# Patient Record
Sex: FEMALE | Race: WHITE | Employment: UNEMPLOYED | URBAN - METROPOLITAN AREA
[De-identification: names, ages, dates, MRNs, and addresses within clinical notes are randomized per-mention and may not be internally consistent; named-entity substitution may affect disease eponyms.]

---

## 2017-02-07 ENCOUNTER — HOSPITAL ENCOUNTER (INPATIENT)
Age: 47
LOS: 4 days | Discharge: HOME | DRG: 353 | End: 2017-02-13
Attending: EMERGENCY MEDICINE | Admitting: SURGERY
Payer: MEDICARE

## 2017-02-07 ENCOUNTER — APPOINTMENT (OUTPATIENT)
Dept: CT IMAGING | Age: 47
DRG: 353 | End: 2017-02-07
Attending: EMERGENCY MEDICINE
Payer: MEDICARE

## 2017-02-07 DIAGNOSIS — K56.609 SMALL BOWEL OBSTRUCTION (HCC): Primary | ICD-10-CM

## 2017-02-07 DIAGNOSIS — F11.20 NARCOTIC DEPENDENCE (HCC): Chronic | ICD-10-CM

## 2017-02-07 DIAGNOSIS — J96.01 ACUTE RESPIRATORY FAILURE WITH HYPOXIA (HCC): ICD-10-CM

## 2017-02-07 DIAGNOSIS — K43.0 INCARCERATED INCISIONAL HERNIA: ICD-10-CM

## 2017-02-07 DIAGNOSIS — K42.0 UMBILICAL HERNIA WITH OBSTRUCTION, WITHOUT GANGRENE: ICD-10-CM

## 2017-02-07 LAB
ALBUMIN SERPL BCP-MCNC: 3.3 G/DL (ref 3.5–5)
ALBUMIN/GLOB SERPL: 0.9 {RATIO} (ref 1.2–3.5)
ALP SERPL-CCNC: 101 U/L (ref 50–136)
ALT SERPL-CCNC: 17 U/L (ref 12–65)
ANION GAP BLD CALC-SCNC: 11 MMOL/L (ref 7–16)
AST SERPL W P-5'-P-CCNC: 13 U/L (ref 15–37)
BACTERIA URNS QL MICRO: ABNORMAL /HPF
BASOPHILS # BLD AUTO: 0 K/UL (ref 0–0.2)
BASOPHILS # BLD: 0 % (ref 0–2)
BILIRUB SERPL-MCNC: 0.1 MG/DL (ref 0.2–1.1)
BUN SERPL-MCNC: 16 MG/DL (ref 6–23)
CALCIUM SERPL-MCNC: 7.8 MG/DL (ref 8.3–10.4)
CASTS URNS QL MICRO: ABNORMAL /LPF
CHLORIDE SERPL-SCNC: 107 MMOL/L (ref 98–107)
CO2 SERPL-SCNC: 24 MMOL/L (ref 21–32)
CREAT SERPL-MCNC: 0.74 MG/DL (ref 0.6–1)
CRYSTALS URNS QL MICRO: 0 /LPF
DIFFERENTIAL METHOD BLD: ABNORMAL
EOSINOPHIL # BLD: 0.1 K/UL (ref 0–0.8)
EOSINOPHIL NFR BLD: 1 % (ref 0.5–7.8)
EPI CELLS #/AREA URNS HPF: ABNORMAL /HPF
ERYTHROCYTE [DISTWIDTH] IN BLOOD BY AUTOMATED COUNT: 15 % (ref 11.9–14.6)
GLOBULIN SER CALC-MCNC: 3.5 G/DL (ref 2.3–3.5)
GLUCOSE SERPL-MCNC: 121 MG/DL (ref 65–100)
HCG UR QL: NEGATIVE
HCT VFR BLD AUTO: 43.8 % (ref 35.8–46.3)
HGB BLD-MCNC: 14.5 G/DL (ref 11.7–15.4)
IMM GRANULOCYTES # BLD: 0 K/UL (ref 0–0.5)
IMM GRANULOCYTES NFR BLD AUTO: 0.3 % (ref 0–5)
LYMPHOCYTES # BLD AUTO: 14 % (ref 13–44)
LYMPHOCYTES # BLD: 2 K/UL (ref 0.5–4.6)
MCH RBC QN AUTO: 31.3 PG (ref 26.1–32.9)
MCHC RBC AUTO-ENTMCNC: 33.1 G/DL (ref 31.4–35)
MCV RBC AUTO: 94.4 FL (ref 79.6–97.8)
MONOCYTES # BLD: 0.7 K/UL (ref 0.1–1.3)
MONOCYTES NFR BLD AUTO: 5 % (ref 4–12)
MUCOUS THREADS URNS QL MICRO: ABNORMAL /LPF
NEUTS SEG # BLD: 11.3 K/UL (ref 1.7–8.2)
NEUTS SEG NFR BLD AUTO: 80 % (ref 43–78)
PLATELET # BLD AUTO: 286 K/UL (ref 150–450)
PMV BLD AUTO: 10 FL (ref 10.8–14.1)
POTASSIUM SERPL-SCNC: 3.4 MMOL/L (ref 3.5–5.1)
PROT SERPL-MCNC: 6.8 G/DL (ref 6.3–8.2)
RBC # BLD AUTO: 4.64 M/UL (ref 4.05–5.25)
RBC #/AREA URNS HPF: ABNORMAL /HPF
SODIUM SERPL-SCNC: 142 MMOL/L (ref 136–145)
WBC # BLD AUTO: 14.2 K/UL (ref 4.3–11.1)
WBC URNS QL MICRO: ABNORMAL /HPF

## 2017-02-07 PROCEDURE — 81025 URINE PREGNANCY TEST: CPT

## 2017-02-07 PROCEDURE — 81015 MICROSCOPIC EXAM OF URINE: CPT | Performed by: EMERGENCY MEDICINE

## 2017-02-07 PROCEDURE — 74011250636 HC RX REV CODE- 250/636: Performed by: EMERGENCY MEDICINE

## 2017-02-07 PROCEDURE — 96374 THER/PROPH/DIAG INJ IV PUSH: CPT | Performed by: EMERGENCY MEDICINE

## 2017-02-07 PROCEDURE — 85025 COMPLETE CBC W/AUTO DIFF WBC: CPT | Performed by: EMERGENCY MEDICINE

## 2017-02-07 PROCEDURE — 96361 HYDRATE IV INFUSION ADD-ON: CPT | Performed by: EMERGENCY MEDICINE

## 2017-02-07 PROCEDURE — 80053 COMPREHEN METABOLIC PANEL: CPT | Performed by: EMERGENCY MEDICINE

## 2017-02-07 PROCEDURE — 0JX80ZC TRANSFER ABDOMEN SUBCUTANEOUS TISSUE AND FASCIA WITH SKIN, SUBCUTANEOUS TISSUE AND FASCIA, OPEN APPROACH: ICD-10-PCS | Performed by: SURGERY

## 2017-02-07 PROCEDURE — 74176 CT ABD & PELVIS W/O CONTRAST: CPT

## 2017-02-07 PROCEDURE — 99218 HC RM OBSERVATION: CPT

## 2017-02-07 PROCEDURE — 81003 URINALYSIS AUTO W/O SCOPE: CPT | Performed by: EMERGENCY MEDICINE

## 2017-02-07 PROCEDURE — 96375 TX/PRO/DX INJ NEW DRUG ADDON: CPT | Performed by: EMERGENCY MEDICINE

## 2017-02-07 PROCEDURE — 99285 EMERGENCY DEPT VISIT HI MDM: CPT | Performed by: EMERGENCY MEDICINE

## 2017-02-07 PROCEDURE — 0WUF0JZ SUPPLEMENT ABDOMINAL WALL WITH SYNTHETIC SUBSTITUTE, OPEN APPROACH: ICD-10-PCS | Performed by: SURGERY

## 2017-02-07 RX ORDER — DIPHENHYDRAMINE HYDROCHLORIDE 50 MG/ML
12.5 INJECTION, SOLUTION INTRAMUSCULAR; INTRAVENOUS
Status: DISCONTINUED | OUTPATIENT
Start: 2017-02-07 | End: 2017-02-09

## 2017-02-07 RX ORDER — MORPHINE SULFATE 4 MG/ML
4 INJECTION, SOLUTION INTRAMUSCULAR; INTRAVENOUS
Status: COMPLETED | OUTPATIENT
Start: 2017-02-07 | End: 2017-02-07

## 2017-02-07 RX ORDER — HYDROMORPHONE HYDROCHLORIDE 1 MG/ML
.5-2 INJECTION, SOLUTION INTRAMUSCULAR; INTRAVENOUS; SUBCUTANEOUS
Status: DISCONTINUED | OUTPATIENT
Start: 2017-02-07 | End: 2017-02-07 | Stop reason: SDUPTHER

## 2017-02-07 RX ORDER — HYDROMORPHONE HYDROCHLORIDE 1 MG/ML
0.5 INJECTION, SOLUTION INTRAMUSCULAR; INTRAVENOUS; SUBCUTANEOUS
Status: DISCONTINUED | OUTPATIENT
Start: 2017-02-07 | End: 2017-02-09

## 2017-02-07 RX ORDER — ONDANSETRON 2 MG/ML
4 INJECTION INTRAMUSCULAR; INTRAVENOUS
Status: DISCONTINUED | OUTPATIENT
Start: 2017-02-07 | End: 2017-02-13 | Stop reason: HOSPADM

## 2017-02-07 RX ORDER — SODIUM CHLORIDE 0.9 % (FLUSH) 0.9 %
5-10 SYRINGE (ML) INJECTION AS NEEDED
Status: DISCONTINUED | OUTPATIENT
Start: 2017-02-07 | End: 2017-02-13 | Stop reason: HOSPADM

## 2017-02-07 RX ORDER — DEXTROSE, SODIUM CHLORIDE, SODIUM LACTATE, POTASSIUM CHLORIDE, AND CALCIUM CHLORIDE 5; .6; .31; .03; .02 G/100ML; G/100ML; G/100ML; G/100ML; G/100ML
125 INJECTION, SOLUTION INTRAVENOUS CONTINUOUS
Status: DISCONTINUED | OUTPATIENT
Start: 2017-02-07 | End: 2017-02-09

## 2017-02-07 RX ORDER — PHENYTOIN SODIUM 100 MG/1
300 CAPSULE, EXTENDED RELEASE ORAL 2 TIMES DAILY
Status: DISCONTINUED | OUTPATIENT
Start: 2017-02-08 | End: 2017-02-13 | Stop reason: HOSPADM

## 2017-02-07 RX ORDER — CLONAZEPAM 0.5 MG/1
1 TABLET ORAL EVERY 4 HOURS
Status: DISCONTINUED | OUTPATIENT
Start: 2017-02-08 | End: 2017-02-10

## 2017-02-07 RX ORDER — ENOXAPARIN SODIUM 100 MG/ML
40 INJECTION SUBCUTANEOUS EVERY 24 HOURS
Status: DISCONTINUED | OUTPATIENT
Start: 2017-02-07 | End: 2017-02-07 | Stop reason: SDUPTHER

## 2017-02-07 RX ORDER — ENOXAPARIN SODIUM 100 MG/ML
40 INJECTION SUBCUTANEOUS EVERY 24 HOURS
Status: DISCONTINUED | OUTPATIENT
Start: 2017-02-08 | End: 2017-02-13 | Stop reason: HOSPADM

## 2017-02-07 RX ORDER — HYDROMORPHONE HYDROCHLORIDE 1 MG/ML
1 INJECTION, SOLUTION INTRAMUSCULAR; INTRAVENOUS; SUBCUTANEOUS
Status: DISCONTINUED | OUTPATIENT
Start: 2017-02-07 | End: 2017-02-09

## 2017-02-07 RX ORDER — ONDANSETRON 2 MG/ML
4 INJECTION INTRAMUSCULAR; INTRAVENOUS
Status: COMPLETED | OUTPATIENT
Start: 2017-02-07 | End: 2017-02-07

## 2017-02-07 RX ORDER — TRAZODONE HYDROCHLORIDE 50 MG/1
100 TABLET ORAL
Status: DISCONTINUED | OUTPATIENT
Start: 2017-02-07 | End: 2017-02-13 | Stop reason: HOSPADM

## 2017-02-07 RX ORDER — SODIUM CHLORIDE 0.9 % (FLUSH) 0.9 %
5-10 SYRINGE (ML) INJECTION EVERY 8 HOURS
Status: DISCONTINUED | OUTPATIENT
Start: 2017-02-07 | End: 2017-02-13 | Stop reason: HOSPADM

## 2017-02-07 RX ORDER — SODIUM CHLORIDE 0.9 % (FLUSH) 0.9 %
5-10 SYRINGE (ML) INJECTION AS NEEDED
Status: DISCONTINUED | OUTPATIENT
Start: 2017-02-07 | End: 2017-02-07

## 2017-02-07 RX ORDER — SODIUM CHLORIDE 0.9 % (FLUSH) 0.9 %
5-10 SYRINGE (ML) INJECTION EVERY 8 HOURS
Status: DISCONTINUED | OUTPATIENT
Start: 2017-02-07 | End: 2017-02-07

## 2017-02-07 RX ORDER — AMLODIPINE BESYLATE 5 MG/1
5 TABLET ORAL DAILY
Status: DISCONTINUED | OUTPATIENT
Start: 2017-02-08 | End: 2017-02-13 | Stop reason: HOSPADM

## 2017-02-07 RX ORDER — HYDROMORPHONE HYDROCHLORIDE 1 MG/ML
2 INJECTION, SOLUTION INTRAMUSCULAR; INTRAVENOUS; SUBCUTANEOUS
Status: DISCONTINUED | OUTPATIENT
Start: 2017-02-07 | End: 2017-02-13

## 2017-02-07 RX ADMIN — SODIUM CHLORIDE 1000 ML: 900 INJECTION, SOLUTION INTRAVENOUS at 21:19

## 2017-02-07 RX ADMIN — ONDANSETRON 4 MG: 2 INJECTION INTRAMUSCULAR; INTRAVENOUS at 21:19

## 2017-02-07 RX ADMIN — MORPHINE SULFATE 4 MG: 4 INJECTION, SOLUTION INTRAMUSCULAR; INTRAVENOUS at 21:19

## 2017-02-07 NOTE — IP AVS SNAPSHOT
[address] 
[phone] Patient: Lydia Nava MRN: [id number] :[id number] You are allergic to the following Allergen Reactions Contrast Dye (Iodine) Anaphylaxis Pcn (Penicillins) Anaphylaxis Other Medication Other (comments) Contrast dye unknown name \"throat closes up\" Pcn (Penicillins) Itching Recent Documentation Height Weight Breastfeeding? BMI OB Status Smoking Status 1.727 m 90.7 kg No 30.41 kg/m2 Having regular periods Current Every Day Smoker Emergency Contacts Name Discharge Info Relation Home Work Mobile Ladonna Ng [17] [phone] About your hospitalization You were admitted on:  2017 [facility] on:  2017 Unit phone number:  [phone] Why you were hospitalized Your primary diagnosis was: Incarcerated Incisional Hernia Your diagnoses also included:  Depression, Seizure Disorder (Hcc), Copd (Chronic Obstructive Pulmonary Disease) (Hcc), Pain, Chronic, Bipolar Disorder (Hcc), Acute Respiratory Failure With Hypoxia (Hcc), Tobacco Use Disorder, Cap (Community Acquired Pneumonia) Providers Seen During Your Hospitalizations Provider Role Specialty Primary office phone Miley Tejada MD Attending Provider Emergency Medicine [phone] Willie Marino MD Attending Provider General Surgery [phone] Your Primary Care Physician (PCP) Primary Care Physician Office Phone Office Fax NOT ON FILE ** None ** ** None ** Follow-up Information Follow up With Details Comments Contact Info Not On File Bshsi In 1 week for Tobacco cessation meds/follow-up Not On File (58) Patient has a PCP but that physician is not listed in [facility]. Jacey Manjarrez MD On 2/21/2017 0845 am  Critical access hospital [address] [facility] [address] 
[phone] Your Appointments Tuesday February 21, 2017  8:45 AM EST Global Post Op with Jacey Manjarrez MD  
[place] [facility] ([address] [place] [address]) [address] [place] [id number] [phone] Current Discharge Medication List  
  
START taking these medications Dose & Instructions Dispensing Information Comments Morning Noon Evening Bedtime  
 albuterol-ipratropium 2.5 mg-0.5 mg/3 ml Nebu Commonly known as:  Micaela Stubbs Your next dose is: Today, Tomorrow Other:  _________ Dose:  3 mL  
3 mL by Nebulization route every four (4) hours as needed. Quantity:  180 Nebule Refills:  1  
     
   
   
   
  
 levoFLOXacin 750 mg tablet Commonly known as:  Threasa Mince Your next dose is: Today, Tomorrow Other:  _________ Dose:  750 mg Take 1 Tab by mouth daily for 3 days. Quantity:  3 Tab Refills:  0  
     
   
   
   
  
 nicotine 21 mg/24 hr  
Commonly known as:  Bernabe Monson Your next dose is: Today, Tomorrow Other:  _________ Dose:  1 Patch 1 Patch by TransDERmal route daily for 7 days. Quantity:  7 Patch Refills:  0  
     
   
   
   
  
 oxyCODONE-acetaminophen  mg per tablet Commonly known as:  PERCOCET 10 Your next dose is: Today, Tomorrow Other:  _________ Dose:  1 Tab Take 1 Tab by mouth every four (4) hours as needed. Max Daily Amount: 6 Tabs. Quantity:  30 Tab Refills:  0 CONTINUE these medications which have NOT CHANGED Dose & Instructions Dispensing Information Comments Morning Noon Evening Bedtime  
 amLODIPine 5 mg tablet Commonly known as:  Dewayne Nairer Your next dose is: Today, Tomorrow Other:  _________ Dose:  5 mg Take 5 mg by mouth daily. Refills:  0  
     
   
   
   
  
 baclofen 10 mg tablet Commonly known as:  LIORESAL Your next dose is: Today, Tomorrow Other:  _________ Dose:  10 mg Take 10 mg by mouth three (3) times daily. Refills:  0  
     
   
   
   
  
 carvedilol 12.5 mg tablet Commonly known as:  Phineas Hoar Your next dose is: Today, Tomorrow Other:  _________ Dose:  12.5 mg Take 12.5 mg by mouth daily. Refills:  0  
     
   
   
   
  
 DILANTIN EXTENDED 100 mg ER capsule Generic drug:  phenytoin ER Your next dose is: Today, Tomorrow Other:  _________ Dose:  300 mg Take 300 mg by mouth two (2) times a day. Refills:  0  
     
   
   
   
  
 divalproex  mg ER tablet Commonly known as:  DEPAKOTE ER Your next dose is: Today, Tomorrow Other:  _________ Dose:  250 mg Take 250 mg by mouth nightly. Refills:  0  
     
   
   
   
  
 escitalopram oxalate 10 mg tablet Commonly known as:  Skip  Your next dose is: Today, Tomorrow Other:  _________ Dose:  10 mg Take 10 mg by mouth daily. Refills:  0 KlonoPIN 1 mg tablet Generic drug:  clonazePAM  
   
Your next dose is: Today, Tomorrow Other:  _________ Take  by mouth every four (4) hours. Refills:  0  
     
   
   
   
  
 levothyroxine 50 mcg tablet Commonly known as:  SYNTHROID Your next dose is: Today, Tomorrow Other:  _________ Dose:  50 mcg Take 50 mcg by mouth Daily (before breakfast). Refills:  0  
     
   
   
   
  
 traZODone 100 mg tablet Commonly known as:  Alok Bump Your next dose is: Today, Tomorrow Other:  _________ Dose:  100 mg Take 100 mg by mouth nightly. Refills:  0  
     
   
   
   
  
 zonisamide 100 mg capsule Commonly known as:  Viviana Patel  
   
 Your next dose is: Today, Tomorrow Other:  _________ Dose:  100 mg Take 100 mg by mouth daily. Refills:  0 STOP taking these medications HYDROcodone-acetaminophen 5-325 mg per tablet Commonly known as:  Ren Byrne Where to Get Your Medications Information on where to get these meds will be given to you by the nurse or doctor. ! Ask your nurse or doctor about these medications  
  albuterol-ipratropium 2.5 mg-0.5 mg/3 ml Nebu  
 levoFLOXacin 750 mg tablet  
 nicotine 21 mg/24 hr  
 oxyCODONE-acetaminophen  mg per tablet Discharge Instructions MD Instructions: Follow-up with Dr. Saintclair Collard in 1 week. Keep incision/staples clean and dry, keep covered to prevent binder from rubbing. Do not apply lotions, creams or ointments to incisions/yonny. Mirian Gambler will be removed at follow-up appointment. Must wear abdominal binder at all times. AMINA management: Empty drain at least daily or if full. Record amount. Bring this information to your appointment. Diet - as tolerated - GI soft diet. Activity - ambulate - as tolerated - no heavy lifting >10lb. May shower - no tub baths or soaking. No driving while taking narcotics. Do not drink alcohol while taking narcotics. Resume other home medications. Complete antibiotics as directed. If problems or questions arise, please call our office at [phone]. DISCHARGE SUMMARY from Nurse The following personal items are in your possession at time of discharge: 
 
Dental Appliances: None Visual Aid: None Home Medications: None Jewelry: None Clothing: None Other Valuables: Cell Phone Personal Items Sent to Safe: none PATIENT INSTRUCTIONS: 
 
After general anesthesia or intravenous sedation, for 24 hours or while taking prescription Narcotics: · Limit your activities · Do not drive and operate hazardous machinery · Do not make important personal or business decisions · Do  not drink alcoholic beverages · If you have not urinated within 8 hours after discharge, please contact your surgeon on call. Report the following to your surgeon: 
· Excessive pain, swelling, redness or odor of or around the surgical area · Temperature over 100.5 · Nausea and vomiting lasting longer than 4 hours or if unable to take medications · Any signs of decreased circulation or nerve impairment to extremity: change in color, persistent  numbness, tingling, coldness or increase pain · Any questions What to do at Home: 
Recommended activity: Activity as tolerated, per MD instructions, No heavy lifting If you experience any of the following symptoms fever > 101, nausea, vomiting, abdominal pain, chest pain, shortness of breath please follow up with MD. 
 
 
*  Please give a list of your current medications to your Primary Care Provider. *  Please update this list whenever your medications are discontinued, doses are 
    changed, or new medications (including over-the-counter products) are added. *  Please carry medication information at all times in case of emergency situations. These are general instructions for a healthy lifestyle: No smoking/ No tobacco products/ Avoid exposure to second hand smoke Surgeon General's Warning:  Quitting smoking now greatly reduces serious risk to your health. Obesity, smoking, and sedentary lifestyle greatly increases your risk for illness A healthy diet, regular physical exercise & weight monitoring are important for maintaining a healthy lifestyle You may be retaining fluid if you have a history of heart failure or if you experience any of the following symptoms:  Weight gain of 3 pounds or more overnight or 5 pounds in a week, increased swelling in our hands or feet or shortness of breath while lying flat in bed. Please call your doctor as soon as you notice any of these symptoms; do not wait until your next office visit. Recognize signs and symptoms of STROKE: 
 
F-face looks uneven A-arms unable to move or move unevenly S-speech slurred or non-existent T-time-call 911 as soon as signs and symptoms begin-DO NOT go Back to bed or wait to see if you get better-TIME IS BRAIN. Warning Signs of HEART ATTACK Call 911 if you have these symptoms: 
? Chest discomfort. Most heart attacks involve discomfort in the center of the chest that lasts more than a few minutes, or that goes away and comes back. It can feel like uncomfortable pressure, squeezing, fullness, or pain. ? Discomfort in other areas of the upper body. Symptoms can include pain or discomfort in one or both arms, the back, neck, jaw, or stomach. ? Shortness of breath with or without chest discomfort. ? Other signs may include breaking out in a cold sweat, nausea, or lightheadedness. Don't wait more than five minutes to call [address]! Fast action can save your life. Calling 911 is almost always the fastest way to get lifesaving treatment. Emergency Medical Services staff can begin treatment when they arrive  up to an hour sooner than if someone gets to the hospital by car. The discharge information has been reviewed with the patient. The patient verbalized understanding. Discharge medications reviewed with the patient and appropriate educational materials and side effects teaching were provided. Hernia Repair: What to Expect at Home Your Recovery You are likely to have pain for the next few days. You may also feel like you have the flu, and you may have a low fever and feel tired and nauseated. This is common. You should feel better after a few days and will probably feel much better in 7 days. For several weeks you may feel twinges or pulling in the hernia repair when you move. You may have some bruising on the scrotum and along the penis. This is normal. Men will need to wear a jockstrap or briefs, not boxers, for scrotal support for several days after a groin (inguinal) hernia repair. [employer] bicycle shorts may provide good support. This care sheet gives you a general idea about how long it will take for you to recover. But each person recovers at a different pace. Follow the steps below to get better as quickly as possible. How can you care for yourself at home? Activity · Rest when you feel tired. Getting enough sleep will help you recover. · Try to walk each day. Start by walking a little more than you did the day before. Bit by bit, increase the amount you walk. Walking boosts blood flow and helps prevent pneumonia and constipation. · Avoid strenuous activities, such as biking, jogging, weight lifting, or aerobic exercise, until your doctor says it is okay. · Avoid lifting anything that would make you strain. This may include heavy grocery bags and milk containers, a heavy briefcase or backpack, cat litter or dog food bags, a vacuum , or a child. · You may drive when you are no longer taking pain medicine and can quickly move your foot from the gas pedal to the brake. You must also be able to sit comfortably for a long period of time, even if you do not plan to go far. You might get caught in traffic. · Most people are able to return to work within 1 to 2 weeks after surgery. · You may shower 24 to 48 hours after surgery, if your doctor okays it. Pat the cut (incision) dry. Do not take a bath for the first 2 weeks, or until your doctor tells you it is okay. · Your doctor will tell you when you can have sex again. Diet · You can eat your normal diet. If your stomach is upset, try bland, low-fat foods like plain rice, broiled chicken, toast, and yogurt. · Drink plenty of fluids (unless your doctor tells you not to). · You may notice that your bowel movements are not regular right after your surgery. This is common. Avoid constipation and straining with bowel movements. You may want to take a fiber supplement every day. If you have not had a bowel movement after a couple of days, ask your doctor about taking a mild laxative. Medicines · Your doctor will tell you if and when you can restart your medicines. He or she will also give you instructions about taking any new medicines. · If you take blood thinners, such as warfarin (Coumadin), clopidogrel (Plavix), or aspirin, be sure to talk to your doctor. He or she will tell you if and when to start taking those medicines again. Make sure that you understand exactly what your doctor wants you to do. · Be safe with medicines. Take pain medicines exactly as directed. ¨ If the doctor gave you a prescription medicine for pain, take it as prescribed. ¨ If you are not taking a prescription pain medicine, take an over-the-counter medicine such as acetaminophen (Tylenol), ibuprofen (Advil, Motrin), or naproxen (Aleve). Read and follow all instructions on the label. ¨ Do not take two or more pain medicines at the same time unless the doctor told you to. Many pain medicines have acetaminophen, which is Tylenol. Too much acetaminophen (Tylenol) can be harmful. · If your doctor prescribed antibiotics, take them as directed. Do not stop taking them just because you feel better. You need to take the full course of antibiotics. · If you think your pain medicine is making you sick to your stomach: 
¨ Take your medicine after meals (unless your doctor has told you not to). ¨ Ask your doctor for a different pain medicine. Incision care · If you have strips of tape on the cut (incision) the doctor made, leave the tape on for a week or until it falls off. · If you have staples closing the cut, you will need to visit your doctor in 1 to 2 weeks to have them removed. · Wash the area daily with warm, soapy water and pat it dry. Follow-up care is a key part of your treatment and safety. Be sure to make and go to all appointments, and call your doctor if you are having problems. It's also a good idea to know your test results and keep a list of the medicines you take. When should you call for help? Call 911 anytime you think you may need emergency care. For example, call if: 
· You passed out (lost consciousness). · You have sudden chest pain and shortness of breath, or you cough up blood. · You have severe pain in your belly. Call your doctor now or seek immediate medical care if: 
· You are sick to your stomach and cannot keep fluids down. · You have signs of a blood clot, such as: 
¨ Pain in your calf, back of the knee, thigh, or groin. ¨ Redness and swelling in your leg or groin. · You have trouble passing urine or stool, especially if you have mild pain or swelling in your lower belly. · Bright red blood has soaked through the bandage over your incision. Watch closely for any changes in your health, and be sure to contact your doctor if: 
· Your swelling is getting worse. · Your swelling is not going down. Where can you learn more? Go to http://ang-claribel.info/. Enter V882 in the search box to learn more about \"Hernia Repair: What to Expect at Home. \" Current as of: August 9, 2016 Content Version: 11.1 © 6980-4177 [employer], [employer]. Care instructions adapted under license by [employer] (which disclaims liability or warranty for this information). If you have questions about a medical condition or this instruction, always ask your healthcare professional. [address] any warranty or liability for your use of this information. Discharge Orders None [employer] Announcement We are excited to announce that we are making your provider's discharge notes available to you in [employer]. You will see these notes when they are completed and signed by the physician that discharged you from your recent hospital stay. If you have any questions or concerns about any information you see in [employer], please call the Health Information Department where you were seen or reach out to your Primary Care Provider for more information about your plan of care. Introducing \[facility]\"" & HEALTH SERVICES! [address] introduces [employer] patient portal. Now you can access parts of your medical record, email your doctor's office, and request medication refills online. 1. In your internet browser, go to [email or web address] 2. Click on the First Time User? Click Here link in the Sign In box. You will see the New Member Sign Up page. 3. Enter your [employer] Access Code exactly as it appears below. You will not need to use this code after youve completed the sign-up process. If you do not sign up before the expiration date, you must request a new code. · [employer] Access Code: [email or web address] Expires: 2/24/2017  2:02 PM 
 
4. Enter the last four digits of your Social Security Number (xxxx) and Date of Birth (mm/dd/yyyy) as indicated and click Submit. You will be taken to the next sign-up page. 5. Create a [employer] ID. This will be your [employer] login ID and cannot be changed, so think of one that is secure and easy to remember. 6. Create a [employer] password. You can change your password at any time. 7. Enter your Password Reset Question and Answer. This can be used at a later time if you forget your password. 8. Enter your e-mail address. You will receive e-mail notification when new information is available in [address] 9. Click Sign Up. You can now view and download portions of your medical record. 10. Click the Download Summary menu link to download a portable copy of your medical information. If you have questions, please visit the Frequently Asked Questions section of the [employer] website. Remember, [employer] is NOT to be used for urgent needs. For medical emergencies, dial 911. Now available from your iPhone and Android! General Information Please provide this summary of care documentation to your next provider. Patient Signature:  ____________________________________________________________ Date:  ____________________________________________________________  
  
Jayde Sleight Provider Signature:  ____________________________________________________________ Date:  ____________________________________________________________

## 2017-02-08 ENCOUNTER — ANESTHESIA (OUTPATIENT)
Dept: SURGERY | Age: 47
DRG: 353 | End: 2017-02-08
Payer: MEDICARE

## 2017-02-08 ENCOUNTER — ANESTHESIA EVENT (OUTPATIENT)
Dept: SURGERY | Age: 47
DRG: 353 | End: 2017-02-08
Payer: MEDICARE

## 2017-02-08 ENCOUNTER — SURGERY (OUTPATIENT)
Age: 47
End: 2017-02-08

## 2017-02-08 PROBLEM — K43.0 INCARCERATED INCISIONAL HERNIA: Status: ACTIVE | Noted: 2017-02-08

## 2017-02-08 PROCEDURE — 94640 AIRWAY INHALATION TREATMENT: CPT

## 2017-02-08 PROCEDURE — 74011000258 HC RX REV CODE- 258: Performed by: SURGERY

## 2017-02-08 PROCEDURE — 74011250636 HC RX REV CODE- 250/636: Performed by: ANESTHESIOLOGY

## 2017-02-08 PROCEDURE — 74011250637 HC RX REV CODE- 250/637: Performed by: ANESTHESIOLOGY

## 2017-02-08 PROCEDURE — 74011000250 HC RX REV CODE- 250

## 2017-02-08 PROCEDURE — 99218 HC RM OBSERVATION: CPT

## 2017-02-08 PROCEDURE — 74011000250 HC RX REV CODE- 250: Performed by: SURGERY

## 2017-02-08 PROCEDURE — 74011258636 HC RX REV CODE- 258/636: Performed by: SURGERY

## 2017-02-08 PROCEDURE — 77030008477 HC STYL SATN SLP COVD -A: Performed by: ANESTHESIOLOGY

## 2017-02-08 PROCEDURE — 74011250636 HC RX REV CODE- 250/636

## 2017-02-08 PROCEDURE — 77030031139 HC SUT VCRL2 J&J -A: Performed by: SURGERY

## 2017-02-08 PROCEDURE — 74011000250 HC RX REV CODE- 250: Performed by: ANESTHESIOLOGY

## 2017-02-08 PROCEDURE — 77030020782 HC GWN BAIR PAWS FLX 3M -B: Performed by: ANESTHESIOLOGY

## 2017-02-08 PROCEDURE — 77030032490 HC SLV COMPR SCD KNE COVD -B: Performed by: SURGERY

## 2017-02-08 PROCEDURE — 74011250636 HC RX REV CODE- 250/636: Performed by: SURGERY

## 2017-02-08 PROCEDURE — 77030012407 HC DRN WND BARD -B: Performed by: SURGERY

## 2017-02-08 PROCEDURE — 77030008467 HC STPLR SKN COVD -B: Performed by: SURGERY

## 2017-02-08 PROCEDURE — 77030002986 HC SUT PROL J&J -A: Performed by: SURGERY

## 2017-02-08 PROCEDURE — 77030005326 HC CATH PAIN PMP/Q AVNM -C: Performed by: SURGERY

## 2017-02-08 PROCEDURE — 77030002916 HC SUT ETHLN J&J -A: Performed by: SURGERY

## 2017-02-08 PROCEDURE — 77030019908 HC STETH ESOPH SIMS -A: Performed by: ANESTHESIOLOGY

## 2017-02-08 PROCEDURE — 77030018836 HC SOL IRR NACL ICUM -A: Performed by: SURGERY

## 2017-02-08 PROCEDURE — 77030011640 HC PAD GRND REM COVD -A: Performed by: SURGERY

## 2017-02-08 PROCEDURE — 74011250637 HC RX REV CODE- 250/637

## 2017-02-08 PROCEDURE — 77030008703 HC TU ET UNCUF COVD -A: Performed by: ANESTHESIOLOGY

## 2017-02-08 PROCEDURE — 74011250637 HC RX REV CODE- 250/637: Performed by: SURGERY

## 2017-02-08 PROCEDURE — 76060000036 HC ANESTHESIA 2.5 TO 3 HR: Performed by: SURGERY

## 2017-02-08 PROCEDURE — 76010000171 HC OR TIME 2 TO 2.5 HR INTENSV-TIER 1: Performed by: SURGERY

## 2017-02-08 PROCEDURE — C1781 MESH (IMPLANTABLE): HCPCS | Performed by: SURGERY

## 2017-02-08 PROCEDURE — 77030013567 HC DRN WND RESERV BARD -A: Performed by: SURGERY

## 2017-02-08 PROCEDURE — 76210000000 HC OR PH I REC 2 TO 2.5 HR: Performed by: SURGERY

## 2017-02-08 DEVICE — MESH HERN W3XL6IN INGUINAL POLYPR MFIL RECTANG: Type: IMPLANTABLE DEVICE | Site: ABDOMEN | Status: FUNCTIONAL

## 2017-02-08 RX ORDER — TRAZODONE HYDROCHLORIDE 100 MG/1
100 TABLET ORAL
COMMUNITY

## 2017-02-08 RX ORDER — BUPIVACAINE HYDROCHLORIDE 2.5 MG/ML
INJECTION, SOLUTION EPIDURAL; INFILTRATION; INTRACAUDAL AS NEEDED
Status: DISCONTINUED | OUTPATIENT
Start: 2017-02-08 | End: 2017-02-08 | Stop reason: HOSPADM

## 2017-02-08 RX ORDER — LEVOTHYROXINE SODIUM 50 UG/1
50 TABLET ORAL
Status: DISCONTINUED | OUTPATIENT
Start: 2017-02-09 | End: 2017-02-13 | Stop reason: HOSPADM

## 2017-02-08 RX ORDER — ZONISAMIDE 100 MG/1
100 CAPSULE ORAL DAILY
Status: DISCONTINUED | OUTPATIENT
Start: 2017-02-09 | End: 2017-02-13 | Stop reason: HOSPADM

## 2017-02-08 RX ORDER — CARVEDILOL 12.5 MG/1
12.5 TABLET ORAL 2 TIMES DAILY
Status: DISCONTINUED | OUTPATIENT
Start: 2017-02-09 | End: 2017-02-13 | Stop reason: HOSPADM

## 2017-02-08 RX ORDER — ESCITALOPRAM OXALATE 10 MG/1
10 TABLET ORAL DAILY
Status: DISCONTINUED | OUTPATIENT
Start: 2017-02-09 | End: 2017-02-13 | Stop reason: HOSPADM

## 2017-02-08 RX ORDER — DIVALPROEX SODIUM 250 MG/1
250 TABLET, EXTENDED RELEASE ORAL 2 TIMES DAILY
COMMUNITY

## 2017-02-08 RX ORDER — IBUPROFEN 200 MG
1 TABLET ORAL DAILY
Status: DISCONTINUED | OUTPATIENT
Start: 2017-02-08 | End: 2017-02-13 | Stop reason: HOSPADM

## 2017-02-08 RX ORDER — SUCCINYLCHOLINE CHLORIDE 20 MG/ML
INJECTION INTRAMUSCULAR; INTRAVENOUS AS NEEDED
Status: DISCONTINUED | OUTPATIENT
Start: 2017-02-08 | End: 2017-02-08 | Stop reason: HOSPADM

## 2017-02-08 RX ORDER — TRAZODONE HYDROCHLORIDE 50 MG/1
100 TABLET ORAL
Status: DISCONTINUED | OUTPATIENT
Start: 2017-02-08 | End: 2017-02-08 | Stop reason: SDUPTHER

## 2017-02-08 RX ORDER — OXYCODONE HYDROCHLORIDE 5 MG/1
5 TABLET ORAL
Status: DISCONTINUED | OUTPATIENT
Start: 2017-02-08 | End: 2017-02-08 | Stop reason: HOSPADM

## 2017-02-08 RX ORDER — ROCURONIUM BROMIDE 10 MG/ML
INJECTION, SOLUTION INTRAVENOUS AS NEEDED
Status: DISCONTINUED | OUTPATIENT
Start: 2017-02-08 | End: 2017-02-08 | Stop reason: HOSPADM

## 2017-02-08 RX ORDER — GLYCOPYRROLATE 0.2 MG/ML
INJECTION INTRAMUSCULAR; INTRAVENOUS AS NEEDED
Status: DISCONTINUED | OUTPATIENT
Start: 2017-02-08 | End: 2017-02-08 | Stop reason: HOSPADM

## 2017-02-08 RX ORDER — CARVEDILOL 12.5 MG/1
12.5 TABLET ORAL
Status: COMPLETED | OUTPATIENT
Start: 2017-02-08 | End: 2017-02-08

## 2017-02-08 RX ORDER — DIVALPROEX SODIUM 500 MG/1
500 TABLET, DELAYED RELEASE ORAL
Status: DISCONTINUED | OUTPATIENT
Start: 2017-02-08 | End: 2017-02-13 | Stop reason: HOSPADM

## 2017-02-08 RX ORDER — NEOSTIGMINE METHYLSULFATE 1 MG/ML
INJECTION INTRAVENOUS AS NEEDED
Status: DISCONTINUED | OUTPATIENT
Start: 2017-02-08 | End: 2017-02-08 | Stop reason: HOSPADM

## 2017-02-08 RX ORDER — ESCITALOPRAM OXALATE 10 MG/1
10 TABLET ORAL DAILY
COMMUNITY
End: 2021-02-25 | Stop reason: CLARIF

## 2017-02-08 RX ORDER — CARVEDILOL 12.5 MG/1
12.5 TABLET ORAL DAILY
Status: DISCONTINUED | OUTPATIENT
Start: 2017-02-09 | End: 2017-02-08

## 2017-02-08 RX ORDER — LEVALBUTEROL INHALATION SOLUTION 1.25 MG/3ML
1.25 SOLUTION RESPIRATORY (INHALATION)
Status: COMPLETED | OUTPATIENT
Start: 2017-02-08 | End: 2017-02-08

## 2017-02-08 RX ORDER — OXYCODONE AND ACETAMINOPHEN 5; 325 MG/1; MG/1
1 TABLET ORAL AS NEEDED
Status: DISCONTINUED | OUTPATIENT
Start: 2017-02-08 | End: 2017-02-08 | Stop reason: HOSPADM

## 2017-02-08 RX ORDER — HYDROMORPHONE HYDROCHLORIDE 2 MG/ML
0.5 INJECTION, SOLUTION INTRAMUSCULAR; INTRAVENOUS; SUBCUTANEOUS
Status: DISCONTINUED | OUTPATIENT
Start: 2017-02-08 | End: 2017-02-08 | Stop reason: HOSPADM

## 2017-02-08 RX ORDER — SODIUM CHLORIDE, SODIUM LACTATE, POTASSIUM CHLORIDE, CALCIUM CHLORIDE 600; 310; 30; 20 MG/100ML; MG/100ML; MG/100ML; MG/100ML
100 INJECTION, SOLUTION INTRAVENOUS CONTINUOUS
Status: DISCONTINUED | OUTPATIENT
Start: 2017-02-08 | End: 2017-02-08 | Stop reason: HOSPADM

## 2017-02-08 RX ORDER — LIDOCAINE HYDROCHLORIDE 20 MG/ML
INJECTION, SOLUTION EPIDURAL; INFILTRATION; INTRACAUDAL; PERINEURAL AS NEEDED
Status: DISCONTINUED | OUTPATIENT
Start: 2017-02-08 | End: 2017-02-08 | Stop reason: HOSPADM

## 2017-02-08 RX ORDER — BACLOFEN 10 MG/1
10 TABLET ORAL 3 TIMES DAILY
COMMUNITY
End: 2021-02-25 | Stop reason: CLARIF

## 2017-02-08 RX ORDER — SODIUM CHLORIDE, SODIUM LACTATE, POTASSIUM CHLORIDE, CALCIUM CHLORIDE 600; 310; 30; 20 MG/100ML; MG/100ML; MG/100ML; MG/100ML
INJECTION, SOLUTION INTRAVENOUS
Status: DISCONTINUED | OUTPATIENT
Start: 2017-02-08 | End: 2017-02-08 | Stop reason: HOSPADM

## 2017-02-08 RX ORDER — FENTANYL CITRATE 50 UG/ML
INJECTION, SOLUTION INTRAMUSCULAR; INTRAVENOUS AS NEEDED
Status: DISCONTINUED | OUTPATIENT
Start: 2017-02-08 | End: 2017-02-08 | Stop reason: HOSPADM

## 2017-02-08 RX ORDER — CARVEDILOL 12.5 MG/1
12.5 TABLET ORAL EVERY EVENING
COMMUNITY

## 2017-02-08 RX ORDER — ALBUTEROL SULFATE 90 UG/1
AEROSOL, METERED RESPIRATORY (INHALATION) AS NEEDED
Status: DISCONTINUED | OUTPATIENT
Start: 2017-02-08 | End: 2017-02-08 | Stop reason: HOSPADM

## 2017-02-08 RX ORDER — DEXAMETHASONE SODIUM PHOSPHATE 4 MG/ML
INJECTION, SOLUTION INTRA-ARTICULAR; INTRALESIONAL; INTRAMUSCULAR; INTRAVENOUS; SOFT TISSUE AS NEEDED
Status: DISCONTINUED | OUTPATIENT
Start: 2017-02-08 | End: 2017-02-08 | Stop reason: HOSPADM

## 2017-02-08 RX ORDER — BACLOFEN 10 MG/1
10 TABLET ORAL 3 TIMES DAILY
Status: DISCONTINUED | OUTPATIENT
Start: 2017-02-08 | End: 2017-02-13 | Stop reason: HOSPADM

## 2017-02-08 RX ORDER — PROPOFOL 10 MG/ML
INJECTION, EMULSION INTRAVENOUS AS NEEDED
Status: DISCONTINUED | OUTPATIENT
Start: 2017-02-08 | End: 2017-02-08 | Stop reason: HOSPADM

## 2017-02-08 RX ORDER — SODIUM CHLORIDE 0.9 % (FLUSH) 0.9 %
5-10 SYRINGE (ML) INJECTION AS NEEDED
Status: DISCONTINUED | OUTPATIENT
Start: 2017-02-08 | End: 2017-02-08 | Stop reason: HOSPADM

## 2017-02-08 RX ORDER — ONDANSETRON 2 MG/ML
INJECTION INTRAMUSCULAR; INTRAVENOUS AS NEEDED
Status: DISCONTINUED | OUTPATIENT
Start: 2017-02-08 | End: 2017-02-08 | Stop reason: HOSPADM

## 2017-02-08 RX ORDER — ZONISAMIDE 100 MG/1
100 CAPSULE ORAL DAILY
COMMUNITY
End: 2021-02-25 | Stop reason: CLARIF

## 2017-02-08 RX ORDER — HYDROMORPHONE HYDROCHLORIDE 1 MG/ML
0.5 INJECTION, SOLUTION INTRAMUSCULAR; INTRAVENOUS; SUBCUTANEOUS
Status: COMPLETED | OUTPATIENT
Start: 2017-02-08 | End: 2017-02-08

## 2017-02-08 RX ORDER — LEVOTHYROXINE SODIUM 50 UG/1
50 TABLET ORAL
COMMUNITY
End: 2021-02-25 | Stop reason: CLARIF

## 2017-02-08 RX ADMIN — DIVALPROEX SODIUM 500 MG: 500 TABLET, DELAYED RELEASE ORAL at 22:41

## 2017-02-08 RX ADMIN — CLONAZEPAM 1 MG: 0.5 TABLET ORAL at 03:04

## 2017-02-08 RX ADMIN — ONDANSETRON 4 MG: 2 INJECTION INTRAMUSCULAR; INTRAVENOUS at 08:33

## 2017-02-08 RX ADMIN — NEOSTIGMINE METHYLSULFATE 3 MG: 1 INJECTION INTRAVENOUS at 16:37

## 2017-02-08 RX ADMIN — ROCURONIUM BROMIDE 10 MG: 10 INJECTION, SOLUTION INTRAVENOUS at 15:30

## 2017-02-08 RX ADMIN — ENOXAPARIN SODIUM 40 MG: 40 INJECTION SUBCUTANEOUS at 07:43

## 2017-02-08 RX ADMIN — FENTANYL CITRATE 50 MCG: 50 INJECTION, SOLUTION INTRAMUSCULAR; INTRAVENOUS at 14:38

## 2017-02-08 RX ADMIN — PROPOFOL 200 MG: 10 INJECTION, EMULSION INTRAVENOUS at 14:38

## 2017-02-08 RX ADMIN — Medication: at 23:21

## 2017-02-08 RX ADMIN — ROCURONIUM BROMIDE 10 MG: 10 INJECTION, SOLUTION INTRAVENOUS at 15:53

## 2017-02-08 RX ADMIN — HYDROMORPHONE HYDROCHLORIDE 0.5 MG: 2 INJECTION, SOLUTION INTRAMUSCULAR; INTRAVENOUS; SUBCUTANEOUS at 18:04

## 2017-02-08 RX ADMIN — SUCCINYLCHOLINE CHLORIDE 200 MG: 20 INJECTION INTRAMUSCULAR; INTRAVENOUS at 14:38

## 2017-02-08 RX ADMIN — TRAZODONE HYDROCHLORIDE 100 MG: 50 TABLET ORAL at 21:01

## 2017-02-08 RX ADMIN — SODIUM CHLORIDE, SODIUM LACTATE, POTASSIUM CHLORIDE, CALCIUM CHLORIDE, AND DEXTROSE MONOHYDRATE 125 ML/HR: 600; 310; 30; 20; 5 INJECTION, SOLUTION INTRAVENOUS at 06:06

## 2017-02-08 RX ADMIN — ROCURONIUM BROMIDE 5 MG: 10 INJECTION, SOLUTION INTRAVENOUS at 14:38

## 2017-02-08 RX ADMIN — SODIUM CHLORIDE 900 MG: 900 INJECTION, SOLUTION INTRAVENOUS at 14:38

## 2017-02-08 RX ADMIN — GLYCOPYRROLATE 0.4 MG: 0.2 INJECTION INTRAMUSCULAR; INTRAVENOUS at 16:37

## 2017-02-08 RX ADMIN — PHENYTOIN SODIUM 300 MG: 100 CAPSULE ORAL at 07:46

## 2017-02-08 RX ADMIN — LIDOCAINE HYDROCHLORIDE 100 MG: 20 INJECTION, SOLUTION EPIDURAL; INFILTRATION; INTRACAUDAL; PERINEURAL at 14:38

## 2017-02-08 RX ADMIN — DEXAMETHASONE SODIUM PHOSPHATE 8 MG: 4 INJECTION, SOLUTION INTRA-ARTICULAR; INTRALESIONAL; INTRAMUSCULAR; INTRAVENOUS; SOFT TISSUE at 14:44

## 2017-02-08 RX ADMIN — ONDANSETRON 4 MG: 2 INJECTION INTRAMUSCULAR; INTRAVENOUS at 15:35

## 2017-02-08 RX ADMIN — PHENYTOIN SODIUM 300 MG: 100 CAPSULE ORAL at 21:00

## 2017-02-08 RX ADMIN — HYDROMORPHONE HYDROCHLORIDE 0.5 MG: 1 INJECTION, SOLUTION INTRAMUSCULAR; INTRAVENOUS; SUBCUTANEOUS at 12:35

## 2017-02-08 RX ADMIN — CARVEDILOL 12.5 MG: 12.5 TABLET, FILM COATED ORAL at 12:34

## 2017-02-08 RX ADMIN — SODIUM CHLORIDE, SODIUM LACTATE, POTASSIUM CHLORIDE, CALCIUM CHLORIDE, AND DEXTROSE MONOHYDRATE 125 ML/HR: 600; 310; 30; 20; 5 INJECTION, SOLUTION INTRAVENOUS at 00:19

## 2017-02-08 RX ADMIN — ALBUTEROL SULFATE 6 PUFF: 90 AEROSOL, METERED RESPIRATORY (INHALATION) at 16:48

## 2017-02-08 RX ADMIN — SODIUM CHLORIDE, SODIUM LACTATE, POTASSIUM CHLORIDE, CALCIUM CHLORIDE: 600; 310; 30; 20 INJECTION, SOLUTION INTRAVENOUS at 15:24

## 2017-02-08 RX ADMIN — Medication: at 03:22

## 2017-02-08 RX ADMIN — SODIUM CHLORIDE, SODIUM LACTATE, POTASSIUM CHLORIDE, CALCIUM CHLORIDE: 600; 310; 30; 20 INJECTION, SOLUTION INTRAVENOUS at 14:27

## 2017-02-08 RX ADMIN — LEVALBUTEROL HYDROCHLORIDE 1.25 MG: 1.25 SOLUTION RESPIRATORY (INHALATION) at 18:59

## 2017-02-08 RX ADMIN — HYDROMORPHONE HYDROCHLORIDE 1 MG: 1 INJECTION, SOLUTION INTRAMUSCULAR; INTRAVENOUS; SUBCUTANEOUS at 06:37

## 2017-02-08 RX ADMIN — TRAZODONE HYDROCHLORIDE 100 MG: 50 TABLET ORAL at 03:04

## 2017-02-08 RX ADMIN — ROCURONIUM BROMIDE 20 MG: 10 INJECTION, SOLUTION INTRAVENOUS at 14:44

## 2017-02-08 RX ADMIN — HYDROMORPHONE HYDROCHLORIDE 1 MG: 1 INJECTION, SOLUTION INTRAMUSCULAR; INTRAVENOUS; SUBCUTANEOUS at 00:14

## 2017-02-08 RX ADMIN — CLONAZEPAM 1 MG: 0.5 TABLET ORAL at 07:45

## 2017-02-08 RX ADMIN — ROCURONIUM BROMIDE 10 MG: 10 INJECTION, SOLUTION INTRAVENOUS at 15:19

## 2017-02-08 RX ADMIN — HYDROMORPHONE HYDROCHLORIDE 1 MG: 1 INJECTION, SOLUTION INTRAMUSCULAR; INTRAVENOUS; SUBCUTANEOUS at 22:49

## 2017-02-08 RX ADMIN — CLONAZEPAM 1 MG: 0.5 TABLET ORAL at 21:00

## 2017-02-08 RX ADMIN — BACLOFEN 10 MG: 10 TABLET ORAL at 22:41

## 2017-02-08 RX ADMIN — Medication 10 ML: at 22:43

## 2017-02-08 RX ADMIN — ONDANSETRON 4 MG: 2 INJECTION INTRAMUSCULAR; INTRAVENOUS at 12:34

## 2017-02-08 RX ADMIN — ROCURONIUM BROMIDE 10 MG: 10 INJECTION, SOLUTION INTRAVENOUS at 14:57

## 2017-02-08 RX ADMIN — BUPIVACAINE HYDROCHLORIDE 11 ML: 2.5 INJECTION, SOLUTION EPIDURAL; INFILTRATION; INTRACAUDAL; PERINEURAL at 17:08

## 2017-02-08 RX ADMIN — ROCURONIUM BROMIDE 15 MG: 10 INJECTION, SOLUTION INTRAVENOUS at 15:00

## 2017-02-08 RX ADMIN — CLINDAMYCIN PHOSPHATE 900 MG: 150 INJECTION, SOLUTION, CONCENTRATE INTRAVENOUS at 20:56

## 2017-02-08 RX ADMIN — Medication 10 ML: at 03:04

## 2017-02-08 RX ADMIN — FENTANYL CITRATE 50 MCG: 50 INJECTION, SOLUTION INTRAMUSCULAR; INTRAVENOUS at 14:58

## 2017-02-08 NOTE — BRIEF OP NOTE
BRIEF OPERATIVE NOTE    Date of Procedure: 2/8/2017   Preoperative Diagnosis: INCARCERATED UMBILICAL HERNIA  Postoperative Diagnosis: bila    Procedure(s):  REPAIR WITH MESH INCARCERATED INCISIONAL HERNIA/ BILATERAL MYOFASCIAL ADVANCEMENT FLAPS OF ABDOMINAL WALLL/INSERTION OF ON Q PAINBUSTER  Surgeon(s) and Role:     * Dash Valdovinos MD - Primary            Surgical Staff:  Circ-1: Vania Aldana RN  Circ-Relief: Og Crawford RN  Scrub Tech-1: Any Hoffmann  Event Time In   Incision Start 1459   Incision Close 1644     Anesthesia: General   Estimated Blood Loss: 50-100cc  Specimens: * No specimens in log *   Findings: sseveral small defects of supraumbilical midline fascia with adherent small bowel to main, periumbilical defect with minimal length of small bowel protruding. Dense scar along other segments of midline with poor quality of fascia.   Complications: none  Implants: * No implants in log *

## 2017-02-08 NOTE — H&P
History and Physical    2017    Patient: Reji Corrigan 51 y.o. female     Referring Physician:  No ref. provider found    Chief Complaint: hernia    History of Present Illness: 52 yo female with history significant for hysterectomy and colon repair 9361 as a complication from [facility]  2 months prior who presented to ER last night with worsening of ongoing upper abdominal pain. Yesterday, this was accompanied by nausea and vomiting. She reports having an intermittently painful mass at the upper aspect of the midline incision, as well as pain along the incision, for quite some time but this seemed to worsen recently. She has not noted fever, chills, hematemesis, melena, hematochezia. By her report, the colonic injury from [facility]  was identified 2 months later and was primarily repaired at the time of hysterectomy- she did not have colectomy. History:    Past Medical History   Diagnosis Date    Acute renal failure ([address] 2010    COPD (chronic obstructive pulmonary disease) ([facility] 2010    Endocrine disease      hypothyroid    Hypertension     Other ill-defined conditions(799.89)      chronic back pain, migraines    Psychiatric disorder      anxiety    Seizure disorder ([address] 2010    Seizures ([address]     Thrush 10/23/2010     Past Surgical History   Procedure Laterality Date    Hx appendectomy      Hx cholecystectomy       Family History   Problem Relation Age of Onset    Heart Disease Father     Heart Attack Father       age 46   [address] [facility] Se[facility] Mother      Social History     Social History    Marital status:      Spouse name: N/A    Number of children: N/A    Years of education: N/A     Occupational History    Not on file.      Social History Main Topics    Smoking status: Current Every Day Smoker     Packs/day: 1.50     Years: 30.00     Types: Cigarettes    Smokeless tobacco: Never Used    Alcohol use No    Drug use: No      Comment: narcotics    Sexual activity: Not Currently     Other Topics Concern    Not on file     Social History Narrative    ** Merged History Encounter **         ** Data from: 10/20/10 Enc Dept: [facility] EMERGENCY DEPT         ** Data from: 9/16/10 Enc Dept:  [address] with her boyfriend Kp Morales and her son       Allergies: Allergies   Allergen Reactions    Contrast Dye [Iodine] Anaphylaxis    Pcn [Penicillins] Anaphylaxis    Other Medication Other (comments)     Contrast dye unknown name \"throat closes up\"     Pcn [Penicillins] Itching       Review of Systems:  A comprehensive review of systems was negative except for: Musculoskeletal: positive for back pain  Neurological: positive for seizures and this is controlled  Behvioral/Psych: positive for depression    Prior to Admission Medications:    Prior to Admission Medications   Prescriptions Last Dose Informant Patient Reported? Taking?   amlodipine (NORVASC) 5 mg tablet 2/7/2017 at Unknown time  Yes Yes   Sig: Take 5 mg by mouth daily. baclofen (LIORESAL) 10 mg tablet 2/7/2017 at Unknown time  Yes Yes   Sig: Take 10 mg by mouth three (3) times daily. carvedilol (COREG) 12.5 mg tablet 2/7/2017  Yes Yes   Sig: Take 12.5 mg by mouth daily. clonazepam (KLONOPIN) 1 mg tablet 2/7/2017  Yes No   Sig: Take  by mouth every four (4) hours. divalproex ER (DEPAKOTE ER) 250 mg ER tablet 2/7/2017  Yes Yes   Sig: Take 250 mg by mouth nightly. escitalopram oxalate (LEXAPRO) 10 mg tablet 2/7/2017 at Unknown time  Yes Yes   Sig: Take 10 mg by mouth daily. levothyroxine (SYNTHROID) 50 mcg tablet 2/7/2017 at Unknown time  Yes Yes   Sig: Take 50 mcg by mouth Daily (before breakfast). phenytoin ER (DILANTIN EXTENDED) 100 mg ER capsule 2/7/2017  Yes No   Sig: Take 300 mg by mouth two (2) times a day. traZODone (DESYREL) 100 mg tablet Not Taking at Unknown time  Yes No   Sig: Take 100 mg by mouth nightly.    zonisamide (ZONEGRAN) 100 mg capsule 2/7/2017  Yes Yes   Sig: Take 100 mg by mouth daily. Facility-Administered Medications: None        Physical Exam:    Blood pressure 96/66, pulse 68, temperature 98.4 °F (36.9 °C), resp. rate 18, height 5' 8\" (1.727 m), weight 200 lb (90.7 kg), SpO2 94 %, not currently breastfeeding. Physical Exam:  GENERAL: alert, cooperative, no distress, appears stated age, EYE: negative findings: anicteric sclera, THROAT & NECK: normal and no erythema or exudates noted. , LUNG: clear to auscultation bilaterally, diminished breath sounds diffusely, HEART: regular rate and rhythm, S1, S2 normal, no murmur, click, rub or gallop, ABDOMEN: obese. Normal bowel sounds. Healed midline scar. Tenderness along supraumbilical aspect of incision, possible fascial defect just above umbilicus. Incarcerated content not appreciated on exam, EXTREMITIES:  extremities normal, atraumatic, no cyanosis or edema, SKIN: Normal., PSYCHIATRIC: non focal    Labs:   Recent Results (from the past 24 hour(s))   CBC WITH AUTOMATED DIFF    Collection Time: 02/07/17  8:18 PM   Result Value Ref Range    WBC 14.2 (H) 4.3 - 11.1 K/uL    RBC 4.64 4.05 - 5.25 M/uL    HGB 14.5 11.7 - 15.4 g/dL    HCT 43.8 35.8 - 46.3 %    MCV 94.4 79.6 - 97.8 FL    MCH 31.3 26.1 - 32.9 PG    MCHC 33.1 31.4 - 35.0 g/dL    RDW 15.0 (H) 11.9 - 14.6 %    PLATELET 422 268 - 573 K/uL    MPV 10.0 (L) 10.8 - 14.1 FL    DF AUTOMATED      NEUTROPHILS 80 (H) 43 - 78 %    LYMPHOCYTES 14 13 - 44 %    MONOCYTES 5 4.0 - 12.0 %    EOSINOPHILS 1 0.5 - 7.8 %    BASOPHILS 0 0.0 - 2.0 %    IMMATURE GRANULOCYTES 0.3 0.0 - 5.0 %    ABS. NEUTROPHILS 11.3 (H) 1.7 - 8.2 K/UL    ABS. LYMPHOCYTES 2.0 0.5 - 4.6 K/UL    ABS. MONOCYTES 0.7 0.1 - 1.3 K/UL    ABS. EOSINOPHILS 0.1 0.0 - 0.8 K/UL    ABS. BASOPHILS 0.0 0.0 - 0.2 K/UL    ABS. IMM.  GRANS. 0.0 0.0 - 0.5 K/UL   METABOLIC PANEL, COMPREHENSIVE    Collection Time: 02/07/17  8:18 PM   Result Value Ref Range    Sodium 142 136 - 145 mmol/L    Potassium 3.4 (L) 3.5 - 5.1 mmol/L    Chloride 107 98 - 107 mmol/L    CO2 24 21 - 32 mmol/L    Anion gap 11 7 - 16 mmol/L    Glucose 121 (H) 65 - 100 mg/dL    BUN 16 6 - 23 MG/DL    Creatinine 0.74 0.6 - 1.0 MG/DL    GFR est AA >60 >60 ml/min/1.73m2    GFR est non-AA >60 >60 ml/min/1.73m2    Calcium 7.8 (L) 8.3 - 10.4 MG/DL    Bilirubin, total 0.1 (L) 0.2 - 1.1 MG/DL    ALT (SGPT) 17 12 - 65 U/L    AST (SGOT) 13 (L) 15 - 37 U/L    Alk. phosphatase 101 50 - 136 U/L    Protein, total 6.8 6.3 - 8.2 g/dL    Albumin 3.3 (L) 3.5 - 5.0 g/dL    Globulin 3.5 2.3 - 3.5 g/dL    A-G Ratio 0.9 (L) 1.2 - 3.5     HCG URINE, QL. - POC    Collection Time: 02/07/17  9:07 PM   Result Value Ref Range    Pregnancy test,urine (POC) NEGATIVE  NEG     URINE MICROSCOPIC    Collection Time: 02/07/17  9:12 PM   Result Value Ref Range    WBC 0-3 0 /hpf    RBC 0-3 0 /hpf    Epithelial cells 5-10 0 /hpf    Bacteria TRACE 0 /hpf    Casts 0-3 0 /lpf    Crystals 0 0 /LPF    Mucus 4+ (H) 0 /lpf       Data Review reviewed  CT from last night revealing ventral hernia with several small bowel loops contained in sac, dilation of loops proximally    Assessment:     Principal Problem:    Incarcerated incisional hernia (2/8/2017)    Active Problems:    Pain, chronic (9/12/2010)      Seizure disorder (HCC) (9/12/2010)      Depression (9/16/2010)      COPD (chronic obstructive pulmonary disease) ([facility] (9/16/2010)      Bipolar disorder ([facility] (10/23/2010)        Plan/Recommendations/Medical Decision Making: Will proceed with incarcerated incisional hernia repair with mesh. Technical details of the procedure are reviewed. Risks reviewed include risks of anesthesia, bleeding, infection, visceral injury, persistent post-operative pain, and hernia recurrence. All questions are answered.         Basia Patricia MD  2/8/2017

## 2017-02-08 NOTE — ROUTINE PROCESS
Dual skin assessment with Tracy Chou RN. Skin is clean, dry, & intact. No noted red or open areas. Up ad vinnie in room. Voiding without difficulty. Oriented to room, call light, & bed controls. Very pleasant & cooperative. Cigarettes & lighter locked up in mediation drawer at nurses station. Has home medications at bedside & is sending home with family. Instructed to call with needs & states understanding.

## 2017-02-08 NOTE — ED PROVIDER NOTES
HPI Comments: 79-year-old white female who had a D&C in 2016 with subsequent perforation and bowel injury. She then underwent hysterectomy and colon repair. She presents with 2 weeks of increased swelling in her epigastric area and today developed nausea and vomiting. 4 she has had normal bowel movements. No diarrhea or fever. No urinary changes. Abdominal pain is aggravated by any palpation. Nausea and vomiting is precipitated by any intake. Patient is a 51 y.o. female presenting with abdominal pain. The history is provided by the patient. Abdominal Pain    Associated symptoms include nausea. Pertinent negatives include no fever, no diarrhea, no constipation, no dysuria, no headaches, no chest pain and no back pain. Past Medical History:   Diagnosis Date    Acute renal failure ([facility] 2010    COPD (chronic obstructive pulmonary disease) ([facility] 2010    Endocrine disease      hypothyroid    Hypertension     Other ill-defined conditions(799.89)      chronic back pain, migraines    Psychiatric disorder      anxiety    Seizure disorder ([facility] 2010    Seizures ([facility]     Thrush 10/23/2010       Past Surgical History:   Procedure Laterality Date    Hx appendectomy      Hx cholecystectomy           Family History:   Problem Relation Age of Onset    Heart Disease Father     Heart Attack Father       age 46   [facility] Mother        Social History     Social History    Marital status:      Spouse name: N/A    Number of children: N/A    Years of education: N/A     Occupational History    Not on file.      Social History Main Topics    Smoking status: Current Every Day Smoker     Packs/day: 1.50     Years: 30.00     Types: Cigarettes    Smokeless tobacco: Never Used    Alcohol use No    Drug use: No      Comment: narcotics    Sexual activity: Not Currently     Other Topics Concern    Not on file     Social History Narrative    ** Merged History Encounter ** ** Data from: 10/20/10 Enc Dept: [facility] EMERGENCY DEPT         ** Data from: 9/16/10 Enc Dept:  [address] with her boyfriend Dhaval Tipton and her son         ALLERGIES: Contrast dye [iodine]; Pcn [penicillins]; Other medication; and Pcn [penicillins]    Review of Systems   Constitutional: Negative for fever. HENT: Negative for congestion. Respiratory: Negative for cough and shortness of breath. Cardiovascular: Negative for chest pain. Gastrointestinal: Positive for abdominal pain and nausea. Negative for constipation and diarrhea. Genitourinary: Negative for dysuria. Musculoskeletal: Negative for back pain and neck pain. Skin: Negative for rash. Neurological: Negative for headaches. Vitals:    02/07/17 2121 02/07/17 2154 02/07/17 2158 02/07/17 2200   BP:  120/70     Pulse:       Resp:       Temp:       SpO2: 93%  (!) 89% 92%   Weight:       Height:                Physical Exam   Constitutional: She appears well-developed and well-nourished. No distress. HENT:   Head: Normocephalic and atraumatic. Mouth/Throat: Oropharynx is clear and moist.   Eyes: Conjunctivae are normal. Pupils are equal, round, and reactive to light. Neck: Normal range of motion. Neck supple. Cardiovascular: Normal rate and regular rhythm. No murmur heard. Pulmonary/Chest: Effort normal and breath sounds normal.   Abdominal:   Tenderness in the epigastric and periumbilical area. Abdomen is morbidly obese and therefore exam is somewhat limited. She has hypoactive bowel sounds. Musculoskeletal: Normal range of motion. Neurological: She is alert. Skin: Skin is warm and dry. Psychiatric: She has a normal mood and affect. Nursing note and vitals reviewed.        MDM  Number of Diagnoses or Management Options  Diagnosis management comments: Lab work reveals a mild leukocytosis 14.2 but is otherwise normal.  Urinalysis normal. CT abdomen and pelvis shows a small umbilical hernia containing a portion of small bowel with associated small bowel obstruction. No signs of bowel ischemia. Patient was treated with IV fluids, IV morphine and Zofran. She is feeling a bit better but still has discomfort with palpation. As she is morbidly obese and I'm unable to definitively reduce this hernia with potential obstruction will contact surgery for possible admission.        Amount and/or Complexity of Data Reviewed  Clinical lab tests: ordered and reviewed  Tests in the radiology section of CPT®: ordered and reviewed  Tests in the medicine section of CPT®: ordered and reviewed  Discuss the patient with other providers: yes  Independent visualization of images, tracings, or specimens: yes    Risk of Complications, Morbidity, and/or Mortality  Presenting problems: moderate  Diagnostic procedures: moderate  Management options: moderate      ED Course       Procedures

## 2017-02-08 NOTE — PERIOP NOTES
Made Dr. Moshe Snider aware that pt's 02 sat is 90-93% on 6l simple mask. Xopenex treatment ordered for patient prior to leaving PACU.

## 2017-02-08 NOTE — ED TRIAGE NOTES
Pt arrives per EMS for complaints of abdominal pain, nausea, vomiting and abdominal swelling x 2 weeks.

## 2017-02-08 NOTE — ROUTINE PROCESS
END OF SHIFT NOTE:    INTAKE/OUTPUT  02/07 0701 - 02/08 0700  In: 123 [I.V.:123]  Out: 250 [Urine:250]  Voiding: YES  Catheter: NO  Drain:              Flatus: Patient does have flatus present. Stool:  0 occurrences. Characteristics:       Emesis: 0 occurrences. Characteristics:        VITAL SIGNS  Patient Vitals for the past 12 hrs:   Temp Pulse Resp BP SpO2   02/08/17 0727 98.5 °F (36.9 °C) 75 16 103/70 90 %   02/08/17 0230 98.3 °F (36.8 °C) 82 17 122/70 90 %   02/08/17 0135 - - - - 93 %   02/08/17 0133 - 80 24 104/63 93 %   02/08/17 0102 - - - - 95 %   02/08/17 0046 - - - - 91 %   02/08/17 0045 - - - (!) 137/91 -   02/08/17 0037 - - - - 96 %   02/08/17 0025 - - - 127/88 95 %   02/08/17 0010 - - - 122/84 93 %   02/08/17 0005 - - - 120/84 94 %   02/07/17 2345 - - - (!) 137/92 91 %   02/07/17 2325 - - - 114/84 91 %   02/07/17 2305 - - - 129/71 95 %   02/07/17 2245 - - - 125/77 97 %   02/07/17 2236 - - - - 95 %   02/07/17 2225 - - - 137/79 96 %   02/07/17 2205 - - - 139/72 97 %   02/07/17 2200 - - - - 92 %   02/07/17 2158 - - - - (!) 89 %   02/07/17 2154 - - - 120/70 -   02/07/17 2121 - - - - 93 %   02/07/17 2116 - - - - 91 %   02/07/17 2115 - - - (!) 178/110 -   02/07/17 2014 98.1 °F (36.7 °C) 68 16 125/84 93 %       Pain Assessment  Pain Intensity 1: 9 (02/08/17 0635)  Pain Location 1: Abdomen  Pain Intervention(s) 1: Medication (see MAR)  Patient Stated Pain Goal: 5    Ambulating  YES    Shift report given to oncoming nurse at the bedside.     Elinor Guthrie LPN

## 2017-02-08 NOTE — PERIOP NOTES
TRANSFER - OUT REPORT:    Verbal report given to Rosas Bal RN(name) on [facility]  being transferred to [facility](unit) for routine post - op       Report consisted of patients Situation, Background, Assessment and   Recommendations(SBAR). Information from the following report(s) Kardex, OR Summary, Intake/Output and MAR was reviewed with the receiving nurse. Lines:   Peripheral IV 02/07/17 Right Hand (Active)   Site Assessment Clean, dry, & intact 2/8/2017  5:01 PM   Phlebitis Assessment 0 2/8/2017  5:01 PM   Infiltration Assessment 0 2/8/2017  5:01 PM   Dressing Status Clean, dry, & intact 2/8/2017  5:01 PM   Dressing Type Tape;Transparent 2/8/2017  5:01 PM   Hub Color/Line Status Pink;Capped 2/8/2017  5:01 PM       Peripheral IV 02/08/17 Left Hand (Active)   Site Assessment Clean, dry, & intact 2/8/2017  5:01 PM   Phlebitis Assessment 0 2/8/2017  5:01 PM   Infiltration Assessment 0 2/8/2017  5:01 PM   Dressing Status Clean, dry, & intact 2/8/2017  5:01 PM   Dressing Type Tape;Transparent 2/8/2017  5:01 PM   Hub Color/Line Status Pink; Infusing 2/8/2017  5:01 PM       Subcutaneous Infusion Line 02/08/17 Left Abdomen (Active)        Opportunity for questions and clarification was provided. Patient transported with:   O2 @ 6 liters via simple mask  VTE prophylaxis orders have been written for [address] [place] [address]. Family updated.

## 2017-02-08 NOTE — ED NOTES
TRANSFER - OUT REPORT:    Verbal report given to Eva(name) on Piedad Del Valle  being transferred to [facility](unit) for routine progression of care       Report consisted of patients Situation, Background, Assessment and   Recommendations(SBAR). Information from the following report(s) ED Summary was reviewed with the receiving nurse. Lines:   Peripheral IV 02/07/17 Right Hand (Active)   Site Assessment Clean, dry, & intact 2/7/2017 10:48 PM   Phlebitis Assessment 0 2/7/2017 10:48 PM   Infiltration Assessment 0 2/7/2017 10:48 PM   Dressing Status Clean, dry, & intact 2/7/2017 10:48 PM        Opportunity for questions and clarification was provided.       Patient transported with:   O2 @ 2 liters

## 2017-02-08 NOTE — ANESTHESIA PREPROCEDURE EVALUATION
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 Cardiovascular    Hypertension: well controlled        Dysrhythmias       Exercise tolerance: >4 METS     GI/Hepatic/Renal                Endo/Other             Other Findings              Physical Exam    Airway  Mallampati: II  TM Distance: 4 - 6 cm  Neck ROM: normal range of motion   Mouth opening: Normal     Cardiovascular    Rhythm: regular           Dental    Dentition: Full upper dentures and Full lower dentures     Pulmonary  Breath sounds clear to auscultation               Abdominal  GI exam deferred       Other Findings            Anesthetic Plan    ASA: 3  Anesthesia type: general          Induction: Intravenous  Anesthetic plan and risks discussed with: Patient

## 2017-02-08 NOTE — PERIOP NOTES
TRANSFER - IN REPORT:    Verbal report received from SALMA Leblanc (name) on Brad Sidhu  being received from second floor [facility](unit) for routine progression of care      Report consisted of patients Situation, Background, Assessment and   Recommendations(SBAR). Information from the following report(s) SBAR, MAR and Recent Results was reviewed with the receiving nurse. Opportunity for questions and clarification was provided. Assessment completed upon patients arrival to unit and care assumed.

## 2017-02-08 NOTE — ANESTHESIA POSTPROCEDURE EVALUATION
Post-Anesthesia Evaluation and Assessment    Patient: Johnella Angelucci MRN: [id number]  SSN: [id number]    YOB: 1970  Age: 51 y.o. Sex: female       Cardiovascular Function/Vital Signs  Visit Vitals    /77    Pulse 80    Temp 36.9 °C (98.4 °F)    Resp 16    Ht 5' 8\" (1.727 m)    Wt 90.7 kg (200 lb)    SpO2 91%    Breastfeeding No    BMI 30.41 kg/m2       Patient is status post general anesthesia for Procedure(s):  REPAIR WITH MESH INCARCERATED INCISIONAL HERNIA/ BILATERAL MYOFASCIAL ADVANCEMENT FLAPS OF ABDOMINAL WALLL/INSERTION OF ON Q PAINBUSTER. Nausea/Vomiting: None    Postoperative hydration reviewed and adequate. Pain:  Pain Scale 1: Visual (02/08/17 1701)  Pain Intensity 1: 0 (02/08/17 1701)   Managed    Neurological Status:   Neuro (WDL): Exceptions to WDL (02/08/17 1701)  Neuro  Neurologic State: Sleeping (02/08/17 1701)   At baseline    Mental Status and Level of Consciousness: Arousable    Pulmonary Status:   O2 Device: Oxygen mask (02/08/17 1701)   Adequate oxygenation and airway patent    Complications related to anesthesia: None    Post-anesthesia assessment completed.  No concerns    Signed By: Marialuisa Garces MD     February 8, 2017

## 2017-02-08 NOTE — ROUTINE PROCESS
TRANSFER - IN REPORT:    Verbal report received from [facility] [place] on Johanna Duenas  being received from ED for routine progression of care      Report consisted of patients Situation, Background, Assessment and   Recommendations(SBAR). Information from the following report(s) SBAR, ED Summary and MAR was reviewed with the receiving nurse. Opportunity for questions and clarification was provided. Assessment completed upon patients arrival to unit and care assumed.

## 2017-02-09 ENCOUNTER — APPOINTMENT (OUTPATIENT)
Dept: GENERAL RADIOLOGY | Age: 47
DRG: 353 | End: 2017-02-09
Attending: INTERNAL MEDICINE
Payer: MEDICARE

## 2017-02-09 LAB
ANION GAP BLD CALC-SCNC: 7 MMOL/L (ref 7–16)
BASOPHILS # BLD AUTO: 0 K/UL (ref 0–0.2)
BASOPHILS # BLD: 0 % (ref 0–2)
BNP SERPL-MCNC: 104 PG/ML
BUN SERPL-MCNC: 8 MG/DL (ref 6–23)
CALCIUM SERPL-MCNC: 8.3 MG/DL (ref 8.3–10.4)
CHLORIDE SERPL-SCNC: 97 MMOL/L (ref 98–107)
CO2 SERPL-SCNC: 32 MMOL/L (ref 21–32)
CREAT SERPL-MCNC: 0.67 MG/DL (ref 0.6–1)
DIFFERENTIAL METHOD BLD: ABNORMAL
EOSINOPHIL # BLD: 0.1 K/UL (ref 0–0.8)
EOSINOPHIL NFR BLD: 1 % (ref 0.5–7.8)
ERYTHROCYTE [DISTWIDTH] IN BLOOD BY AUTOMATED COUNT: 14.9 % (ref 11.9–14.6)
GLUCOSE SERPL-MCNC: 138 MG/DL (ref 65–100)
HCT VFR BLD AUTO: 38.8 % (ref 35.8–46.3)
HGB BLD-MCNC: 12.4 G/DL (ref 11.7–15.4)
IMM GRANULOCYTES # BLD: 0 K/UL (ref 0–0.5)
IMM GRANULOCYTES NFR BLD AUTO: 0.2 % (ref 0–5)
LYMPHOCYTES # BLD AUTO: 14 % (ref 13–44)
LYMPHOCYTES # BLD: 1.5 K/UL (ref 0.5–4.6)
MCH RBC QN AUTO: 30.5 PG (ref 26.1–32.9)
MCHC RBC AUTO-ENTMCNC: 32 G/DL (ref 31.4–35)
MCV RBC AUTO: 95.6 FL (ref 79.6–97.8)
MONOCYTES # BLD: 1.2 K/UL (ref 0.1–1.3)
MONOCYTES NFR BLD AUTO: 11 % (ref 4–12)
NEUTS SEG # BLD: 7.9 K/UL (ref 1.7–8.2)
NEUTS SEG NFR BLD AUTO: 74 % (ref 43–78)
PLATELET # BLD AUTO: 237 K/UL (ref 150–450)
PMV BLD AUTO: 10 FL (ref 10.8–14.1)
POTASSIUM SERPL-SCNC: 4.1 MMOL/L (ref 3.5–5.1)
RBC # BLD AUTO: 4.06 M/UL (ref 4.05–5.25)
SODIUM SERPL-SCNC: 136 MMOL/L (ref 136–145)
WBC # BLD AUTO: 10.7 K/UL (ref 4.3–11.1)

## 2017-02-09 PROCEDURE — 65270000029 HC RM PRIVATE

## 2017-02-09 PROCEDURE — 99218 HC RM OBSERVATION: CPT

## 2017-02-09 PROCEDURE — 74011000258 HC RX REV CODE- 258: Performed by: SURGERY

## 2017-02-09 PROCEDURE — 77030036554

## 2017-02-09 PROCEDURE — 74011250636 HC RX REV CODE- 250/636: Performed by: SURGERY

## 2017-02-09 PROCEDURE — 77030019938 HC TBNG IV PCA ICUM -A

## 2017-02-09 PROCEDURE — 83880 ASSAY OF NATRIURETIC PEPTIDE: CPT | Performed by: INTERNAL MEDICINE

## 2017-02-09 PROCEDURE — 77030021668 HC NEB PREFIL KT VYRM -A

## 2017-02-09 PROCEDURE — 94640 AIRWAY INHALATION TREATMENT: CPT

## 2017-02-09 PROCEDURE — 74011258636 HC RX REV CODE- 258/636: Performed by: SURGERY

## 2017-02-09 PROCEDURE — 74011000250 HC RX REV CODE- 250: Performed by: SURGERY

## 2017-02-09 PROCEDURE — 74011250636 HC RX REV CODE- 250/636: Performed by: INTERNAL MEDICINE

## 2017-02-09 PROCEDURE — 80048 BASIC METABOLIC PNL TOTAL CA: CPT | Performed by: INTERNAL MEDICINE

## 2017-02-09 PROCEDURE — 36415 COLL VENOUS BLD VENIPUNCTURE: CPT | Performed by: INTERNAL MEDICINE

## 2017-02-09 PROCEDURE — 71010 XR CHEST PORT: CPT

## 2017-02-09 PROCEDURE — 85025 COMPLETE CBC W/AUTO DIFF WBC: CPT | Performed by: INTERNAL MEDICINE

## 2017-02-09 PROCEDURE — 99407 BEHAV CHNG SMOKING > 10 MIN: CPT

## 2017-02-09 PROCEDURE — 77010033678 HC OXYGEN DAILY

## 2017-02-09 PROCEDURE — 74011250637 HC RX REV CODE- 250/637: Performed by: SURGERY

## 2017-02-09 PROCEDURE — 74011000250 HC RX REV CODE- 250: Performed by: INTERNAL MEDICINE

## 2017-02-09 PROCEDURE — 94760 N-INVAS EAR/PLS OXIMETRY 1: CPT

## 2017-02-09 RX ORDER — IPRATROPIUM BROMIDE AND ALBUTEROL SULFATE 2.5; .5 MG/3ML; MG/3ML
3 SOLUTION RESPIRATORY (INHALATION)
Status: DISCONTINUED | OUTPATIENT
Start: 2017-02-09 | End: 2017-02-13 | Stop reason: HOSPADM

## 2017-02-09 RX ORDER — LEVALBUTEROL INHALATION SOLUTION 1.25 MG/3ML
1.25 SOLUTION RESPIRATORY (INHALATION)
Status: DISCONTINUED | OUTPATIENT
Start: 2017-02-09 | End: 2017-02-09

## 2017-02-09 RX ORDER — LEVOFLOXACIN 5 MG/ML
750 INJECTION, SOLUTION INTRAVENOUS EVERY 24 HOURS
Status: DISCONTINUED | OUTPATIENT
Start: 2017-02-09 | End: 2017-02-13 | Stop reason: HOSPADM

## 2017-02-09 RX ADMIN — BACLOFEN 10 MG: 10 TABLET ORAL at 16:07

## 2017-02-09 RX ADMIN — CARVEDILOL 12.5 MG: 12.5 TABLET, FILM COATED ORAL at 17:51

## 2017-02-09 RX ADMIN — CLINDAMYCIN PHOSPHATE 900 MG: 150 INJECTION, SOLUTION, CONCENTRATE INTRAVENOUS at 03:01

## 2017-02-09 RX ADMIN — CLONAZEPAM 1 MG: 0.5 TABLET ORAL at 03:42

## 2017-02-09 RX ADMIN — CLONAZEPAM 1 MG: 0.5 TABLET ORAL at 13:34

## 2017-02-09 RX ADMIN — PHENYTOIN SODIUM 300 MG: 100 CAPSULE ORAL at 21:12

## 2017-02-09 RX ADMIN — CLONAZEPAM 1 MG: 0.5 TABLET ORAL at 19:59

## 2017-02-09 RX ADMIN — HYDROMORPHONE HYDROCHLORIDE 1 MG: 1 INJECTION, SOLUTION INTRAMUSCULAR; INTRAVENOUS; SUBCUTANEOUS at 11:59

## 2017-02-09 RX ADMIN — HYDROMORPHONE HYDROCHLORIDE 1 MG: 1 INJECTION, SOLUTION INTRAMUSCULAR; INTRAVENOUS; SUBCUTANEOUS at 08:15

## 2017-02-09 RX ADMIN — PHENYTOIN SODIUM 300 MG: 100 CAPSULE ORAL at 09:52

## 2017-02-09 RX ADMIN — CLONAZEPAM 1 MG: 0.5 TABLET ORAL at 00:14

## 2017-02-09 RX ADMIN — Medication 10 ML: at 13:35

## 2017-02-09 RX ADMIN — TRAZODONE HYDROCHLORIDE 100 MG: 50 TABLET ORAL at 21:48

## 2017-02-09 RX ADMIN — AMLODIPINE BESYLATE 5 MG: 5 TABLET ORAL at 09:54

## 2017-02-09 RX ADMIN — CLONAZEPAM 1 MG: 0.5 TABLET ORAL at 16:07

## 2017-02-09 RX ADMIN — BACLOFEN 10 MG: 10 TABLET ORAL at 09:53

## 2017-02-09 RX ADMIN — CLINDAMYCIN PHOSPHATE 900 MG: 150 INJECTION, SOLUTION, CONCENTRATE INTRAVENOUS at 09:53

## 2017-02-09 RX ADMIN — CLONAZEPAM 1 MG: 0.5 TABLET ORAL at 09:53

## 2017-02-09 RX ADMIN — ZONISAMIDE 100 MG: 100 CAPSULE ORAL at 09:54

## 2017-02-09 RX ADMIN — SODIUM CHLORIDE, SODIUM LACTATE, POTASSIUM CHLORIDE, CALCIUM CHLORIDE, AND DEXTROSE MONOHYDRATE 125 ML/HR: 600; 310; 30; 20; 5 INJECTION, SOLUTION INTRAVENOUS at 00:59

## 2017-02-09 RX ADMIN — Medication 10 ML: at 05:09

## 2017-02-09 RX ADMIN — LEVOTHYROXINE SODIUM 50 MCG: 50 TABLET ORAL at 06:40

## 2017-02-09 RX ADMIN — SODIUM CHLORIDE, SODIUM LACTATE, POTASSIUM CHLORIDE, CALCIUM CHLORIDE, AND DEXTROSE MONOHYDRATE 125 ML/HR: 600; 310; 30; 20; 5 INJECTION, SOLUTION INTRAVENOUS at 05:09

## 2017-02-09 RX ADMIN — CLINDAMYCIN PHOSPHATE 900 MG: 150 INJECTION, SOLUTION, CONCENTRATE INTRAVENOUS at 16:07

## 2017-02-09 RX ADMIN — HYDROMORPHONE HYDROCHLORIDE 1 MG: 1 INJECTION, SOLUTION INTRAMUSCULAR; INTRAVENOUS; SUBCUTANEOUS at 03:49

## 2017-02-09 RX ADMIN — Medication 10 ML: at 21:49

## 2017-02-09 RX ADMIN — SODIUM CHLORIDE, SODIUM LACTATE, POTASSIUM CHLORIDE, CALCIUM CHLORIDE, AND DEXTROSE MONOHYDRATE 125 ML/HR: 600; 310; 30; 20; 5 INJECTION, SOLUTION INTRAVENOUS at 05:08

## 2017-02-09 RX ADMIN — ENOXAPARIN SODIUM 40 MG: 40 INJECTION SUBCUTANEOUS at 09:52

## 2017-02-09 RX ADMIN — IPRATROPIUM BROMIDE AND ALBUTEROL SULFATE 3 ML: 2.5; .5 SOLUTION RESPIRATORY (INHALATION) at 16:10

## 2017-02-09 RX ADMIN — DIVALPROEX SODIUM 500 MG: 500 TABLET, DELAYED RELEASE ORAL at 21:48

## 2017-02-09 RX ADMIN — IPRATROPIUM BROMIDE AND ALBUTEROL SULFATE 3 ML: 2.5; .5 SOLUTION RESPIRATORY (INHALATION) at 20:32

## 2017-02-09 RX ADMIN — IPRATROPIUM BROMIDE AND ALBUTEROL SULFATE 3 ML: 2.5; .5 SOLUTION RESPIRATORY (INHALATION) at 11:08

## 2017-02-09 RX ADMIN — HYDROMORPHONE HYDROCHLORIDE: 2 INJECTION INTRAMUSCULAR; INTRAVENOUS; SUBCUTANEOUS at 15:55

## 2017-02-09 RX ADMIN — CARVEDILOL 12.5 MG: 12.5 TABLET, FILM COATED ORAL at 09:53

## 2017-02-09 RX ADMIN — IPRATROPIUM BROMIDE AND ALBUTEROL SULFATE 3 ML: 2.5; .5 SOLUTION RESPIRATORY (INHALATION) at 23:59

## 2017-02-09 RX ADMIN — ESCITALOPRAM OXALATE 10 MG: 10 TABLET ORAL at 09:52

## 2017-02-09 RX ADMIN — LEVOFLOXACIN 750 MG: 5 INJECTION, SOLUTION INTRAVENOUS at 12:02

## 2017-02-09 RX ADMIN — BACLOFEN 10 MG: 10 TABLET ORAL at 21:48

## 2017-02-09 NOTE — PROGRESS NOTES
Poor pain control.  (+)cough, but hurting too much for vigorous cough. Tolerating full liquids  Visit Vitals    /72 (BP 1 Location: Left arm, BP Patient Position: Supine)    Pulse 68    Temp 98.1 °F (36.7 °C)    Resp 17    Ht 5' 8\" (1.727 m)    Wt 200 lb (90.7 kg)    SpO2 94%    Breastfeeding No    BMI 30.41 kg/m2      Lungs-coarse throughout  CV-reg  abd-JPs bloody. Incision noncellulitic. Diffuse abdominal pain, appropriately distributed, which does not involve most lateral aspect of either side. Plan-  Change to PCA  Appreciate hospitalist input  Abdominal binder. Hold diet at full liquids.

## 2017-02-09 NOTE — PROGRESS NOTES
Pt seen post op. Sitting up in chair. No complaints or concerns at this time.  No N/V noted after surgery

## 2017-02-09 NOTE — PERIOP NOTES
Pt Xopenex tx completed and pt switched to 02 nc at 4l, unable to maintain 02 sat >90-92%. Notified Dr. Michela Rodriguez, directed to change pt back to 02 mask at 6L and discharge to floor.

## 2017-02-09 NOTE — CONSULTS
HOSPITALIST CONSULT      NAME:  Reji Corrigan   Age:  51 y.o.  :   1970   MRN:   [id number]    PCP: Not On File [facility]    Attending MD: Ottoniel Aguiar MD    Treatment Team: Attending Provider: Carlee Samayoa MD; Utilization Review: Gonzalez Maldonado RN; Care Manager: Maile Le RN; Consulting Provider: Cal Lombardi MD    HPI:     Reji Corrigan is a 17VXK with tobacco abuse and COPD who was admitted on  for abd pain and underwent abd incisional hernia repair yesterday afternoon. She has required increasing amount of oxygen since surgery. She reports some mild cough without sputum production. She does have some mild SOB. Denies fever/chills. Having significant pain from surgery and is unable to take deep breaths. Reports that she takes spiriva daily and albuterol prn (usually 1-2 times daily). Continues to smoke 1ppd. Complete ROS done and is as stated in HPI or otherwise negative    Past Medical History   Diagnosis Date    Acute renal failure ([address] 2010    COPD (chronic obstructive [facility] disease) ([facility] 2010    Endocrine disease      hypothyroid    Hypertension     Other ill-defined conditions(799.89)      chronic back pain, migraines    Psychiatric disorder      anxiety    Seizure disorder ([facility] 2010    Seizures ([facility]     Thrush 10/23/2010        Past Surgical History   Procedure Laterality Date    Hx appendectomy      Hx cholecystectomy          Prior to Admission Medications   Prescriptions Last Dose Informant Patient Reported? Taking?   amlodipine (NORVASC) 5 mg tablet 2017 at Unknown time  Yes Yes   Sig: Take 5 mg by mouth daily. baclofen (LIORESAL) 10 mg tablet 2017 at Unknown time  Yes Yes   Sig: Take 10 mg by mouth three (3) times daily. carvedilol (COREG) 12.5 mg tablet 2017  Yes Yes   Sig: Take 12.5 mg by mouth daily. clonazepam (KLONOPIN) 1 mg tablet 2017  Yes No   Sig: Take  by mouth every four (4) hours.    divalproex ER (DEPAKOTE ER) 250 mg ER tablet 2017  Yes Yes   Sig: Take 250 mg by mouth nightly. escitalopram oxalate (LEXAPRO) 10 mg tablet 2017 at Unknown time  Yes Yes   Sig: Take 10 mg by mouth daily. levothyroxine (SYNTHROID) 50 mcg tablet 2017 at Unknown time  Yes Yes   Sig: Take 50 mcg by mouth Daily (before breakfast). phenytoin ER (DILANTIN EXTENDED) 100 mg ER capsule 2017  Yes No   Sig: Take 300 mg by mouth two (2) times a day. traZODone (DESYREL) 100 mg tablet Not Taking at Unknown time  Yes No   Sig: Take 100 mg by mouth nightly. zonisamide (ZONEGRAN) 100 mg capsule 2017  Yes Yes   Sig: Take 100 mg by mouth daily.       Facility-Administered Medications: None       Allergies   Allergen Reactions    Contrast Dye [Iodine] Anaphylaxis    Pcn [Penicillins] Anaphylaxis    Other Medication Other (comments)     Contrast dye unknown name \"throat closes up\"     Pcn [Penicillins] Itching        Social History   Substance Use Topics    Smoking status: Current Every Day Smoker     Packs/day: 1.50     Years: 30.00     Types: Cigarettes    Smokeless tobacco: Never Used    Alcohol use No        Family History   Problem Relation Age of Onset    Heart Disease Father     Heart Attack Father       age 46    Seizures Mother         Objective:       Visit Vitals    /72 (BP 1 Location: Left arm, BP Patient Position: Supine)    Pulse 68    Temp 98.1 °F (36.7 °C)    Resp 17    Ht 5' 8\" (1.727 m)    Wt 90.7 kg (200 lb)    SpO2 96%    Breastfeeding No    BMI 30.41 kg/m2        Temp (24hrs), Av.2 °F (36.8 °C), Min:97.4 °F (36.3 °C), Max:99.3 °F (37.4 °C)      Oxygen Therapy  O2 Sat (%): 96 % (17)  Pulse via Oximetry: 68 beats per minute (17)  O2 Device: Nasal cannula (17)  O2 Flow Rate (L/min): 4 l/min (17)      Physical Exam:    General:    Alert, cooperative, no distress     Eyes:   Anicteric  Head:   Normocephalic, without obvious abnormality, atraumatic. ENT:  venti mask in place, MMM  Lungs:   Scattered exp wheezing, more coarse on L side  Heart:   Regular rate and rhythm  Abdomen:   Soft, tender to mild palpation diffusely  MSK:  Spontaneously moves extremities. No deformities/lesions. Skin:     Texture, turgor normal.   Neurologic: Alert and oriented x 3, no focal deficits   Psychiatry:      No depression/anxiety. Mood congruent for context. Heme/Lymph/Immune: No petechiae, echymoses, overt signs of bleeding or lymphadenopathy. Data Review:   No results found for this or any previous visit (from the past 24 hour(s)). Imaging /Procedures /Studies     Assessment and Plan: Active Hospital Problems    Diagnosis Date Noted    Incarcerated incisional hernia 02/08/2017    Bipolar disorder ([facility] 10/23/2010    Depression 09/16/2010    COPD (chronic obstructive pulmonary disease) ([facility] 09/16/2010    Tobacco use disorder- 2-3 ppd 09/16/2010    Seizure disorder ([facility] 09/12/2010    Pain, chronic 09/12/2010    Acute respiratory failure with hypoxia ([facility] 09/12/2010       PLAN  ·  Ordered CXR that showed atelectasis vs infiltrate in LLL  · Levaquin for possible PNA  · Encourage incentive spirometer  · Duonebs scheduled q4 (hold spiriva while on this)  · Will hold on steroids for now, per patient request. Could also negative effect wound healing. · Wean O2, goal sats 90-92%  · Obtain basic set of labs: CBC, BMP, BNP   · Encouraged smoking cessation    DVT Prophylaxis: Lovenox    Thank you for this consult. We will continue to follow along with you.       Radha Soares MD  10:41 AM

## 2017-02-10 PROCEDURE — 65270000029 HC RM PRIVATE

## 2017-02-10 PROCEDURE — 77010033678 HC OXYGEN DAILY

## 2017-02-10 PROCEDURE — 74011250637 HC RX REV CODE- 250/637: Performed by: SURGERY

## 2017-02-10 PROCEDURE — 94640 AIRWAY INHALATION TREATMENT: CPT

## 2017-02-10 PROCEDURE — 74011250636 HC RX REV CODE- 250/636: Performed by: SURGERY

## 2017-02-10 PROCEDURE — 94760 N-INVAS EAR/PLS OXIMETRY 1: CPT

## 2017-02-10 PROCEDURE — 74011250636 HC RX REV CODE- 250/636: Performed by: INTERNAL MEDICINE

## 2017-02-10 PROCEDURE — 74011000250 HC RX REV CODE- 250: Performed by: INTERNAL MEDICINE

## 2017-02-10 RX ORDER — CLONAZEPAM 0.5 MG/1
1 TABLET ORAL EVERY 8 HOURS
Status: DISCONTINUED | OUTPATIENT
Start: 2017-02-10 | End: 2017-02-13 | Stop reason: HOSPADM

## 2017-02-10 RX ADMIN — CLONAZEPAM 1 MG: 0.5 TABLET ORAL at 00:26

## 2017-02-10 RX ADMIN — Medication 10 ML: at 13:17

## 2017-02-10 RX ADMIN — LEVOTHYROXINE SODIUM 50 MCG: 50 TABLET ORAL at 06:34

## 2017-02-10 RX ADMIN — LEVOFLOXACIN 750 MG: 5 INJECTION, SOLUTION INTRAVENOUS at 10:49

## 2017-02-10 RX ADMIN — IPRATROPIUM BROMIDE AND ALBUTEROL SULFATE 3 ML: 2.5; .5 SOLUTION RESPIRATORY (INHALATION) at 07:00

## 2017-02-10 RX ADMIN — ENOXAPARIN SODIUM 40 MG: 40 INJECTION SUBCUTANEOUS at 08:07

## 2017-02-10 RX ADMIN — CLONAZEPAM 1 MG: 0.5 TABLET ORAL at 22:27

## 2017-02-10 RX ADMIN — Medication 10 ML: at 06:34

## 2017-02-10 RX ADMIN — BACLOFEN 10 MG: 10 TABLET ORAL at 08:07

## 2017-02-10 RX ADMIN — Medication 10 ML: at 21:52

## 2017-02-10 RX ADMIN — IPRATROPIUM BROMIDE AND ALBUTEROL SULFATE 3 ML: 2.5; .5 SOLUTION RESPIRATORY (INHALATION) at 16:39

## 2017-02-10 RX ADMIN — CARVEDILOL 12.5 MG: 12.5 TABLET, FILM COATED ORAL at 08:07

## 2017-02-10 RX ADMIN — ZONISAMIDE 100 MG: 100 CAPSULE ORAL at 08:07

## 2017-02-10 RX ADMIN — DIVALPROEX SODIUM 500 MG: 500 TABLET, DELAYED RELEASE ORAL at 21:50

## 2017-02-10 RX ADMIN — CLONAZEPAM 1 MG: 0.5 TABLET ORAL at 15:00

## 2017-02-10 RX ADMIN — PHENYTOIN SODIUM 300 MG: 100 CAPSULE ORAL at 08:07

## 2017-02-10 RX ADMIN — BACLOFEN 10 MG: 10 TABLET ORAL at 17:58

## 2017-02-10 RX ADMIN — CLONAZEPAM 1 MG: 0.5 TABLET ORAL at 08:02

## 2017-02-10 RX ADMIN — TRAZODONE HYDROCHLORIDE 100 MG: 50 TABLET ORAL at 21:50

## 2017-02-10 RX ADMIN — PHENYTOIN SODIUM 300 MG: 100 CAPSULE ORAL at 21:50

## 2017-02-10 RX ADMIN — CLONAZEPAM 1 MG: 0.5 TABLET ORAL at 04:17

## 2017-02-10 RX ADMIN — BACLOFEN 10 MG: 10 TABLET ORAL at 21:50

## 2017-02-10 RX ADMIN — IPRATROPIUM BROMIDE AND ALBUTEROL SULFATE 3 ML: 2.5; .5 SOLUTION RESPIRATORY (INHALATION) at 23:35

## 2017-02-10 RX ADMIN — IPRATROPIUM BROMIDE AND ALBUTEROL SULFATE 3 ML: 2.5; .5 SOLUTION RESPIRATORY (INHALATION) at 11:29

## 2017-02-10 RX ADMIN — IPRATROPIUM BROMIDE AND ALBUTEROL SULFATE 3 ML: 2.5; .5 SOLUTION RESPIRATORY (INHALATION) at 20:25

## 2017-02-10 RX ADMIN — ESCITALOPRAM OXALATE 10 MG: 10 TABLET ORAL at 08:06

## 2017-02-10 RX ADMIN — CARVEDILOL 12.5 MG: 12.5 TABLET, FILM COATED ORAL at 18:32

## 2017-02-10 RX ADMIN — IPRATROPIUM BROMIDE AND ALBUTEROL SULFATE 3 ML: 2.5; .5 SOLUTION RESPIRATORY (INHALATION) at 04:20

## 2017-02-10 NOTE — PROGRESS NOTES
Hospitalist Progress Note    Patient: Jeri Aponte MRN: [id number]  SSN: [id number]    YOB: 1970  Age: 51 y.o. Sex: female      Admit Date: 2/7/2017    LOS: 1 day     Subjective:     51year old CF with extensive history of smoking and probably COPD admitted on 2/8 with abdominal pain and underwent abdominal incisional hernia repair. After surgery, her oxygen requirements were elevated. She also complains of an occasional productive cough, but denies SOB.    2/10/17 - The patient is extremely sleepy this morning. She states that she feels better than yesterday. Denies CP/SOB. Abdominal pain 3/10. No F/C/N/V. Review of systems negative except stated above. Objective:     Vitals:    02/10/17 0420 02/10/17 0700 02/10/17 0729 02/10/17 0803   BP:   100/69 107/68   Pulse:   87 86   Resp:   18    Temp:   98.9 °F (37.2 °C)    SpO2: 95% 94% 92% 96%   Weight:       Height:            Intake and Output:  Current Shift: 02/10 0701 - 02/10 1900  In: 96 [I.V.:96]  Out: -     Physical Exam:   GENERAL: sleepy, cooperative, no distress, appears stated age  EYE: conjunctivae/corneas clear. PERRL. THROAT & NECK: normal and no erythema or exudates noted. LUNG: Diminshed bilateral. No wheeze. HEART: regular rate and rhythm, S1, S2 normal, no murmur, no JVD  ABDOMEN: soft, non-tender, non-distended. Bowel sounds normal.   EXTREMITIES:  normal, no cyanosis or edema, 2+ pedal/radial pulses bilaterally  SKIN: no rash or abnormalities  NEUROLOGIC: Ox3. Cranial nerves 2-12 and sensation grossly intact.     Lab/Data Review:  BMP:   Lab Results   Component Value Date/Time     02/09/2017 01:31 PM    K 4.1 02/09/2017 01:31 PM    CL 97 (L) 02/09/2017 01:31 PM    CO2 32 02/09/2017 01:31 PM    AGAP 7 02/09/2017 01:31 PM     (H) 02/09/2017 01:31 PM    BUN 8 02/09/2017 01:31 PM    CREA 0.67 02/09/2017 01:31 PM    GFRAA >60 02/09/2017 01:31 PM    GFRNA >60 02/09/2017 01:31 PM     CBC:   Lab Results Component Value Date/Time    WBC 10.7 02/09/2017 01:31 PM    HGB 12.4 02/09/2017 01:31 PM    HCT 38.8 02/09/2017 01:31 PM     02/09/2017 01:31 PM        Imaging:    Chest X-Ray  1. Suspected left basilar atelectasis/infiltrate. 2. Enlarged cardiac silhouette. Assessment:     Principal Problem:    Incarcerated incisional hernia (2/8/2017)    Active Problems:    Acute respiratory failure with hypoxia ([address] (9/12/2010)      Pain, chronic (9/12/2010)      Seizure disorder ([address] (9/12/2010)      Depression (9/16/2010)      Tobacco use disorder- 2-3 ppd (9/16/2010)      COPD (chronic obstructive pulmonary disease) ([facility] (9/16/2010)      Bipolar disorder ([facility] (10/23/2010)        Plan:     - Continue Levaquin & Clinda  - Strongly encourage ICS use hourly  - Wean PCA and pain meds as appropriate  - Continue schedule Duonebs  - Keep patient's sats >90% - does NOT need to be higher at this point    Thank you for allowing us to participate in the care of this patient. We will follow along with you.     Signed By: Jeremy Pate DO     February 10, 2017

## 2017-02-10 NOTE — PROGRESS NOTES
END OF SHIFT NOTE:    INTAKE/OUTPUT  02/08 0701 - 02/09 0700  In: 3452 [P.O.:480; I.V.:3077]  Out: 0880 [Urine:1600; Drains:75]  Voiding: YES  Catheter: YES  Drain:   Wyline Pester #1 02/08/17 Right Abdomen (Active)   Site Assessment Clean, dry, & intact 2/9/2017  1:30 PM   Dressing Status Clean, dry, & intact 2/9/2017  1:30 PM   Drainage Description Serosanguinous 2/9/2017  1:30 PM   Fer Drain Airleak No 2/9/2017  1:30 PM   Status Patent; Charged;Draining 2/9/2017  1:30 PM   Y Connector Used No 2/9/2017  1:30 PM   Drainage Chamber Level (ml) 30 ml 2/8/2017  6:29 PM   Output (ml) 20 ml 2/9/2017  1:30 PM       Fer Drain #2 02/08/17 Right Abdomen (Active)   Site Assessment Clean, dry, & intact 2/9/2017  1:30 PM   Dressing Status Clean, dry, & intact 2/9/2017  1:30 PM   Drainage Description Serosanguinous 2/9/2017  1:30 PM   Fer Drain Airleak No 2/9/2017  1:30 PM   Status Patent; Charged;Draining 2/9/2017  1:30 PM   Y Connector Used No 2/9/2017  1:30 PM   Drainage Chamber Level (ml) 20 ml 2/8/2017  6:29 PM   Output (ml) 20 ml 2/9/2017  1:30 PM               Flatus: Patient does not have flatus present. Stool:  0 occurrences. Characteristics:       Emesis: 0 occurrences. Characteristics:        VITAL SIGNS  Patient Vitals for the past 12 hrs:   Temp Pulse Resp BP SpO2   02/09/17 1610 - - - - 97 %   02/09/17 1447 98.9 °F (37.2 °C) 71 18 102/72 95 %   02/09/17 1108 - - - - 94 %   02/09/17 0752 98.1 °F (36.7 °C) 68 17 116/72 96 %       Pain Assessment  Pain Intensity 1: 0 (02/09/17 1911)  Pain Location 1: Abdomen  Pain Intervention(s) 1: Encouraged PCA  Patient Stated Pain Goal: 0    Ambulating  Yes with assistance    Shift report given to oncoming nurse at the bedside.     Herber Londono RN

## 2017-02-10 NOTE — ROUTINE PROCESS
END OF SHIFT NOTE:    INTAKE/OUTPUT  02/09 0701 - 02/10 0700  In: 2498.3 [P.O.:480; I.V.:2018.3]  Out: 2145 [Urine:1975; Drains:170]  Voiding: YES  Catheter: YES  Drain:   Shane Saliva #1 02/08/17 Right Abdomen (Active)   Site Assessment Clean, dry, & intact 2/10/2017 12:27 AM   Dressing Status Clean, dry, & intact 2/10/2017 12:27 AM   Drainage Description Serosanguinous 2/10/2017 12:27 AM   Fer Drain Airleak No 2/10/2017 12:27 AM   Status Patent; Charged;Draining;Suction (specify 2/10/2017 12:27 AM   Y Connector Used No 2/10/2017 12:27 AM   Drainage Chamber Level (ml) 30 ml 2/8/2017  6:29 PM   Output (ml) 10 ml 2/10/2017  3:24 AM       Fer Drain #2 02/08/17 Right Abdomen (Active)   Site Assessment Clean, dry, & intact 2/10/2017 12:27 AM   Dressing Status Clean, dry, & intact 2/10/2017 12:27 AM   Drainage Description Serosanguinous 2/10/2017 12:27 AM   Fer Drain Airleak No 2/10/2017 12:27 AM   Status Patent; Charged;Draining;Suction (specify 2/10/2017 12:27 AM   Y Connector Used No 2/10/2017 12:27 AM   Drainage Chamber Level (ml) 20 ml 2/8/2017  6:29 PM   Output (ml) 30 ml 2/10/2017  3:24 AM               Flatus: Patient does not have flatus present. Stool:  0 occurrences. Characteristics:       Emesis: 0 occurrences. Characteristics:        VITAL SIGNS  Patient Vitals for the past 12 hrs:   Temp Pulse Resp BP SpO2   02/10/17 0420 - - - - 95 %   02/10/17 0323 98.1 °F (36.7 °C) 86 18 119/68 96 %   02/09/17 2359 - - - - 96 %   02/09/17 2300 98.8 °F (37.1 °C) 61 17 99/61 94 %   02/09/17 2032 - - - - 95 %       Pain Assessment  Pain Intensity 1: 10 (02/10/17 0703)  Pain Location 1: Abdomen  Pain Intervention(s) 1: Encouraged PCA  Patient Stated Pain Goal: 0    Ambulating  Yes    Shift report given to oncoming nurse at the bedside.     Torres Portillo LPN

## 2017-02-10 NOTE — PROGRESS NOTES
Comfortable. (+)flatus last night  Visit Vitals    /68    Pulse 86    Temp 98.9 °F (37.2 °C)    Resp 18    Ht 5' 8\" (1.727 m)    Wt 200 lb (90.7 kg)    SpO2 96%    Breastfeeding No    BMI 30.41 kg/m2      Very sedate, but responsive  Lungs-less rhonchi  CV-reg  abd-(+)BS. JPs clearing.   Mild central tenderness    Plan-  Decrease sedation- stop PCA basal rate, decrease Klonopin  D/c maurice and encourage mobilization

## 2017-02-11 PROCEDURE — 77010033678 HC OXYGEN DAILY

## 2017-02-11 PROCEDURE — 94640 AIRWAY INHALATION TREATMENT: CPT

## 2017-02-11 PROCEDURE — 94760 N-INVAS EAR/PLS OXIMETRY 1: CPT

## 2017-02-11 PROCEDURE — 74011250636 HC RX REV CODE- 250/636: Performed by: INTERNAL MEDICINE

## 2017-02-11 PROCEDURE — 74011250637 HC RX REV CODE- 250/637: Performed by: SURGERY

## 2017-02-11 PROCEDURE — 65270000029 HC RM PRIVATE

## 2017-02-11 PROCEDURE — 74011250636 HC RX REV CODE- 250/636: Performed by: SURGERY

## 2017-02-11 PROCEDURE — 74011000250 HC RX REV CODE- 250: Performed by: INTERNAL MEDICINE

## 2017-02-11 RX ADMIN — CLONAZEPAM 1 MG: 0.5 TABLET ORAL at 06:27

## 2017-02-11 RX ADMIN — ESCITALOPRAM OXALATE 10 MG: 10 TABLET ORAL at 09:30

## 2017-02-11 RX ADMIN — HYDROMORPHONE HYDROCHLORIDE: 2 INJECTION INTRAMUSCULAR; INTRAVENOUS; SUBCUTANEOUS at 19:33

## 2017-02-11 RX ADMIN — IPRATROPIUM BROMIDE AND ALBUTEROL SULFATE 3 ML: 2.5; .5 SOLUTION RESPIRATORY (INHALATION) at 08:40

## 2017-02-11 RX ADMIN — DIVALPROEX SODIUM 500 MG: 500 TABLET, DELAYED RELEASE ORAL at 21:32

## 2017-02-11 RX ADMIN — AMLODIPINE BESYLATE 5 MG: 5 TABLET ORAL at 09:31

## 2017-02-11 RX ADMIN — BACLOFEN 10 MG: 10 TABLET ORAL at 09:31

## 2017-02-11 RX ADMIN — LEVOTHYROXINE SODIUM 50 MCG: 50 TABLET ORAL at 08:49

## 2017-02-11 RX ADMIN — IPRATROPIUM BROMIDE AND ALBUTEROL SULFATE 3 ML: 2.5; .5 SOLUTION RESPIRATORY (INHALATION) at 23:51

## 2017-02-11 RX ADMIN — IPRATROPIUM BROMIDE AND ALBUTEROL SULFATE 3 ML: 2.5; .5 SOLUTION RESPIRATORY (INHALATION) at 15:26

## 2017-02-11 RX ADMIN — CLONAZEPAM 1 MG: 0.5 TABLET ORAL at 21:32

## 2017-02-11 RX ADMIN — ENOXAPARIN SODIUM 40 MG: 40 INJECTION SUBCUTANEOUS at 09:32

## 2017-02-11 RX ADMIN — CARVEDILOL 12.5 MG: 12.5 TABLET, FILM COATED ORAL at 09:31

## 2017-02-11 RX ADMIN — IPRATROPIUM BROMIDE AND ALBUTEROL SULFATE 3 ML: 2.5; .5 SOLUTION RESPIRATORY (INHALATION) at 11:25

## 2017-02-11 RX ADMIN — PHENYTOIN SODIUM 300 MG: 100 CAPSULE ORAL at 21:31

## 2017-02-11 RX ADMIN — ZONISAMIDE 100 MG: 100 CAPSULE ORAL at 09:31

## 2017-02-11 RX ADMIN — LEVOFLOXACIN 750 MG: 5 INJECTION, SOLUTION INTRAVENOUS at 11:47

## 2017-02-11 RX ADMIN — PHENYTOIN SODIUM 300 MG: 100 CAPSULE ORAL at 09:31

## 2017-02-11 RX ADMIN — IPRATROPIUM BROMIDE AND ALBUTEROL SULFATE 3 ML: 2.5; .5 SOLUTION RESPIRATORY (INHALATION) at 20:57

## 2017-02-11 RX ADMIN — CLONAZEPAM 1 MG: 0.5 TABLET ORAL at 14:56

## 2017-02-11 RX ADMIN — IPRATROPIUM BROMIDE AND ALBUTEROL SULFATE 3 ML: 2.5; .5 SOLUTION RESPIRATORY (INHALATION) at 03:59

## 2017-02-11 RX ADMIN — Medication 10 ML: at 06:32

## 2017-02-11 RX ADMIN — CARVEDILOL 12.5 MG: 12.5 TABLET, FILM COATED ORAL at 17:59

## 2017-02-11 RX ADMIN — Medication 10 ML: at 21:34

## 2017-02-11 RX ADMIN — TRAZODONE HYDROCHLORIDE 100 MG: 50 TABLET ORAL at 21:31

## 2017-02-11 RX ADMIN — BACLOFEN 10 MG: 10 TABLET ORAL at 21:32

## 2017-02-11 NOTE — PROGRESS NOTES
Hospitalist Progress Note    2017  Admit Date: 2017  8:57 PM   NAME: Sharon Andersen   :  1970   MRN:  [id number]   Attending: Parminder Edwards MD  PCP:  Not On File [facility]    SUBJECTIVE:   As previously documented:  '48 year old CF with extensive history of smoking and probably COPD admitted on  with abdominal pain and underwent abdominal incisional hernia repair. After surgery, her oxygen requirements were elevated. She also complains of an occasional productive cough, but denies SOB.'    17- breathing better and more alert. She denies complaints. PCA being weaned.       Review of Systems negative with exception of pertinent positives noted above  PHYSICAL EXAM     Visit Vitals    /84    Pulse 86    Temp 98.9 °F (37.2 °C)    Resp 18    Ht 5' 8\" (1.727 m)    Wt 90.7 kg (200 lb)    SpO2 93%    Breastfeeding No    BMI 30.41 kg/m2      Temp (24hrs), Av.8 °F (37.1 °C), Min:98.4 °F (36.9 °C), Max:99.2 °F (37.3 °C)    Oxygen Therapy  O2 Sat (%): 93 % (17 1145)  Pulse via Oximetry: 87 beats per minute (17 1125)  O2 Device: Nasal cannula (17 1125)  O2 Flow Rate (L/min): 4 l/min (17 1125)  FIO2 (%): 32 % (02/10/17 0700)    Intake/Output Summary (Last 24 hours) at 17 1412  Last data filed at 17 1145   Gross per 24 hour   Intake           1489.2 ml   Output             2215 ml   Net           -725.8 ml      General: No acute distress, awake   Lungs:  Bilateral rhonchi (L>R)   Heart:  Regular rate and rhythm,  No murmur, rub, or gallop  Abdomen: Soft, Non distended, Non tender, Positive bowel sounds  Extremities: No cyanosis, clubbing or edema  Neurologic:  No focal deficits    ASSESSMENT      Active Hospital Problems    Diagnosis Date Noted    Incarcerated incisional hernia 2017    Bipolar disorder ([facility] 10/23/2010    Depression 2010    [facility] 2010    Tobacco use disorder- 2-3 ppd 09/16/2010    Seizure disorder ([facility] 09/12/2010    Pain, chronic 09/12/2010    Acute respiratory failure with hypoxia ([facility] 09/12/2010     Plan:  · Continue levaquin for total of 8 days for L side pneumonia. Day 3 of 8. Stop date placed on levaquin for 2/16/17. · Wean oxygen to keep saturation above 90%. Suspect hypoxia multifactorial related to the pneumonia and sedating medications. Now that she is more awake, suspect we can wean oxygen some. DVT Prophylaxis: Lovenox    Signed By: John Ingram.  Genaro Zelaya MD     February 11, 2017

## 2017-02-11 NOTE — PROGRESS NOTES
PLAN:  OOB/ Ambulate  Pain/ Nausea control  Lovenox 40mg q 24 hours  Full Liquid Diet  IS  IV Abx- Levaquin  Abd binder      ASSESSMENT:  Admit Date: 2/7/2017   3 Days Post-Op  Procedure(s):  REPAIR WITH MESH INCARCERATED INCISIONAL HERNIA/ BILATERAL MYOFASCIAL ADVANCEMENT FLAPS OF ABDOMINAL WALLL/INSERTION OF ON Q PAINBUSTER    Principal Problem:    Incarcerated incisional hernia (2/8/2017)    Active Problems:    Acute respiratory failure with hypoxia ([facility] [address] (9/12/2010)      Pain, chronic (9/12/2010)      Seizure disorder ([facility] [address] (9/12/2010)      Depression (9/16/2010)      Tobacco use disorder- 2-3 ppd (9/16/2010)      COPD (chronic obstructive pulmonary disease) ([facility] [address] (9/16/2010)      Bipolar disorder ([facility] (10/23/2010)         SUBJECTIVE:  Pt sitting up in chair. No new complaints. Voiding without difficulty. +flatus. AF, VSS. OBJECTIVE:  Constitutional: Alert, cooperative, no acute distress; appears stated age   Visit Vitals    /84    Pulse 86    Temp 98.9 °F (37.2 °C)    Resp 18    Ht 5' 8\" (1.727 m)    Wt 200 lb (90.7 kg)    SpO2 92%    Breastfeeding No    BMI 30.41 kg/m2     Eyes:Sclera are clear. ENMT: no external lesions gross hearing normal; no obvious neck masses, no ear or lip lesions  CV: RRR. Normal perfusion  Resp: No JVD. Breathing is  non-labored; no audible wheezing. GI: soft, appropriately tender,  non-distended, BS active. Post op incisions c/d/i. Abd binder in place,  Fer drain #1 - 25 cc out last 24 hours  Fer drain #2 - 90 cc out last 24 hours  Musculoskeletal: unremarkable with normal function. No embolic signs or cyanosis.    Neuro:  Oriented; moves all 4; no focal deficits  Psychiatric: normal affect and mood, no memory impairment      Patient Vitals for the past 24 hrs:   BP Temp Pulse Resp SpO2   02/11/17 1528 - - - - 92 %   02/11/17 1145 132/84 98.9 °F (37.2 °C) 86 18 93 %   02/11/17 1125 - - - - 95 %   02/11/17 0840 - - - - 93 %   02/11/17 0804 108/72 98.9 °F (37.2 °C) 89 16 93 %   02/11/17 0359 - - - - 93 %   02/11/17 0315 119/72 98.6 °F (37 °C) 89 18 94 %   02/10/17 2306 131/67 98.5 °F (36.9 °C) 89 19 93 %   02/10/17 2025 - - - - 95 %   02/10/17 1940 118/75 99.2 °F (37.3 °C) 80 19 92 %   02/10/17 1640 - - - - 95 %   02/10/17 1546 98/62 98.4 °F (36.9 °C) 79 20 95 %     Labs:  Recent Labs      02/09/17   1331   WBC  10.7   HGB  12.4   PLT  237   NA  136   K  4.1   CL  97*   CO2  32   BUN  8   CREA  0.67   GLU  138*     Saleem Fabian, FN-BC    The patient was seen in conjunction with Dr. Bry Valentine who independently evaluated the patient, reviewed the chart and agreed with the assessment and plan.

## 2017-02-11 NOTE — PROGRESS NOTES
END OF SHIFT NOTE:    INTAKE/OUTPUT  02/10 0701 - 02/11 0700  In: 9832 [P.O.:360; I.V.:1025]  Out: 1258 [Urine:1200; Drains:85]  Voiding: YES  Catheter: NO  Drain:   Lindsay Goldstein #1 02/08/17 Right Abdomen (Active)   Site Assessment Clean, dry, & intact 2/10/2017  8:10 AM   Dressing Status Clean, dry, & intact 2/10/2017  8:10 AM   Drainage Description Serosanguinous 2/10/2017  8:10 AM   Fer Drain Airleak No 2/10/2017  8:10 AM   Status Patent; Charged;Draining 2/10/2017  8:10 AM   Y Connector Used No 2/10/2017  8:10 AM   Drainage Chamber Level (ml) 30 ml 2/8/2017  6:29 PM   Output (ml) 10 ml 2/10/2017  7:40 PM       Fer Drain #2 02/08/17 Right Abdomen (Active)   Site Assessment Clean, dry, & intact 2/10/2017  8:10 AM   Dressing Status Clean, dry, & intact 2/10/2017  8:10 AM   Drainage Description Serosanguinous 2/10/2017  8:10 AM   Fer Drain Airleak No 2/10/2017  8:10 AM   Status Patent; Charged;Draining 2/10/2017  8:10 AM   Y Connector Used No 2/10/2017  8:10 AM   Drainage Chamber Level (ml) 20 ml 2/8/2017  6:29 PM   Output (ml) 30 ml 2/10/2017  7:40 PM               Flatus: Patient does have flatus present. Stool:  0 occurrences. Characteristics:       Emesis: 0 occurrences. Characteristics:        VITAL SIGNS  Patient Vitals for the past 12 hrs:   Temp Pulse Resp BP SpO2   02/11/17 0359 - - - - 93 %   02/10/17 2306 98.5 °F (36.9 °C) 89 19 131/67 93 %   02/10/17 2025 - - - - 95 %   02/10/17 1940 99.2 °F (37.3 °C) 80 19 118/75 92 %   02/10/17 1640 - - - - 95 %       Pain Assessment  Pain Intensity 1: 9 (02/10/17 1909)  Pain Location 1: Abdomen  Pain Intervention(s) 1: Encouraged PCA  Patient Stated Pain Goal: 5    Ambulating  Yes  Slept short periods. Using PCA sparingly. Up and down from bed to chair several times tonight    Shift report given to oncoming nurse at the bedside.     Shola Dickerson RN

## 2017-02-12 PROBLEM — J18.9 CAP (COMMUNITY ACQUIRED PNEUMONIA): Status: ACTIVE | Noted: 2017-02-12

## 2017-02-12 LAB
CREAT SERPL-MCNC: 0.53 MG/DL (ref 0.6–1)
PLATELET # BLD AUTO: 276 K/UL (ref 150–450)

## 2017-02-12 PROCEDURE — 74011000250 HC RX REV CODE- 250: Performed by: INTERNAL MEDICINE

## 2017-02-12 PROCEDURE — 74011250636 HC RX REV CODE- 250/636: Performed by: INTERNAL MEDICINE

## 2017-02-12 PROCEDURE — 74011250637 HC RX REV CODE- 250/637: Performed by: SURGERY

## 2017-02-12 PROCEDURE — 82565 ASSAY OF CREATININE: CPT | Performed by: INTERNAL MEDICINE

## 2017-02-12 PROCEDURE — 65270000029 HC RM PRIVATE

## 2017-02-12 PROCEDURE — 36415 COLL VENOUS BLD VENIPUNCTURE: CPT | Performed by: INTERNAL MEDICINE

## 2017-02-12 PROCEDURE — 94640 AIRWAY INHALATION TREATMENT: CPT

## 2017-02-12 PROCEDURE — 74011250636 HC RX REV CODE- 250/636: Performed by: SURGERY

## 2017-02-12 PROCEDURE — 85049 AUTOMATED PLATELET COUNT: CPT | Performed by: INTERNAL MEDICINE

## 2017-02-12 PROCEDURE — 74011250637 HC RX REV CODE- 250/637: Performed by: NURSE PRACTITIONER

## 2017-02-12 PROCEDURE — 94760 N-INVAS EAR/PLS OXIMETRY 1: CPT

## 2017-02-12 PROCEDURE — 77010033678 HC OXYGEN DAILY

## 2017-02-12 RX ORDER — OXYCODONE AND ACETAMINOPHEN 10; 325 MG/1; MG/1
1 TABLET ORAL
Status: DISCONTINUED | OUTPATIENT
Start: 2017-02-12 | End: 2017-02-13 | Stop reason: HOSPADM

## 2017-02-12 RX ADMIN — OXYCODONE HYDROCHLORIDE AND ACETAMINOPHEN 1 TABLET: 10; 325 TABLET ORAL at 13:56

## 2017-02-12 RX ADMIN — CLONAZEPAM 1 MG: 0.5 TABLET ORAL at 05:46

## 2017-02-12 RX ADMIN — PHENYTOIN SODIUM 300 MG: 100 CAPSULE ORAL at 08:17

## 2017-02-12 RX ADMIN — IPRATROPIUM BROMIDE AND ALBUTEROL SULFATE 3 ML: 2.5; .5 SOLUTION RESPIRATORY (INHALATION) at 11:34

## 2017-02-12 RX ADMIN — PHENYTOIN SODIUM 300 MG: 100 CAPSULE ORAL at 20:25

## 2017-02-12 RX ADMIN — BACLOFEN 10 MG: 10 TABLET ORAL at 21:22

## 2017-02-12 RX ADMIN — Medication 10 ML: at 20:27

## 2017-02-12 RX ADMIN — IPRATROPIUM BROMIDE AND ALBUTEROL SULFATE 3 ML: 2.5; .5 SOLUTION RESPIRATORY (INHALATION) at 04:39

## 2017-02-12 RX ADMIN — LEVOFLOXACIN 750 MG: 5 INJECTION, SOLUTION INTRAVENOUS at 12:24

## 2017-02-12 RX ADMIN — IPRATROPIUM BROMIDE AND ALBUTEROL SULFATE 3 ML: 2.5; .5 SOLUTION RESPIRATORY (INHALATION) at 07:43

## 2017-02-12 RX ADMIN — HYDROMORPHONE HYDROCHLORIDE 1 MG: 1 INJECTION, SOLUTION INTRAMUSCULAR; INTRAVENOUS; SUBCUTANEOUS at 17:17

## 2017-02-12 RX ADMIN — Medication 5 ML: at 14:00

## 2017-02-12 RX ADMIN — IPRATROPIUM BROMIDE AND ALBUTEROL SULFATE 3 ML: 2.5; .5 SOLUTION RESPIRATORY (INHALATION) at 15:08

## 2017-02-12 RX ADMIN — IPRATROPIUM BROMIDE AND ALBUTEROL SULFATE 3 ML: 2.5; .5 SOLUTION RESPIRATORY (INHALATION) at 20:19

## 2017-02-12 RX ADMIN — LEVOTHYROXINE SODIUM 50 MCG: 50 TABLET ORAL at 08:18

## 2017-02-12 RX ADMIN — CLONAZEPAM 1 MG: 0.5 TABLET ORAL at 21:22

## 2017-02-12 RX ADMIN — CARVEDILOL 12.5 MG: 12.5 TABLET, FILM COATED ORAL at 17:17

## 2017-02-12 RX ADMIN — BACLOFEN 10 MG: 10 TABLET ORAL at 08:18

## 2017-02-12 RX ADMIN — ZONISAMIDE 100 MG: 100 CAPSULE ORAL at 08:18

## 2017-02-12 RX ADMIN — CARVEDILOL 12.5 MG: 12.5 TABLET, FILM COATED ORAL at 08:18

## 2017-02-12 RX ADMIN — OXYCODONE HYDROCHLORIDE AND ACETAMINOPHEN 1 TABLET: 10; 325 TABLET ORAL at 20:25

## 2017-02-12 RX ADMIN — DIVALPROEX SODIUM 500 MG: 500 TABLET, DELAYED RELEASE ORAL at 21:22

## 2017-02-12 RX ADMIN — BACLOFEN 10 MG: 10 TABLET ORAL at 17:18

## 2017-02-12 RX ADMIN — CLONAZEPAM 1 MG: 0.5 TABLET ORAL at 13:56

## 2017-02-12 RX ADMIN — Medication 10 ML: at 05:46

## 2017-02-12 RX ADMIN — ENOXAPARIN SODIUM 40 MG: 40 INJECTION SUBCUTANEOUS at 08:18

## 2017-02-12 RX ADMIN — ESCITALOPRAM OXALATE 10 MG: 10 TABLET ORAL at 08:17

## 2017-02-12 RX ADMIN — TRAZODONE HYDROCHLORIDE 100 MG: 50 TABLET ORAL at 21:22

## 2017-02-12 RX ADMIN — AMLODIPINE BESYLATE 5 MG: 5 TABLET ORAL at 08:18

## 2017-02-12 NOTE — PROGRESS NOTES
END OF SHIFT NOTE:    INTAKE/OUTPUT  02/10 0701 - 02/11 0700  In: 1388.1 [P.O.:360; I.V.:1028.1]  Out: 1915 [Urine:1800; Drains:115]  Voiding: YES  Catheter: NO  Drain:   Caprice Pastures #1 02/08/17 Right Abdomen (Active)   Site Assessment Clean, dry, & intact 2/10/2017  8:10 AM   Dressing Status Clean, dry, & intact 2/10/2017  8:10 AM   Drainage Description Serosanguinous 2/10/2017  8:10 AM   Fer Drain Airleak No 2/10/2017  8:10 AM   Status Patent; Charged;Draining 2/10/2017  8:10 AM   Y Connector Used No 2/10/2017  8:10 AM   Drainage Chamber Level (ml) 30 ml 2/8/2017  6:29 PM   Output (ml) 0 ml 2/11/2017  3:15 AM       Fer Drain #2 02/08/17 Right Abdomen (Active)   Site Assessment Clean, dry, & intact 2/10/2017  8:10 AM   Dressing Status Clean, dry, & intact 2/10/2017  8:10 AM   Drainage Description Serosanguinous 2/10/2017  8:10 AM   Fer Drain Airleak No 2/10/2017  8:10 AM   Status Patent; Charged;Draining 2/10/2017  8:10 AM   Y Connector Used No 2/10/2017  8:10 AM   Drainage Chamber Level (ml) 20 ml 2/8/2017  6:29 PM   Output (ml) 30 ml 2/11/2017  3:15 AM               Flatus: Patient does have flatus present. Stool:  0 occurrences. Characteristics:       Emesis: 0 occurrences. Characteristics:        VITAL SIGNS  Patient Vitals for the past 12 hrs:   Temp Pulse Resp BP SpO2   02/11/17 1601 98.5 °F (36.9 °C) 81 16 111/74 94 %   02/11/17 1528 - - - - 92 %   02/11/17 1145 98.9 °F (37.2 °C) 86 18 132/84 93 %   02/11/17 1125 - - - - 95 %   02/11/17 0840 - - - - 93 %   02/11/17 0804 98.9 °F (37.2 °C) 89 16 108/72 93 %       Pain Assessment  Pain Intensity 1: 0 (02/11/17 0656)  Pain Location 1: Abdomen  Pain Intervention(s) 1: Encouraged PCA  Patient Stated Pain Goal: 5    Ambulating  No    Shift report given to oncoming nurse at the bedside. Sat up in chair most of the day, tolerated well. Lungs remain coarse, encouraged to use IS.     Adam Lang RN

## 2017-02-12 NOTE — PROGRESS NOTES
END OF SHIFT NOTE:    INTAKE/OUTPUT  02/11 0701 - 02/12 0700  In: 584.1 [P.O.:120; I.V.:464.1]  Out: 4877 [Urine:1250; Drains:40]  Voiding: YES  Catheter: NO  Drain:   Ellie Multani #1 02/08/17 Right Abdomen (Active)   Site Assessment Clean, dry, & intact 2/10/2017  8:10 AM   Dressing Status Clean, dry, & intact 2/10/2017  8:10 AM   Drainage Description Serosanguinous 2/10/2017  8:10 AM   Fer Drain Airleak No 2/10/2017  8:10 AM   Status Patent; Charged;Draining 2/10/2017  8:10 AM   Y Connector Used No 2/10/2017  8:10 AM   Drainage Chamber Level (ml) 30 ml 2/8/2017  6:29 PM   Output (ml) 0 ml 2/11/2017  7:34 PM       Fer Drain #2 02/08/17 Right Abdomen (Active)   Site Assessment Clean, dry, & intact 2/10/2017  8:10 AM   Dressing Status Clean, dry, & intact 2/10/2017  8:10 AM   Drainage Description Serosanguinous 2/10/2017  8:10 AM   Fer Drain Airleak No 2/10/2017  8:10 AM   Status Patent; Charged;Draining 2/10/2017  8:10 AM   Y Connector Used No 2/10/2017  8:10 AM   Drainage Chamber Level (ml) 20 ml 2/8/2017  6:29 PM   Output (ml) 0 ml 2/11/2017  7:34 PM               Flatus: Patient does have flatus present. Stool:  0 occurrences. Characteristics:       Emesis: 0 occurrences. Characteristics:        VITAL SIGNS  Patient Vitals for the past 12 hrs:   Temp Pulse Resp BP SpO2   02/11/17 2351 - - - - 95 %   02/11/17 2314 98 °F (36.7 °C) 79 18 114/71 94 %   02/11/17 2057 - - - - 92 %   02/11/17 1900 98.4 °F (36.9 °C) 82 16 112/71 94 %       Pain Assessment  Pain Intensity 1: 0 (02/11/17 0656)  Pain Location 1: Abdomen  Pain Intervention(s) 1: Encouraged PCA  Patient Stated Pain Goal: 5    Ambulating  Yes  Slept intermittently. Using PCA  With relief. Abd binder remains on. Shift report given to oncoming nurse at the bedside.     Lefty Reis RN

## 2017-02-12 NOTE — PROGRESS NOTES
PLAN:  OOB/ Ambulate  Pain Control - DC PCA and start oral pain meds  Lovenox 40mg q 24 hours  GI soft  IS  IV Abx- Levaquin  Abd binder    ASSESSMENT:  Admit Date: 2/7/2017   4 Days Post-Op  Procedure(s):  REPAIR WITH MESH INCARCERATED INCISIONAL HERNIA/ BILATERAL MYOFASCIAL ADVANCEMENT FLAPS OF ABDOMINAL WALLL/INSERTION OF ON Q PAINBUSTER    Principal Problem:    Incarcerated incisional hernia (2/8/2017)    Active Problems:    Acute respiratory failure with hypoxia ([address] (9/12/2010)      Pain, chronic (9/12/2010)      Seizure disorder ([address] (9/12/2010)      Depression (9/16/2010)      Tobacco use disorder- 2-3 ppd (9/16/2010)      COPD (chronic obstructive pulmonary disease) [facility] (9/16/2010)      Bipolar disorder ([facility] (10/23/2010)      CAP (community acquired pneumonia) (2/12/2017)         SUBJECTIVE:  Pt sitting up in chair. No new complaints. Tolerating Full liquid diet. +flatus, +BM, Voiding without difficulty. AF, VSS. OBJECTIVE:  Constitutional: Alert, cooperative,  no acute distress; appears stated age   Visit Vitals    /69    Pulse (!) 104    Temp 98.2 °F (36.8 °C)    Resp 18    Ht 5' 8\" (1.727 m)    Wt 200 lb (90.7 kg)    SpO2 96%    Breastfeeding No    BMI 30.41 kg/m2     Eyes:Sclera are clear. ENMT: no external lesions gross hearing normal; no obvious neck masses, no ear or lip lesions  CV: RRR. Normal perfusion  Resp: No JVD. Breathing is non-labored; no audible wheezing. GI: soft, appropriately tender,  non-distended, BS active. Post op incisions c/d/i. Abd binder in place, Fer drain #1 - 20 cc out last 24 hours Fer drain #2 - 20 cc out last 24 hours  Musculoskeletal: unremarkable with normal function. No embolic signs or cyanosis.    Neuro: Oriented; moves all 4; no focal deficits  Psychiatric: normal affect and mood, no memory impairment    Patient Vitals for the past 24 hrs:   BP Temp Pulse Resp SpO2   02/12/17 1136 - - - - 96 %   02/12/17 0745 - - - - 95 %   02/12/17 0721 108/69 98.2 °F (36.8 °C) (!) 104 18 96 %   02/12/17 0518 111/87 98 °F (36.7 °C) 81 18 93 %   02/12/17 0439 - - - - 95 %   02/11/17 2351 - - - - 95 %   02/11/17 2314 114/71 98 °F (36.7 °C) 79 18 94 %   02/11/17 2057 - - - - 92 %   02/11/17 1900 112/71 98.4 °F (36.9 °C) 82 16 94 %   02/11/17 1601 111/74 98.5 °F (36.9 °C) 81 16 94 %   02/11/17 1528 - - - - 92 %   02/11/17 1145 132/84 98.9 °F (37.2 °C) 86 18 93 %     Labs:  Recent Labs      02/12/17   0630  02/09/17   1331   WBC   --   10.7   HGB   --   12.4   PLT  276  237   NA   --   136   K   --   4.1   CL   --   97*   CO2   --   32   BUN   --   8   CREA  0.53*  0.67   GLU   --   138*     Malen Guard, FNP-BC    The patient was seen in conjunction with Dr. Estephanie Gonzalez who independently evaluated the patient, reviewed the chart and agreed with the assessment and plan.

## 2017-02-12 NOTE — PROGRESS NOTES
END OF SHIFT NOTE:    INTAKE/OUTPUT  02/10 0701 - 02/11 0700  In: 1388.1 [P.O.:360; I.V.:1028.1]  Out: 1915 [Urine:1800; Drains:115]  Voiding: YES  Catheter: NO  Drain:   Cathi Infield #1 02/08/17 Right Abdomen (Active)   Site Assessment Clean, dry, & intact 2/10/2017  8:10 AM   Dressing Status Clean, dry, & intact 2/10/2017  8:10 AM   Drainage Description Serosanguinous 2/10/2017  8:10 AM   Fer Drain Airleak No 2/10/2017  8:10 AM   Status Patent; Charged;Draining 2/10/2017  8:10 AM   Y Connector Used No 2/10/2017  8:10 AM   Drainage Chamber Level (ml) 30 ml 2/8/2017  6:29 PM   Output (ml) 0 ml 2/11/2017  7:34 PM       Fer Drain #2 02/08/17 Right Abdomen (Active)   Site Assessment Clean, dry, & intact 2/10/2017  8:10 AM   Dressing Status Clean, dry, & intact 2/10/2017  8:10 AM   Drainage Description Serosanguinous 2/10/2017  8:10 AM   Fer Drain Airleak No 2/10/2017  8:10 AM   Status Patent; Charged;Draining 2/10/2017  8:10 AM   Y Connector Used No 2/10/2017  8:10 AM   Drainage Chamber Level (ml) 20 ml 2/8/2017  6:29 PM   Output (ml) 0 ml 2/11/2017  7:34 PM               Flatus: Patient does have flatus present. Stool:  0 occurrences. Characteristics:       Emesis: 0 occurrences. Characteristics:        VITAL SIGNS  Patient Vitals for the past 12 hrs:   Temp Pulse Resp BP SpO2   02/11/17 1601 98.5 °F (36.9 °C) 81 16 111/74 94 %   02/11/17 1528 - - - - 92 %   02/11/17 1145 98.9 °F (37.2 °C) 86 18 132/84 93 %   02/11/17 1125 - - - - 95 %   02/11/17 0840 - - - - 93 %   02/11/17 0804 98.9 °F (37.2 °C) 89 16 108/72 93 %       Pain Assessment  Pain Intensity 1: 0 (02/11/17 0656)  Pain Location 1: Abdomen  Pain Intervention(s) 1: Encouraged PCA  Patient Stated Pain Goal: 5    Ambulating  No    Shift report given to oncoming nurse at the bedside. Sat up in chair most of the day and tolerated well.     Og Moran RN

## 2017-02-12 NOTE — PROGRESS NOTES
Hospitalist Progress Note    2017  Admit Date: 2017  8:57 PM   NAME: Kim Delgadillo   :  1970   MRN:  [id number]   Attending: Maureen Mendoza MD  PCP:  Not On File [facility]    SUBJECTIVE:   As previously documented:  '48 year old CF with extensive history of smoking and probably COPD admitted on  with abdominal pain and underwent abdominal incisional hernia repair. After surgery, her oxygen requirements were elevated. She also complains of an occasional productive cough, but denies SOB.'    17- breathing better and more alert. She denies complaints. PCA being weaned. - she continues to improve and her breathing is better. States she had a bowel movement today. She states she is ready to try to come off PCA, but will defer this to surgery team.  She denies new concerns.       Review of Systems negative with exception of pertinent positives noted above  PHYSICAL EXAM     Visit Vitals    /69    Pulse (!) 104    Temp 98.2 °F (36.8 °C)    Resp 18    Ht 5' 8\" (1.727 m)    Wt 90.7 kg (200 lb)    SpO2 95%    Breastfeeding No    BMI 30.41 kg/m2      Temp (24hrs), Av.3 °F (36.8 °C), Min:98 °F (36.7 °C), Max:98.9 °F (37.2 °C)    Oxygen Therapy  O2 Sat (%): 95 % (17 0745)  Pulse via Oximetry: 87 beats per minute (17 0745)  O2 Device: Nasal cannula (1745)  O2 Flow Rate (L/min): 2 l/min (17 0745)  FIO2 (%): 28 % (17 0745)    Intake/Output Summary (Last 24 hours) at 17 1028  Last data filed at 17 0518   Gross per 24 hour   Intake              642 ml   Output             1690 ml   Net            -1048 ml      General: No acute distress, awake   Lungs:  Lungs clear and sound much better  Heart:  Regular rate and rhythm,  No murmur, rub, or gallop  Abdomen: Soft, Non distended, Non tender, Positive bowel sounds  Extremities: No cyanosis, clubbing or edema  Neurologic:  No focal deficits    ASSESSMENT      Active Hospital Problems Diagnosis Date Noted    CAP (community acquired pneumonia) 02/12/2017    Incarcerated incisional hernia 02/08/2017    Bipolar disorder ([facility] 10/23/2010    Depression 09/16/2010    COPD (chronic obstructive pulmonary disease) ([facility] 09/16/2010    Tobacco use disorder- 2-3 ppd 09/16/2010    Seizure disorder ([facility] 09/12/2010    Pain, chronic 09/12/2010    Acute respiratory failure with hypoxia ([facility] 09/12/2010     Plan:  · Pneumonia- Continue levaquin for total of 8 days for L side pneumonia. Day 3 of 8. Stop date placed on levaquin for 2/16/17. · Hypoxia- secondary to pneumonia in combination with sedating medications. She is down to 2 L NC (recommend to leave on 2 L until off the PCA, then wean to room air) and maintaining oxygen saturations in mid 90s. · Will sign off; please call if we need to see again. DVT Prophylaxis: Lovenox    Signed By: Nikko Agosto.  Carrington Salinas MD     February 12, 2017

## 2017-02-13 VITALS
HEIGHT: 68 IN | SYSTOLIC BLOOD PRESSURE: 92 MMHG | HEART RATE: 72 BPM | RESPIRATION RATE: 18 BRPM | DIASTOLIC BLOOD PRESSURE: 59 MMHG | OXYGEN SATURATION: 91 % | BODY MASS INDEX: 30.31 KG/M2 | WEIGHT: 200 LBS | TEMPERATURE: 98.5 F

## 2017-02-13 PROCEDURE — 74011250637 HC RX REV CODE- 250/637: Performed by: NURSE PRACTITIONER

## 2017-02-13 PROCEDURE — 94760 N-INVAS EAR/PLS OXIMETRY 1: CPT

## 2017-02-13 PROCEDURE — 94640 AIRWAY INHALATION TREATMENT: CPT

## 2017-02-13 PROCEDURE — 74011250636 HC RX REV CODE- 250/636: Performed by: INTERNAL MEDICINE

## 2017-02-13 PROCEDURE — 74011000250 HC RX REV CODE- 250: Performed by: INTERNAL MEDICINE

## 2017-02-13 PROCEDURE — 74011250636 HC RX REV CODE- 250/636: Performed by: SURGERY

## 2017-02-13 PROCEDURE — 77010033678 HC OXYGEN DAILY

## 2017-02-13 PROCEDURE — 74011250637 HC RX REV CODE- 250/637: Performed by: SURGERY

## 2017-02-13 RX ORDER — IBUPROFEN 200 MG
1 TABLET ORAL DAILY
Qty: 7 PATCH | Refills: 0 | Status: SHIPPED | OUTPATIENT
Start: 2017-02-13 | End: 2017-02-20

## 2017-02-13 RX ORDER — OXYCODONE AND ACETAMINOPHEN 10; 325 MG/1; MG/1
1 TABLET ORAL
Qty: 30 TAB | Refills: 0 | Status: SHIPPED
Start: 2017-02-13 | End: 2017-02-21 | Stop reason: SDUPTHER

## 2017-02-13 RX ORDER — HYDROMORPHONE HYDROCHLORIDE 1 MG/ML
1 INJECTION, SOLUTION INTRAMUSCULAR; INTRAVENOUS; SUBCUTANEOUS
Status: DISCONTINUED | OUTPATIENT
Start: 2017-02-13 | End: 2017-02-13 | Stop reason: HOSPADM

## 2017-02-13 RX ORDER — IPRATROPIUM BROMIDE AND ALBUTEROL SULFATE 2.5; .5 MG/3ML; MG/3ML
3 SOLUTION RESPIRATORY (INHALATION)
Qty: 180 NEBULE | Refills: 1 | Status: SHIPPED | OUTPATIENT
Start: 2017-02-13 | End: 2017-02-15 | Stop reason: SDUPTHER

## 2017-02-13 RX ORDER — LEVOFLOXACIN 750 MG/1
750 TABLET ORAL DAILY
Qty: 3 TAB | Refills: 0 | Status: SHIPPED | OUTPATIENT
Start: 2017-02-13 | End: 2017-02-16

## 2017-02-13 RX ADMIN — IPRATROPIUM BROMIDE AND ALBUTEROL SULFATE 3 ML: 2.5; .5 SOLUTION RESPIRATORY (INHALATION) at 04:02

## 2017-02-13 RX ADMIN — PHENYTOIN SODIUM 300 MG: 100 CAPSULE ORAL at 09:43

## 2017-02-13 RX ADMIN — OXYCODONE HYDROCHLORIDE AND ACETAMINOPHEN 1 TABLET: 10; 325 TABLET ORAL at 00:00

## 2017-02-13 RX ADMIN — ONDANSETRON 4 MG: 2 INJECTION INTRAMUSCULAR; INTRAVENOUS at 10:31

## 2017-02-13 RX ADMIN — CLONAZEPAM 1 MG: 0.5 TABLET ORAL at 06:01

## 2017-02-13 RX ADMIN — OXYCODONE HYDROCHLORIDE AND ACETAMINOPHEN 1 TABLET: 10; 325 TABLET ORAL at 06:01

## 2017-02-13 RX ADMIN — IPRATROPIUM BROMIDE AND ALBUTEROL SULFATE 3 ML: 2.5; .5 SOLUTION RESPIRATORY (INHALATION) at 00:06

## 2017-02-13 RX ADMIN — IPRATROPIUM BROMIDE AND ALBUTEROL SULFATE 3 ML: 2.5; .5 SOLUTION RESPIRATORY (INHALATION) at 11:36

## 2017-02-13 RX ADMIN — OXYCODONE HYDROCHLORIDE AND ACETAMINOPHEN 1 TABLET: 10; 325 TABLET ORAL at 10:31

## 2017-02-13 RX ADMIN — BACLOFEN 10 MG: 10 TABLET ORAL at 09:41

## 2017-02-13 RX ADMIN — ENOXAPARIN SODIUM 40 MG: 40 INJECTION SUBCUTANEOUS at 09:43

## 2017-02-13 RX ADMIN — LEVOFLOXACIN 750 MG: 5 INJECTION, SOLUTION INTRAVENOUS at 10:31

## 2017-02-13 RX ADMIN — Medication 5 ML: at 06:04

## 2017-02-13 RX ADMIN — LEVOTHYROXINE SODIUM 50 MCG: 50 TABLET ORAL at 09:42

## 2017-02-13 RX ADMIN — ZONISAMIDE 100 MG: 100 CAPSULE ORAL at 09:42

## 2017-02-13 RX ADMIN — HYDROMORPHONE HYDROCHLORIDE 1 MG: 1 INJECTION, SOLUTION INTRAMUSCULAR; INTRAVENOUS; SUBCUTANEOUS at 01:52

## 2017-02-13 RX ADMIN — ESCITALOPRAM OXALATE 10 MG: 10 TABLET ORAL at 09:42

## 2017-02-13 RX ADMIN — IPRATROPIUM BROMIDE AND ALBUTEROL SULFATE 3 ML: 2.5; .5 SOLUTION RESPIRATORY (INHALATION) at 08:42

## 2017-02-13 NOTE — PROGRESS NOTES
Pt's D/C instructions completed. Verbalized understanding of all instructions including diet, activity, s/sx to alert MD, medications, wound care, and f/u appointment. Family to be here for  for transportation.

## 2017-02-13 NOTE — PROGRESS NOTES
Explained and showed pt how to empty AMINA drain and to record drainage and take measurements to follow up appointment.

## 2017-02-13 NOTE — DISCHARGE INSTRUCTIONS
MD Instructions: Follow-up with Dr. Guillaume Finch in 1 week. Keep incision/staples clean and dry, keep covered to prevent binder from rubbing. Do not apply lotions, creams or ointments to incisions/yonny. Samuel Ibanez will be removed at follow-up appointment. Must wear abdominal binder at all times. AMINA management: Empty drain at least daily or if full. Record amount. Bring this information to your appointment. Diet - as tolerated - GI soft diet. Activity - ambulate - as tolerated - no heavy lifting >10lb. May shower - no tub baths or soaking. No driving while taking narcotics. Do not drink alcohol while taking narcotics. Resume other home medications. Complete antibiotics as directed. If problems or questions arise, please call our office at [phone]. DISCHARGE SUMMARY from Nurse    The following personal items are in your possession at time of discharge:    Dental Appliances: None  Visual Aid: None     Home Medications: None  Jewelry: None  Clothing: None  Other Valuables: Cell Phone  Personal Items Sent to Safe: none          PATIENT INSTRUCTIONS:    After general anesthesia or intravenous sedation, for 24 hours or while taking prescription Narcotics:  · Limit your activities  · Do not drive and operate hazardous machinery  · Do not make important personal or business decisions  · Do  not drink alcoholic beverages  · If you have not urinated within 8 hours after discharge, please contact your surgeon on call.     Report the following to your surgeon:  · Excessive pain, swelling, redness or odor of or around the surgical area  · Temperature over 100.5  · Nausea and vomiting lasting longer than 4 hours or if unable to take medications  · Any signs of decreased circulation or nerve impairment to extremity: change in color, persistent  numbness, tingling, coldness or increase pain  · Any questions        What to do at Home:  Recommended activity: Activity as tolerated, per MD instructions, No heavy lifting    If you experience any of the following symptoms fever > 101, nausea, vomiting, abdominal pain, chest pain, shortness of breath please follow up with MD.      *  Please give a list of your current medications to your Primary Care Provider. *  Please update this list whenever your medications are discontinued, doses are      changed, or new medications (including over-the-counter products) are added. *  Please carry medication information at all times in case of emergency situations. These are general instructions for a healthy lifestyle:    No smoking/ No tobacco products/ Avoid exposure to second hand smoke    Surgeon General's Warning:  Quitting smoking now greatly reduces serious risk to your health. Obesity, smoking, and sedentary lifestyle greatly increases your risk for illness    A healthy diet, regular physical exercise & weight monitoring are important for maintaining a healthy lifestyle    You may be retaining fluid if you have a history of heart failure or if you experience any of the following symptoms:  Weight gain of 3 pounds or more overnight or 5 pounds in a week, increased swelling in our hands or feet or shortness of breath while lying flat in bed. Please call your doctor as soon as you notice any of these symptoms; do not wait until your next office visit. Recognize signs and symptoms of STROKE:    F-face looks uneven    A-arms unable to move or move unevenly    S-speech slurred or non-existent    T-time-call 911 as soon as signs and symptoms begin-DO NOT go       Back to bed or wait to see if you get better-TIME IS BRAIN. Warning Signs of HEART ATTACK     Call 911 if you have these symptoms:   Chest discomfort. Most heart attacks involve discomfort in the center of the chest that lasts more than a few minutes, or that goes away and comes back. It can feel like uncomfortable pressure, squeezing, fullness, or pain.    Discomfort in other areas of the upper body. Symptoms can include pain or discomfort in one or both arms, the back, neck, jaw, or stomach.  Shortness of breath with or without chest discomfort.  Other signs may include breaking out in a cold sweat, nausea, or lightheadedness. Don't wait more than five minutes to call 911 - MINUTES MATTER! Fast action can save your life. Calling 911 is almost always the fastest way to get lifesaving treatment. Emergency Medical Services staff can begin treatment when they arrive -- up to an hour sooner than if someone gets to the hospital by car. The discharge information has been reviewed with the patient. The patient verbalized understanding. Discharge medications reviewed with the patient and appropriate educational materials and side effects teaching were provided. Hernia Repair: What to Expect at [address] are likely to have pain for the next few days. You may also feel like you have the flu, and you may have a low fever and feel tired and nauseated. This is common. You should feel better after a few days and will probably feel much better in 7 days. For several weeks you may feel twinges or pulling in the hernia repair when you move. You may have some bruising on the scrotum and along the penis. This is normal. Men will need to wear a jockstrap or briefs, not boxers, for scrotal support for several days after a groin (inguinal) hernia repair. Spandex bicycle shorts may provide good support. This care sheet gives you a general idea about how long it will take for you to recover. But each person recovers at a different pace. Follow the steps below to get better as quickly as possible. How can you care for yourself at home? Activity  · Rest when you feel tired. Getting enough sleep will help you recover. · Try to walk each day. Start by walking a little more than you did the day before. Bit by bit, increase the amount you walk.  Walking boosts blood flow and helps prevent pneumonia and constipation. · Avoid strenuous activities, such as biking, jogging, weight lifting, or aerobic exercise, until your doctor says it is okay. · Avoid lifting anything that would make you strain. This may include heavy grocery bags and milk containers, a heavy briefcase or backpack, cat litter or dog food bags, a vacuum , or a child. · You may drive when you are no longer taking pain medicine and can quickly move your foot from the gas pedal to the brake. You must also be able to sit comfortably for a long period of time, even if you do not plan to go far. You might get caught in traffic. · Most people are able to return to work within 1 to 2 weeks after surgery. · You may shower 24 to 48 hours after surgery, if your doctor okays it. Pat the cut (incision) dry. Do not take a bath for the first 2 weeks, or until your doctor tells you it is okay. · Your doctor will tell you when you can have sex again. Diet  · You can eat your normal diet. If your stomach is upset, try bland, low-fat foods like plain rice, broiled chicken, toast, and yogurt. · Drink plenty of fluids (unless your doctor tells you not to). · You may notice that your bowel movements are not regular right after your surgery. This is common. Avoid constipation and straining with bowel movements. You may want to take a fiber supplement every day. If you have not had a bowel movement after a couple of days, ask your doctor about taking a mild laxative. Medicines  · Your doctor will tell you if and when you can restart your medicines. He or she will also give you instructions about taking any new medicines. · If you take blood thinners, such as warfarin (Coumadin), clopidogrel (Plavix), or aspirin, be sure to talk to your doctor. He or she will tell you if and when to start taking those medicines again. Make sure that you understand exactly what your doctor wants you to do. · Be safe with medicines.  Take pain medicines exactly as directed. ¨ If the doctor gave you a prescription medicine for pain, take it as prescribed. ¨ If you are not taking a prescription pain medicine, take an over-the-counter medicine such as acetaminophen (Tylenol), ibuprofen (Advil, Motrin), or naproxen (Aleve). Read and follow all instructions on the label. ¨ Do not take two or more pain medicines at the same time unless the doctor told you to. Many pain medicines have acetaminophen, which is Tylenol. Too much acetaminophen (Tylenol) can be harmful. · If your doctor prescribed antibiotics, take them as directed. Do not stop taking them just because you feel better. You need to take the full course of antibiotics. · If you think your pain medicine is making you sick to your stomach:  ¨ Take your medicine after meals (unless your doctor has told you not to). ¨ Ask your doctor for a different pain medicine. Incision care  · If you have strips of tape on the cut (incision) the doctor made, leave the tape on for a week or until it falls off. · If you have staples closing the cut, you will need to visit your doctor in 1 to 2 weeks to have them removed. · Wash the area daily with warm, soapy water and pat it dry. Follow-up care is a key part of your treatment and safety. Be sure to make and go to all appointments, and call your doctor if you are having problems. It's also a good idea to know your test results and keep a list of the medicines you take. When should you call for help? Call 911 anytime you think you may need emergency care. For example, call if:  · You passed out (lost consciousness). · You have sudden chest pain and shortness of breath, or you cough up blood. · You have severe pain in your belly. Call your doctor now or seek immediate medical care if:  · You are sick to your stomach and cannot keep fluids down. · You have signs of a blood clot, such as:  ¨ Pain in your calf, back of the knee, thigh, or groin.   ¨ Redness and swelling in your leg or groin. · You have trouble passing urine or stool, especially if you have mild pain or swelling in your lower belly. · Bright red blood has soaked through the bandage over your incision. Watch closely for any changes in your health, and be sure to contact your doctor if:  · Your swelling is getting worse. · Your swelling is not going down. Where can you learn more? Go to http://ang-claribel.info/. Enter E482 in the search box to learn more about \"Hernia Repair: What to Expect at Home. \"  Current as of: August 9, 2016  Content Version: 11.1  © 4222-3817 [employer]. Care instructions adapted under license by [employer] (which disclaims liability or warranty for this information). If you have questions about a medical condition or this instruction, always ask your healthcare professional. [address] any warranty or liability for your use of this information.

## 2017-02-13 NOTE — ROUTINE PROCESS
D/C from unit with belongings and RX. Accompanied by family and hospital personnel. No distress at time of D/C. Transported via w/c.

## 2017-02-13 NOTE — PROGRESS NOTES
PLAN:  On Q pump empty -- removed  OOB to chair/Ambulate  Pain Control - oral pain meds  Encouraged IS  Abd binder at all times  D/c AMINA #1  Home with AMINA #2    Home today      ASSESSMENT:  Admit Date: 2/7/2017   5 Days Post-Op  Procedure(s):  REPAIR WITH MESH INCARCERATED INCISIONAL HERNIA/ BILATERAL MYOFASCIAL ADVANCEMENT FLAPS OF ABDOMINAL WALLL/INSERTION OF ON Q PAINBUSTER    Principal Problem:    Incarcerated incisional hernia (2/8/2017)    Active Problems:    Acute respiratory failure with hypoxia ([address] (9/12/2010)      Pain, chronic (9/12/2010)      Seizure disorder ([address] (9/12/2010)      Depression (9/16/2010)      Tobacco use disorder- 2-3 ppd (9/16/2010)      COPD (chronic obstructive pulmonary disease) ([facility] (9/16/2010)      Bipolar disorder ([facility] (10/23/2010)      CAP (community acquired pneumonia) (2/12/2017)         SUBJECTIVE:  Resting in recliner. Pain controlled. No new complaints. Tolerating GI soft diet - no N/V. +flatus, +BMx2, voiding. AF, VSS. OBJECTIVE:  Constitutional: Alert, cooperative,  no acute distress; appears stated age   Visit Vitals    BP 95/60    Pulse 70    Temp 98.3 °F (36.8 °C)    Resp 16    Ht 5' 8\" (1.727 m)    Wt 90.7 kg (200 lb)    SpO2 91%    Breastfeeding No    BMI 30.41 kg/m2     Eyes: Sclera are clear. EOMs intact  ENMT: No external lesions, gross hearing normal; no obvious neck masses, no ear or lip lesions  CV: RRR, S1S2. Normal perfusion, pulses palpable  Resp: No JVD. Breathing is non-labored; no audible wheezing. BBS coarse, on RA now  GI: Obese, soft, appropriately tender, non-distended, BS active, incision/staples c/d/i, abd binder in place, AMINA x2, on Q pump   Musculoskeletal: Unremarkable with normal function. No embolic signs or cyanosis.    Neuro: Alert, oriented; moves all 4; no focal deficits  Psychiatric: Normal affect and mood, no memory impairment    Drains:  Fer drain #1 - 20 cc out last 24 hours, serosang  Fer drain #2 - 50 cc out last 24 hours, serosang    Patient Vitals for the past 24 hrs:   BP Temp Pulse Resp SpO2   02/13/17 1139 - - - - 91 %   02/13/17 0843 - - - - 96 %   02/13/17 0728 95/60 98.3 °F (36.8 °C) 70 16 93 %   02/13/17 0405 - - - - 90 %   02/13/17 0217 112/71 98.6 °F (37 °C) 77 20 93 %   02/13/17 0007 - - - - 94 %   02/12/17 2314 114/73 98.1 °F (36.7 °C) 77 18 92 %   02/12/17 2019 - - - - 94 %   02/12/17 1940 91/62 98.5 °F (36.9 °C) 69 18 95 %   02/12/17 1538 101/71 98.7 °F (37.1 °C) 74 18 93 %   02/12/17 1508 - - - - 96 %   02/12/17 1201 97/66 98.2 °F (36.8 °C) 73 18 94 %     Labs:    Recent Labs      02/12/17   0630   PLT  276   CREA  0.53*     NICOLÁS Iraheta

## 2017-02-13 NOTE — PROGRESS NOTES
END OF SHIFT NOTE:    INTAKE/OUTPUT  02/12 0701 - 02/13 0700  In: 383 [I.V.:383]  Out: 1220 [Urine:1150; Drains:70]  Voiding: YES  Catheter: NO  Drain:   Kennard Severs #1 02/08/17 Right Abdomen (Active)   Site Assessment Clean, dry, & intact 2/12/2017  2:17 PM   Dressing Status Clean, dry, & intact 2/12/2017  2:17 PM   Drainage Description Serosanguinous 2/12/2017  2:17 PM   Fer Drain Airleak No 2/12/2017  2:17 PM   Status Patent; Charged;Draining 2/10/2017  8:10 AM   Y Connector Used No 2/10/2017  8:10 AM   Drainage Chamber Level (ml) 30 ml 2/8/2017  6:29 PM   Output (ml) 10 ml 2/13/2017 12:07 AM       Fer Drain #2 02/08/17 Right Abdomen (Active)   Site Assessment Clean, dry, & intact 2/12/2017  2:17 PM   Dressing Status Clean, dry, & intact 2/12/2017  2:17 PM   Drainage Description Serosanguinous 2/12/2017  2:17 PM   Fer Drain Airleak No 2/12/2017  2:17 PM   Status Patent; Charged 2/12/2017  2:17 PM   Y Connector Used No 2/10/2017  8:10 AM   Drainage Chamber Level (ml) 20 ml 2/8/2017  6:29 PM   Output (ml) 20 ml 2/13/2017 12:07 AM               Flatus: Patient does have flatus present. Stool:  0 occurrences. Characteristics:       Emesis: 0 occurrences. Characteristics:        VITAL SIGNS  Patient Vitals for the past 12 hrs:   Temp Pulse Resp BP SpO2   02/13/17 0405 - - - - 90 %   02/13/17 0217 98.6 °F (37 °C) 77 20 112/71 93 %   02/13/17 0007 - - - - 94 %   02/12/17 2314 98.1 °F (36.7 °C) 77 18 114/73 92 %   02/12/17 2019 - - - - 94 %   02/12/17 1940 98.5 °F (36.9 °C) 69 18 91/62 95 %       Pain Assessment  Pain Intensity 1: 9 (02/13/17 0154)  Pain Location 1: Abdomen, Back  Pain Intervention(s) 1: Medication (see MAR)  Patient Stated Pain Goal: 0    Ambulating  Yes  Qball intact;no drainage in j ps    Shift report given to oncoming nurse at the bedside.     Lynette Chaudhry RN

## 2017-02-14 NOTE — OP NOTES
[address]   OPERATIVE REPORT       Name:  Nicho Marquez   MR#:  [id number]   :  1970   Account #:  [de-identified]   Date of Adm:  2017       DATE OF SURGERY: 2017     PREOPERATIVE DIAGNOSIS: Incarcerated umbilical hernia. POSTOPERATIVE DIAGNOSIS: Incarcerated incisional hernia with   obstruction. PROCEDURES:   1. Incarcerated incisional hernia repair. 2. Insertion of mesh. 3. Bilateral myofascial advancement flaps of the anterior   abdominal wall. 4. Insertion of On-Q PainBuster. SURGEON: Nahum Carolina MD.    ANESTHESIA: General plus local.    COMPLICATIONS: None. INDICATIONS: As outlined in the H and P of earlier in the day of   surgery. PROCEDURE: Informed consent was obtained. The patient was   brought to the operating room, placed on the table in supine   position with adequate padding of all pressure points and   compression devices on both lower extremities. After successful   induction of general anesthesia, a Merritt catheter was placed and   the abdomen was prepped and draped sterilely. Beginning in the supraumbilical midline, a roughly 6 cm incision   was made in her old scar, including removal of a small amount of   cicatrix. Cautery was used to dissect through the subcutaneous   tissue down to the fascia. A fascial defect was noted at the   inferior aspect of this incision and thus the incision was   extended several centimeters inferiorly. The hernia sac was   encountered essentially at the level of the umbilicus and was   dissected from the surrounding tissues to which there was fairly   intense scarring. The sac was entered and was noted to have no   entrapped bowel at the time of surgery. Immediately apparent   however, was edema of the sac itself and of small bowel adherent   to the mouth of the defect.  Careful scissor dissection was used   to mobilize the small bowel from the fascia around the defect   extending in a cephalad direction. Digital exploration of the   fascia in a cephalad direction revealed significant laxity of   the fascia and the rectus muscles were palpated several   centimeters, retracted from the midline on each side. At least 5   cm cephalad, there was not only splaying of the fascia, but a   small fascial defect and it was felt as though this whole area,   likely represented mostly scar tissue and not good quality   fascia. Accordingly, the midline was incised with cautery,   essentially to the upper pole of the prior incision, in a   stepwise fashion as bowel was freed from the undersurface of the   fascia. There were no additional defects on the sides of the   midline. Inferiorly, the bowel was mobilized off the abdominal   wall but no additional fascial defects were encountered. The   final defect was approximately 12 cm in length. Again, using scissor dissection, the bowel and omentum were   freed from the abdominal wall for 5-6 cm away from the midline   in all directions. All of the bowel was completely viable with   no evidence of ischemia. No attempt was made at lysis of the   diffuse interloop adhesions as the preoperative obstruction was   clearly related to the hernia itself based on imaging. As   previously mentioned, the rectus muscles were retracted somewhat   laterally on both sides and thus the intervening thick scar   tissue was excised until the fascia itself was encountered which   resulted in a roughly 8 cm wide defect along the length of the   incision. The defect was too wide for primary closure given the   undue tension, resulting from bringing fascial edges in direct   apposition and thus component separation technique was pursued. Accordingly, cautery was used to mobilize the posterior fascial   layers from the overlying rectus muscle along the entire length   of the incision. This was somewhat difficult due to dense scar   tissue extending at least 1-2 cm laterally on both sides.  Once this was completed, the posterior fascia was able to be   approximated in the midline and this was done with running #1   Vicryl suture. A 3 x 6 inch sheet of plain polypropylene mesh was brought to   the field and placed on its long diagonal axis along the length   of the posterior fascial repair which allowed a roughly 2 cm   overlap at both apices. Some of the redundant mesh laterally was   trimmed and the mesh was then sutured to the posterior fascia   with running sutures of 2-0 Prolene. An On-Q PainBuster was brought to the field. The 270 mL   reservoir was filled with 0.5% Marcaine and the catheter was   tunneled from the left lower quadrant into the retrorectus plane   and was looped around the periphery of the space. This was   placed at the more cephalad aspect and a 15 fluted drain was   brought through a separate stab incision in the right lower   quadrant and was draped along the lower aspect of the space. Interrupted sutures of #1 Prolene were then used to approximate   the anterior sheath. However, this was noted to close with   significant tension and thus myofascial advancement was   required. This was done symmetrically on both sides. Cautery was   used to elevate the subcutaneous tissue off of the fascia along   the length of the defect all the way to the lateral edge of the   rectus muscle. At the lateral aspect of the rectus sheath, the   anterior fascia was incised axially to allow a roughly 4-5 cm   advancement flap to be created. Again, this was done   bilaterally. With the myofascial release, the previously placed sutures were   able to be tied in the midline without lateral tension. This   effected a good hernia repair. Another Vani Babe drain was placed in   the subcutaneous space over the midline and was brought out just   above the first drain; both drains were sutured in place with 3-  0 nylon. The wound was irrigated and confirmed to be hemostatic.    It was then closed in layers with Vicryl sutures in the deeper   spaces and the skin was closed with staples. The On-Q PainBuster   was affixed to the skin with Steri-Strips and a Tegaderm. The patient tolerated the above procedure well with no immediate   apparent complications. She was awakened from anesthesia and   extubated in the operating room, taken to recovery in   satisfactory condition.          MD Sam Cast / [place]   D:  02/13/2017   16:03   T:  02/14/2017   08:16   Job #:  [id number]

## 2017-07-05 ENCOUNTER — HOSPITAL ENCOUNTER (EMERGENCY)
Age: 47
Discharge: HOME | End: 2017-07-05
Attending: EMERGENCY MEDICINE
Payer: MEDICARE

## 2017-07-05 ENCOUNTER — APPOINTMENT (OUTPATIENT)
Dept: CT IMAGING | Age: 47
End: 2017-07-05
Attending: EMERGENCY MEDICINE
Payer: MEDICARE

## 2017-07-05 VITALS
TEMPERATURE: 98.2 F | HEIGHT: 68 IN | RESPIRATION RATE: 22 BRPM | SYSTOLIC BLOOD PRESSURE: 103 MMHG | OXYGEN SATURATION: 94 % | WEIGHT: 210 LBS | HEART RATE: 77 BPM | DIASTOLIC BLOOD PRESSURE: 59 MMHG | BODY MASS INDEX: 31.83 KG/M2

## 2017-07-05 DIAGNOSIS — R10.9 ABDOMINAL WALL PAIN: Primary | ICD-10-CM

## 2017-07-05 LAB
ALBUMIN SERPL BCP-MCNC: 3.3 G/DL (ref 3.5–5)
ALBUMIN/GLOB SERPL: 0.7 {RATIO} (ref 1.2–3.5)
ALP SERPL-CCNC: 119 U/L (ref 50–136)
ALT SERPL-CCNC: 19 U/L (ref 12–65)
ANION GAP BLD CALC-SCNC: 9 MMOL/L (ref 7–16)
AST SERPL W P-5'-P-CCNC: 29 U/L (ref 15–37)
BASOPHILS # BLD AUTO: 0 K/UL (ref 0–0.2)
BASOPHILS # BLD: 0 % (ref 0–2)
BILIRUB SERPL-MCNC: 0.2 MG/DL (ref 0.2–1.1)
BUN SERPL-MCNC: 15 MG/DL (ref 6–23)
CALCIUM SERPL-MCNC: 9.2 MG/DL (ref 8.3–10.4)
CHLORIDE SERPL-SCNC: 100 MMOL/L (ref 98–107)
CO2 SERPL-SCNC: 29 MMOL/L (ref 21–32)
CREAT SERPL-MCNC: 0.71 MG/DL (ref 0.6–1)
DIFFERENTIAL METHOD BLD: ABNORMAL
EOSINOPHIL # BLD: 0.2 K/UL (ref 0–0.8)
EOSINOPHIL NFR BLD: 2 % (ref 0.5–7.8)
ERYTHROCYTE [DISTWIDTH] IN BLOOD BY AUTOMATED COUNT: 13.4 % (ref 11.9–14.6)
GLOBULIN SER CALC-MCNC: 4.6 G/DL (ref 2.3–3.5)
GLUCOSE SERPL-MCNC: 96 MG/DL (ref 65–100)
HCT VFR BLD AUTO: 41.1 % (ref 35.8–46.3)
HGB BLD-MCNC: 14.3 G/DL (ref 11.7–15.4)
IMM GRANULOCYTES # BLD: 0 K/UL (ref 0–0.5)
IMM GRANULOCYTES NFR BLD AUTO: 0.3 % (ref 0–5)
LIPASE SERPL-CCNC: 93 U/L (ref 73–393)
LYMPHOCYTES # BLD AUTO: 34 % (ref 13–44)
LYMPHOCYTES # BLD: 3.3 K/UL (ref 0.5–4.6)
MCH RBC QN AUTO: 31.8 PG (ref 26.1–32.9)
MCHC RBC AUTO-ENTMCNC: 34.8 G/DL (ref 31.4–35)
MCV RBC AUTO: 91.5 FL (ref 79.6–97.8)
MONOCYTES # BLD: 0.9 K/UL (ref 0.1–1.3)
MONOCYTES NFR BLD AUTO: 9 % (ref 4–12)
NEUTS SEG # BLD: 5.3 K/UL (ref 1.7–8.2)
NEUTS SEG NFR BLD AUTO: 55 % (ref 43–78)
PLATELET # BLD AUTO: 281 K/UL (ref 150–450)
PMV BLD AUTO: 9.4 FL (ref 10.8–14.1)
POTASSIUM SERPL-SCNC: 5.1 MMOL/L (ref 3.5–5.1)
PROT SERPL-MCNC: 7.9 G/DL (ref 6.3–8.2)
RBC # BLD AUTO: 4.49 M/UL (ref 4.05–5.25)
SODIUM SERPL-SCNC: 138 MMOL/L (ref 136–145)
WBC # BLD AUTO: 9.9 K/UL (ref 4.3–11.1)

## 2017-07-05 PROCEDURE — 74176 CT ABD & PELVIS W/O CONTRAST: CPT

## 2017-07-05 PROCEDURE — 96375 TX/PRO/DX INJ NEW DRUG ADDON: CPT

## 2017-07-05 PROCEDURE — 83690 ASSAY OF LIPASE: CPT

## 2017-07-05 PROCEDURE — 99284 EMERGENCY DEPT VISIT MOD MDM: CPT

## 2017-07-05 PROCEDURE — 96374 THER/PROPH/DIAG INJ IV PUSH: CPT

## 2017-07-05 PROCEDURE — 80053 COMPREHEN METABOLIC PANEL: CPT

## 2017-07-05 PROCEDURE — 74011250636 HC RX REV CODE- 250/636: Performed by: EMERGENCY MEDICINE

## 2017-07-05 PROCEDURE — 85025 COMPLETE CBC W/AUTO DIFF WBC: CPT

## 2017-07-05 PROCEDURE — 81003 URINALYSIS AUTO W/O SCOPE: CPT

## 2017-07-05 RX ORDER — NAPROXEN 500 MG/1
500 TABLET ORAL 2 TIMES DAILY WITH MEALS
Qty: 12 TAB | Refills: 0 | Status: SHIPPED | OUTPATIENT
Start: 2017-07-05 | End: 2017-11-15

## 2017-07-05 RX ORDER — TRAMADOL HYDROCHLORIDE 50 MG/1
50 TABLET ORAL
Qty: 12 TAB | Refills: 0 | Status: SHIPPED | OUTPATIENT
Start: 2017-07-05 | End: 2017-07-05

## 2017-07-05 RX ORDER — ONDANSETRON 2 MG/ML
4 INJECTION INTRAMUSCULAR; INTRAVENOUS
Status: COMPLETED | OUTPATIENT
Start: 2017-07-05 | End: 2017-07-05

## 2017-07-05 RX ORDER — NALBUPHINE HYDROCHLORIDE 10 MG/ML
10 INJECTION, SOLUTION INTRAMUSCULAR; INTRAVENOUS; SUBCUTANEOUS
Status: COMPLETED | OUTPATIENT
Start: 2017-07-05 | End: 2017-07-05

## 2017-07-05 RX ORDER — SODIUM CHLORIDE 0.9 % (FLUSH) 0.9 %
5-10 SYRINGE (ML) INJECTION EVERY 8 HOURS
Status: DISCONTINUED | OUTPATIENT
Start: 2017-07-05 | End: 2017-07-05 | Stop reason: HOSPADM

## 2017-07-05 RX ORDER — SODIUM CHLORIDE 0.9 % (FLUSH) 0.9 %
5-10 SYRINGE (ML) INJECTION AS NEEDED
Status: DISCONTINUED | OUTPATIENT
Start: 2017-07-05 | End: 2017-07-05 | Stop reason: HOSPADM

## 2017-07-05 RX ORDER — NAPROXEN 500 MG/1
500 TABLET ORAL 2 TIMES DAILY WITH MEALS
Qty: 12 TAB | Refills: 0 | Status: SHIPPED | OUTPATIENT
Start: 2017-07-05 | End: 2017-07-05

## 2017-07-05 RX ADMIN — ONDANSETRON 4 MG: 2 INJECTION INTRAMUSCULAR; INTRAVENOUS at 02:34

## 2017-07-05 RX ADMIN — NALBUPHINE HYDROCHLORIDE 10 MG: 10 INJECTION, SOLUTION INTRAMUSCULAR; INTRAVENOUS; SUBCUTANEOUS at 02:34

## 2017-07-05 NOTE — ED PROVIDER NOTES
HPI Comments: Patient presents to emergency room complaining of right upper quadrant and right-sided abdominal pain as per present the past 4-5 days. She states pain started off as a 7/10 intensity is now 10 over 10 intensity the pain does not radiate there is no associated nausea or vomiting patient's been having typical bowel movements of loose stools but she's been having since she had her gallbladder out in the past she's also had a history of a hernia repair in the abdominal wall. She denies any dysuria or hematuria and any increasing or decreasing factors associated with the pain    Patient is a 51 y.o. female presenting with abdominal pain. The history is provided by the patient. Abdominal Pain    This is a new problem. The current episode started more than 2 days ago. The problem occurs constantly. The problem has not changed since onset. The pain is associated with an unknown factor. The pain is located in the RUQ and RLQ. The quality of the pain is sharp. The pain is at a severity of 10/10. The pain is severe. Pertinent negatives include no anorexia, no fever, no belching, no diarrhea, no flatus, no hematochezia, no melena, no nausea, no vomiting, no constipation, no dysuria, no frequency, no arthralgias, no myalgias, no trauma and no chest pain. Nothing worsens the pain. The pain is relieved by nothing. Past workup includes CT scan, surgery.         Past Medical History:   Diagnosis Date    Acute renal failure ([facility] [address] 9/12/2010    COPD (chronic obstructive pulmonary disease) ([facility] [address] 9/16/2010    Endocrine disease     hypothyroid    Hypertension     Other ill-defined conditions     chronic back pain, migraines    Psychiatric disorder     anxiety    Seizure disorder ([address] 9/12/2010    Seizures ([facility] [address]     Thrush 10/23/2010       Past Surgical History:   Procedure Laterality Date    HX APPENDECTOMY      HX CHOLECYSTECTOMY      HX HERNIA REPAIR  2017         Family History:   Problem Relation Age of Onset    Heart Disease Father     Heart Attack Father       age 46    Seizures Mother        Social History     Social History    Marital status: SINGLE     Spouse name: N/A    Number of children: N/A    Years of education: N/A     Occupational History    Not on file. Social History Main Topics    Smoking status: Current Every Day Smoker     Packs/day: 1.50     Years: 30.00     Types: Cigarettes    Smokeless tobacco: Never Used    Alcohol use No    Drug use: No      Comment: narcotics    Sexual activity: Not Currently     Other Topics Concern    Not on file     Social History Narrative    ** Merged History Encounter **         ** Data from: 10/20/10 Enc Dept: [facility] EMERGENCY DEPT         ** Data from: 9/16/10 Enc Dept: [facility] [address]    Lives with her boyfriend Jak Grandchild and her son         ALLERGIES: Contrast dye [iodine]; Pcn [penicillins]; Other medication; and Pcn [penicillins]    Review of Systems   Constitutional: Negative for fever. Cardiovascular: Negative for chest pain. Gastrointestinal: Positive for abdominal pain. Negative for anorexia, constipation, diarrhea, flatus, hematochezia, melena, nausea and vomiting. Genitourinary: Negative for dysuria and frequency. Musculoskeletal: Negative for arthralgias and myalgias. All other systems reviewed and are negative. Vitals:    17 0143 17 0155 17 0201   BP: 126/81 126/80 116/73   Pulse: 80 79 75   Resp: 22     Temp: 98.4 °F (36.9 °C)     SpO2: 94% 96% 95%   Weight: 95.3 kg (210 lb)     Height: 5' 8\" (1.727 m)              Physical Exam   Constitutional: She is oriented to person, place, and time. She appears well-developed and well-nourished. No distress. HENT:   Head: Normocephalic and atraumatic. Eyes: Conjunctivae and EOM are normal. Pupils are equal, round, and reactive to light. Neck: Normal range of motion. Neck supple. Cardiovascular: Normal rate, regular rhythm and normal heart sounds. Pulmonary/Chest: Effort normal and breath sounds normal.   Abdominal: Soft. Bowel sounds are normal. She exhibits no distension and no mass. There is tenderness. There is no rebound and no guarding. Musculoskeletal: Normal range of motion. Neurological: She is alert and oriented to person, place, and time. Skin: Skin is warm and dry. Patient has a chronic appearing wound to the abdominal wall that is excoriated and that she states she's been applying peroxide 2. Psychiatric: She has a normal mood and affect. Her behavior is normal.   Nursing note and vitals reviewed.        MDM  Number of Diagnoses or Management Options  Diagnosis management comments: CT the abdomen will be obtained to evaluate the right-sided abdominal pain all the blood work is normal suspect will be discharged and diagnosed with nonspecific abdominal pain ordered, all wall pain patient was instructed to change local wound care to the abdominal wall sore with warm soap and water twice daily and metabolic ointment and bandage and to discontinue hydrogen peroxide use       Amount and/or Complexity of Data Reviewed  Clinical lab tests: ordered and reviewed  Tests in the radiology section of CPT®: ordered and reviewed  Independent visualization of images, tracings, or specimens: yes    Risk of Complications, Morbidity, and/or Mortality  Presenting problems: moderate  Diagnostic procedures: moderate  Management options: moderate    Patient Progress  Patient progress: stable    ED Course       Procedures

## 2017-07-05 NOTE — ED NOTES
Assumed care from Dr. Lam Close 3 AM awaiting CAT scan results. Plan: if CAT scans normal patients be discharged. Reexamination of the patient feels no acute abdominal findings patient is in no discomfort minimal tenderness to palpation. CT results normal  No signs of infection obstruction or other abnormalities.

## 2017-07-05 NOTE — ED NOTES
I have reviewed discharge instructions with the patient. The patient verbalized understanding regarding discharge instructions and prescription education. The patient exited the facility in an ambulatory fashion with discharge instructions in hand. Pt was in no acute distress upon exiting this facility.

## 2017-07-05 NOTE — ED TRIAGE NOTES
Right upper quadrant pain x 3 - 4 days. Denies fever, vomiting or diarrhea. Also has had a wound on abdomen x 3 -4 months.

## 2017-07-05 NOTE — DISCHARGE INSTRUCTIONS

## 2017-10-29 ENCOUNTER — APPOINTMENT (OUTPATIENT)
Dept: GENERAL RADIOLOGY | Age: 47
End: 2017-10-29
Attending: EMERGENCY MEDICINE
Payer: MEDICARE

## 2017-10-29 ENCOUNTER — HOSPITAL ENCOUNTER (EMERGENCY)
Age: 47
Discharge: HOME | End: 2017-10-29
Attending: EMERGENCY MEDICINE
Payer: MEDICARE

## 2017-10-29 VITALS
SYSTOLIC BLOOD PRESSURE: 138 MMHG | HEIGHT: 68 IN | DIASTOLIC BLOOD PRESSURE: 96 MMHG | BODY MASS INDEX: 34.86 KG/M2 | OXYGEN SATURATION: 92 % | RESPIRATION RATE: 22 BRPM | TEMPERATURE: 97.7 F | HEART RATE: 78 BPM | WEIGHT: 230 LBS

## 2017-10-29 DIAGNOSIS — T58.91XA CARBOXYHEMOGLOBINEMIA, ACCIDENTAL OR UNINTENTIONAL, INITIAL ENCOUNTER: ICD-10-CM

## 2017-10-29 DIAGNOSIS — F17.200 TOBACCO USE DISORDER: ICD-10-CM

## 2017-10-29 DIAGNOSIS — J44.1 COPD EXACERBATION (HCC): Primary | ICD-10-CM

## 2017-10-29 DIAGNOSIS — R09.02 HYPOXIA: ICD-10-CM

## 2017-10-29 LAB
ALBUMIN SERPL-MCNC: 3.3 G/DL (ref 3.5–5)
ALBUMIN/GLOB SERPL: 0.8 {RATIO} (ref 1.2–3.5)
ALP SERPL-CCNC: 125 U/L (ref 50–136)
ALT SERPL-CCNC: 27 U/L (ref 12–65)
ANION GAP SERPL CALC-SCNC: 9 MMOL/L (ref 7–16)
ARTERIAL PATENCY WRIST A: POSITIVE
AST SERPL-CCNC: 50 U/L (ref 15–37)
BASE EXCESS BLDA CALC-SCNC: 5.4 MMOL/L (ref 0–3)
BASOPHILS # BLD: 0 K/UL (ref 0–0.2)
BASOPHILS NFR BLD: 1 % (ref 0–2)
BDY SITE: ABNORMAL
BILIRUB SERPL-MCNC: 0.3 MG/DL (ref 0.2–1.1)
BUN SERPL-MCNC: 11 MG/DL (ref 6–23)
CALCIUM SERPL-MCNC: 8.5 MG/DL (ref 8.3–10.4)
CHLORIDE SERPL-SCNC: 99 MMOL/L (ref 98–107)
CO2 SERPL-SCNC: 30 MMOL/L (ref 21–32)
COHGB MFR BLD: 7 % (ref 0.5–1.5)
CREAT SERPL-MCNC: 0.57 MG/DL (ref 0.6–1)
DIFFERENTIAL METHOD BLD: NORMAL
DO-HGB BLD-MCNC: 6 % (ref 0–5)
EOSINOPHIL # BLD: 0.2 K/UL (ref 0–0.8)
EOSINOPHIL NFR BLD: 2 % (ref 0.5–7.8)
ERYTHROCYTE [DISTWIDTH] IN BLOOD BY AUTOMATED COUNT: 13.8 % (ref 11.9–14.6)
FIO2 ON VENT: 21 %
GLOBULIN SER CALC-MCNC: 4.3 G/DL (ref 2.3–3.5)
GLUCOSE SERPL-MCNC: 112 MG/DL (ref 65–100)
HCO3 BLDA-SCNC: 34 MMOL/L (ref 22–26)
HCT VFR BLD AUTO: 42.8 % (ref 35.8–46.3)
HGB BLD-MCNC: 14.3 G/DL (ref 11.7–15.4)
HGB BLDMV-MCNC: 14.5 GM/DL (ref 11.7–15)
IMM GRANULOCYTES # BLD: 0 K/UL (ref 0–0.5)
IMM GRANULOCYTES NFR BLD: 1 % (ref 0–5)
LACTATE BLD-SCNC: 1.2 MMOL/L (ref 0.5–1.9)
LYMPHOCYTES # BLD: 2.4 K/UL (ref 0.5–4.6)
LYMPHOCYTES NFR BLD: 27 % (ref 13–44)
MCH RBC QN AUTO: 32.3 PG (ref 26.1–32.9)
MCHC RBC AUTO-ENTMCNC: 33.4 G/DL (ref 31.4–35)
MCV RBC AUTO: 96.6 FL (ref 79.6–97.8)
METHGB MFR BLD: 0.4 % (ref 0–1.5)
MONOCYTES # BLD: 0.7 K/UL (ref 0.1–1.3)
MONOCYTES NFR BLD: 8 % (ref 4–12)
NEUTS SEG # BLD: 5.5 K/UL (ref 1.7–8.2)
NEUTS SEG NFR BLD: 61 % (ref 43–78)
OXYHGB MFR BLDA: 87 % (ref 94–97)
PCO2 BLDA: 67 MMHG (ref 35–45)
PH BLDA: 7.32 [PH] (ref 7.35–7.45)
PLATELET # BLD AUTO: 212 K/UL (ref 150–450)
PMV BLD AUTO: 11.6 FL (ref 10.8–14.1)
PO2 BLDA: 71 MMHG (ref 80–105)
POTASSIUM SERPL-SCNC: 5.6 MMOL/L (ref 3.5–5.1)
PROT SERPL-MCNC: 7.6 G/DL (ref 6.3–8.2)
RBC # BLD AUTO: 4.43 M/UL (ref 4.05–5.25)
SAO2 % BLD: 94 % (ref 92–98.5)
SERVICE CMNT-IMP: ABNORMAL
SODIUM SERPL-SCNC: 138 MMOL/L (ref 136–145)
TROPONIN I BLD-MCNC: 0.01 NG/ML (ref 0.02–0.05)
WBC # BLD AUTO: 8.8 K/UL (ref 4.3–11.1)

## 2017-10-29 PROCEDURE — 94640 AIRWAY INHALATION TREATMENT: CPT

## 2017-10-29 PROCEDURE — 71010 XR CHEST PORT: CPT

## 2017-10-29 PROCEDURE — 82803 BLOOD GASES ANY COMBINATION: CPT

## 2017-10-29 PROCEDURE — 80053 COMPREHEN METABOLIC PANEL: CPT | Performed by: EMERGENCY MEDICINE

## 2017-10-29 PROCEDURE — 74011000250 HC RX REV CODE- 250: Performed by: EMERGENCY MEDICINE

## 2017-10-29 PROCEDURE — 36600 WITHDRAWAL OF ARTERIAL BLOOD: CPT

## 2017-10-29 PROCEDURE — 99285 EMERGENCY DEPT VISIT HI MDM: CPT | Performed by: EMERGENCY MEDICINE

## 2017-10-29 PROCEDURE — 84484 ASSAY OF TROPONIN QUANT: CPT

## 2017-10-29 PROCEDURE — 83605 ASSAY OF LACTIC ACID: CPT

## 2017-10-29 PROCEDURE — A9270 NON-COVERED ITEM OR SERVICE: HCPCS | Performed by: EMERGENCY MEDICINE

## 2017-10-29 PROCEDURE — 85025 COMPLETE CBC W/AUTO DIFF WBC: CPT | Performed by: EMERGENCY MEDICINE

## 2017-10-29 PROCEDURE — 93005 ELECTROCARDIOGRAM TRACING: CPT | Performed by: EMERGENCY MEDICINE

## 2017-10-29 PROCEDURE — 74011636637 HC RX REV CODE- 636/637: Performed by: EMERGENCY MEDICINE

## 2017-10-29 RX ORDER — ALBUTEROL SULFATE 0.83 MG/ML
5 SOLUTION RESPIRATORY (INHALATION)
Status: COMPLETED | OUTPATIENT
Start: 2017-10-29 | End: 2017-10-29

## 2017-10-29 RX ORDER — PREDNISONE 20 MG/1
60 TABLET ORAL DAILY
Qty: 12 TAB | Refills: 0 | Status: SHIPPED | OUTPATIENT
Start: 2017-10-29 | End: 2017-11-02

## 2017-10-29 RX ORDER — ALBUTEROL SULFATE 0.83 MG/ML
SOLUTION RESPIRATORY (INHALATION)
Status: DISCONTINUED
Start: 2017-10-29 | End: 2017-10-29 | Stop reason: HOSPADM

## 2017-10-29 RX ADMIN — ALBUTEROL SULFATE 5 MG: 2.5 SOLUTION RESPIRATORY (INHALATION) at 04:32

## 2017-10-29 RX ADMIN — PREDNISONE 60 MG: 50 TABLET ORAL at 07:28

## 2017-10-29 RX ADMIN — ALBUTEROL SULFATE 5 MG: 2.5 SOLUTION RESPIRATORY (INHALATION) at 07:19

## 2017-10-29 NOTE — ED PROVIDER NOTES
HPI Comments: Patient states she has a history of COPD for which she uses an inhaler and a puffer at home. She has used her nebulizer a couple times today without any significant improvement in her shortness of breath. Around midnight she started having left-sided chest pain rating to her left arm and her back with increasing shortness of breath with wheezing. She got concerned and came here for evaluation. She has had similar symptoms in the past with her COPD exacerbation. Elements of this note were created using speech recognition software. As such, errors of speech recognition may be present. Patient is a 49 y.o. female presenting with chest pain and shortness of breath. The history is provided by the patient. Chest Pain (Angina)    Associated symptoms include shortness of breath. Pertinent negatives include no fever, no nausea and no vomiting. Shortness of Breath   Associated symptoms include chest pain. Pertinent negatives include no fever and no vomiting. Past Medical History:   Diagnosis Date    Acute renal failure ([facility] [address] 2010    COPD (chronic obstructive pulmonary disease) ([facility] [address] [facility] 2010    Endocrine disease     hypothyroid    Hypertension     Other ill-defined conditions     chronic back pain, migraines    Psychiatric disorder     anxiety    Seizure disorder ([address] 2010    Seizures ([facility] [facility]     Thrush 10/23/2010       Past Surgical History:   Procedure Laterality Date    HX APPENDECTOMY      HX CHOLECYSTECTOMY      HX HERNIA REPAIR           Family History:   Problem Relation Age of Onset    Heart Disease Father     Heart Attack Father       age 46   [employer] Seizures Mother        Social History     Social History    Marital status: SINGLE     Spouse name: N/A    Number of children: N/A    Years of education: N/A     Occupational History    Not on file.      Social History Main Topics    Smoking status: Current Every Day Smoker     Packs/day: 1.50     Years: 30. 00     Types: Cigarettes    Smokeless tobacco: Never Used    Alcohol use No    Drug use: No      Comment: narcotics    Sexual activity: Not Currently     Other Topics Concern    Not on file     Social History Narrative    ** Merged History Encounter **         ** Data from: 10/20/10 Enc Dept: [facility] EMERGENCY DEPT         ** Data from: 9/16/10 Enc Dept: [address] with her boyfriend Alicia Angeles and her son         ALLERGIES: Contrast dye [iodine]; Pcn [penicillins]; Other medication; and Pcn [penicillins]    Review of Systems   Constitutional: Negative for chills and fever. Respiratory: Positive for shortness of breath. Cardiovascular: Positive for chest pain. Gastrointestinal: Negative for nausea and vomiting. All other systems reviewed and are negative. Vitals:    10/29/17 0429 10/29/17 0435   BP: 147/87    Pulse: 84    Resp: 20    Temp: 97.7 °F (36.5 °C)    SpO2: (!) 79% (!) 79%   Weight: 104.3 kg (230 lb)    Height: 5' 8\" (1.727 m)             Physical Exam   Constitutional: She is oriented to person, place, and time. She appears well-developed and well-nourished. She appears distressed. HENT:   Head: Normocephalic and atraumatic. Eyes: Conjunctivae are normal. Pupils are equal, round, and reactive to light. Neck: Normal range of motion. Neck supple. Cardiovascular: Normal rate and regular rhythm. Pulmonary/Chest: She is in respiratory distress. She has wheezes. Extensive wheezes anteriorly   Musculoskeletal: She exhibits no edema or tenderness. Neurological: She is alert and oriented to person, place, and time. Skin: Skin is warm and dry. Psychiatric: She has a normal mood and affect. Her behavior is normal.   Nursing note and vitals reviewed.        MDM  Number of Diagnoses or Management Options  Carboxyhemoglobinemia, accidental or unintentional, initial encounter: new and does not require workup  COPD exacerbation ([address]): established and worsening  Hypoxia: Tobacco use disorder: established and worsening  Diagnosis management comments: Differential diagnosis: Asthma exacerbation, COPD exacerbation, CHF, acute MI  7:09 AM wheezing is improved. His persisting.   We will repeat her albuterol treatment       Amount and/or Complexity of Data Reviewed  Clinical lab tests: ordered and reviewed  Tests in the radiology section of CPT®: ordered and reviewed  Tests in the medicine section of CPT®: ordered and reviewed  Independent visualization of images, tracings, or specimens: yes    Risk of Complications, Morbidity, and/or Mortality  Presenting problems: high  Diagnostic procedures: moderate  Management options: high    Patient Progress  Patient progress: improved    ED Course       Procedures

## 2017-10-29 NOTE — DISCHARGE INSTRUCTIONS
Chronic Obstructive Pulmonary Disease (COPD): Care Instructions  Your Care Instructions    Chronic obstructive pulmonary disease (COPD) is a general term for a group of lung diseases, including emphysema and chronic bronchitis. People with COPD have decreased airflow in and out of the lungs, which makes it hard to breathe. The airways also can get clogged with thick mucus. Cigarette smoking is a major cause of COPD. Although there is no cure for COPD, you can slow its progress. Following your treatment plan and taking care of yourself can help you feel better and live longer. Follow-up care is a key part of your treatment and safety. Be sure to make and go to all appointments, and call your doctor if you are having problems. It's also a good idea to know your test results and keep a list of the medicines you take. How can you care for yourself at home? ?Staying healthy  ? · Do not smoke. This is the most important step you can take to prevent more damage to your lungs. If you need help quitting, talk to your doctor about stop-smoking programs and medicines. These can increase your chances of quitting for good. ? · Avoid colds and flu. Get a pneumococcal vaccine shot. If you have had one before, ask your doctor whether you need a second dose. Get the flu vaccine every fall. If you must be around people with colds or the flu, wash your hands often. ? · Avoid secondhand smoke, air pollution, and high altitudes. Also avoid cold, dry air and hot, humid air. Stay at home with your windows closed when air pollution is bad. ?Medicines and oxygen therapy  ? · Take your medicines exactly as prescribed. Call your doctor if you think you are having a problem with your medicine. ? · You may be taking medicines such as:  ¨ Bronchodilators. These help open your airways and make breathing easier. Bronchodilators are either short-acting (work for 6 to 9 hours) or long-acting (work for 24 hours).  You inhale most bronchodilators, so they start to act quickly. Always carry your quick-relief inhaler with you in case you need it while you are away from home. ¨ Corticosteroids (prednisone, budesonide). These reduce airway inflammation. They come in pill or inhaled form. You must take these medicines every day for them to work well. ? · A spacer may help you get more inhaled medicine to your lungs. Ask your doctor or pharmacist if a spacer is right for you. If it is, ask how to use it properly. ? · Do not take any vitamins, over-the-counter medicine, or herbal products without talking to your doctor first.   ? · If your doctor prescribed antibiotics, take them as directed. Do not stop taking them just because you feel better. You need to take the full course of antibiotics. ? · Oxygen therapy boosts the amount of oxygen in your blood and helps you breathe easier. Use the flow rate your doctor has recommended, and do not change it without talking to your doctor first.   Activity  ? · Get regular exercise. Walking is an easy way to get exercise. Start out slowly, and walk a little more each day. ? · Pay attention to your breathing. You are exercising too hard if you cannot talk while you are exercising. ? · Take short rest breaks when doing household chores and other activities. ? · Learn breathing methods-such as breathing through pursed lips-to help you become less short of breath. ? · If your doctor has not set you up with a pulmonary rehabilitation program, talk to him or her about whether rehab is right for you. Rehab includes exercise programs, education about your disease and how to manage it, help with diet and other changes, and emotional support. Diet  ? · Eat regular, healthy meals. Use bronchodilators about 1 hour before you eat to make it easier to eat. Eat several small meals instead of three large ones. Drink beverages at the end of the meal. Avoid foods that are hard to chew.    ? · Eat foods that contain protein so that you do not lose muscle mass. ? · Talk with your doctor if you gain too much weight or if you lose weight without trying. ?Mental health  ? · Talk to your family, friends, or a therapist about your feelings. It is normal to feel frightened, angry, hopeless, helpless, and even guilty. Talking openly about bad feelings can help you cope. If these feelings last, talk to your doctor. When should you call for help? Call 911 anytime you think you may need emergency care. For example, call if:  ? · You have severe trouble breathing. ?Call your doctor now or seek immediate medical care if:  ? · You have new or worse trouble breathing. ? · You cough up blood. ? · You have a fever. ? Watch closely for changes in your health, and be sure to contact your doctor if:  ? · You cough more deeply or more often, especially if you notice more mucus or a change in the color of your mucus. ? · You have new or worse swelling in your legs or belly. ? · You are not getting better as expected. Where can you learn more? Go to http://ang-claribel.info/. Kipp Essex in the search box to learn more about \"Chronic Obstructive Pulmonary Disease (COPD): Care Instructions. \"  Current as of: May 12, 2017  Content Version: 11.4  © 5970-4204 [employer]. Care instructions adapted under license by [employer] (which disclaims liability or warranty for this information). If you have questions about a medical condition or this instruction, always ask your healthcare professional. [place] [address] any warranty or liability for your use of this information.

## 2017-10-29 NOTE — ED TRIAGE NOTES
PT arrived to ED c/o  Chest pain that radiates down her left arm and into her back. PT has a Hx of COPD and has been wheezing and SOB for the past several days.  PT has a room O2 sat of 79%

## 2017-10-29 NOTE — ED NOTES
I have reviewed discharge instructions with the patient. The patient verbalized understanding. Patient left ED via Discharge Method: ambulatory to Home with self. Opportunity for questions and clarification provided. Patient given 1 scripts.  No e-sign

## 2017-10-31 LAB
CALCULATED P AXIS, ECG09: 31 DEGREES
CALCULATED R AXIS, ECG10: 150 DEGREES
CALCULATED T AXIS, ECG11: 67 DEGREES
DIAGNOSIS, 93000: NORMAL
P-R INTERVAL, ECG05: 156 MS
Q-T INTERVAL, ECG07: 426 MS
QRS DURATION, ECG06: 106 MS
QTC CALCULATION (BEZET), ECG08: 479 MS
VENTRICULAR RATE, ECG03: 76 BPM

## 2017-11-15 ENCOUNTER — HOSPITAL ENCOUNTER (EMERGENCY)
Age: 47
Discharge: HOME | End: 2017-11-15
Attending: EMERGENCY MEDICINE
Payer: MEDICARE

## 2017-11-15 ENCOUNTER — APPOINTMENT (OUTPATIENT)
Dept: GENERAL RADIOLOGY | Age: 47
End: 2017-11-15
Attending: NURSE PRACTITIONER
Payer: MEDICARE

## 2017-11-15 VITALS
HEART RATE: 68 BPM | TEMPERATURE: 97.9 F | WEIGHT: 200 LBS | RESPIRATION RATE: 18 BRPM | DIASTOLIC BLOOD PRESSURE: 84 MMHG | HEIGHT: 69 IN | SYSTOLIC BLOOD PRESSURE: 130 MMHG | OXYGEN SATURATION: 96 % | BODY MASS INDEX: 29.62 KG/M2

## 2017-11-15 DIAGNOSIS — S20.211A CONTUSION OF RIB ON RIGHT SIDE, INITIAL ENCOUNTER: ICD-10-CM
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
## 2017-11-15 LAB — HCG UR QL: NEGATIVE

## 2017-11-15 PROCEDURE — 99284 EMERGENCY DEPT VISIT MOD MDM: CPT | Performed by: NURSE PRACTITIONER

## 2017-11-15 PROCEDURE — 74011250636 HC RX REV CODE- 250/636: Performed by: NURSE PRACTITIONER

## 2017-11-15 PROCEDURE — 81025 URINE PREGNANCY TEST: CPT

## 2017-11-15 PROCEDURE — 71100 X-RAY EXAM RIBS UNI 2 VIEWS: CPT

## 2017-11-15 PROCEDURE — 81003 URINALYSIS AUTO W/O SCOPE: CPT | Performed by: NURSE PRACTITIONER

## 2017-11-15 PROCEDURE — 74020 XR ABD (AP AND ERECT OR DECUB): CPT

## 2017-11-15 PROCEDURE — 96372 THER/PROPH/DIAG INJ SC/IM: CPT | Performed by: NURSE PRACTITIONER

## 2017-11-15 RX ORDER — NAPROXEN 500 MG/1
500 TABLET ORAL 2 TIMES DAILY WITH MEALS
Qty: 20 TAB | Refills: 0 | Status: SHIPPED | OUTPATIENT
Start: 2017-11-15 | End: 2017-11-25

## 2017-11-15 RX ORDER — KETOROLAC TROMETHAMINE 30 MG/ML
30 INJECTION, SOLUTION INTRAMUSCULAR; INTRAVENOUS
Status: COMPLETED | OUTPATIENT
Start: 2017-11-15 | End: 2017-11-15

## 2017-11-15 RX ADMIN — KETOROLAC TROMETHAMINE 30 MG: 30 INJECTION, SOLUTION INTRAMUSCULAR at 10:03

## 2017-11-15 NOTE — DISCHARGE INSTRUCTIONS
Chest Contusion: Care Instructions  Your Care Instructions  A chest contusion, or bruise, is caused by a fall or direct blow to the chest. Car crashes, falls, getting punched, and injury from bicycle handlebars are common causes of chest contusions. A very forceful blow to the chest can injure the heart or blood vessels in the chest, the lungs, the airway, the liver, or the spleen. Pain may be caused by an injury to muscles, cartilage, or ribs. Deep breathing, coughing, or sneezing can increase your pain. Lying on the injured area also can cause pain. Follow-up care is a key part of your treatment and safety. Be sure to make and go to all appointments, and call your doctor if you are having problems. It's also a good idea to know your test results and keep a list of the medicines you take. How can you care for yourself at home? · Rest and protect the injured or sore area. Stop, change, or take a break from any activity that may be causing your pain. · Put ice or a cold pack on the area for 10 to 20 minutes at a time. Put a thin cloth between the ice and your skin. · After 2 or 3 days, if your swelling is gone, apply a heating pad set on low or a warm cloth to your chest. Some doctors suggest that you go back and forth between hot and cold. Put a thin cloth between the heating pad and your skin. · Do not wrap or tape your ribs for support. This may cause you to take smaller breaths, which could increase your risk of pneumonia and lung collapse. · Ask your doctor if you can take an over-the-counter pain medicine, such as acetaminophen (Tylenol), ibuprofen (Advil, Motrin), or naproxen (Aleve). Be safe with medicines. Read and follow all instructions on the label. · Take your medicines exactly as prescribed. Call your doctor if you think you are having a problem with your medicine.   · Gentle stretching and massage may help you feel better after a few days of rest. Stretch slowly to the point just before discomfort begins, then hold the stretch for at least 15 to 30 seconds. Do this 3 or 4 times per day. · As your pain gets better, slowly return to your normal activities. Be patient, because chest bruises can take weeks or months to heal. Any increased pain may be a sign that you need to rest a while longer. When should you call for help? Call 911 anytime you think you may need emergency care. For example, call if:  ? · You have severe trouble breathing. ? · You cough up blood. ?Call your doctor now or seek immediate medical care if:  ? · You have belly pain. ? · You are dizzy or lightheaded, or you feel like you may faint. ? · You develop new symptoms with the chest pain. ? · Your chest pain gets worse. ? · You have a fever. ? · You have some shortness of breath. ? · You have a cough that brings up mucus from the lungs. ? Watch closely for changes in your health, and be sure to contact your doctor if:  ? · Your chest pain is not improving after 1 week. Where can you learn more? Go to http://ang-claribel.info/. Enter I174 in the search box to learn more about \"Chest Contusion: Care Instructions. \"  Current as of: March 20, 2017  Content Version: 11.4  © 2055-0368 [employer]. Care instructions adapted under license by [employer] (which disclaims liability or warranty for this information). If you have questions about a medical condition or this instruction, always ask your healthcare professional. [place] [address] any warranty or liability for your use of this information.

## 2017-11-15 NOTE — LETTER
[facility] EMERGENCY DEPT [address] [id number] [phone] Work/School Note Date: 11/15/2017 To Whom It May concern: 
 
Ruben Howard was seen and treated today in the emergency room by the following provider(s): 
Attending Provider: Fariba Hartman MD 
Nurse Practitioner: BONNY Silva. Ruben Howard was seen in the Emergency Department 11/15/2017.  
 
Sincerely, 
 
 
 
 
BONNY Silva

## 2017-11-15 NOTE — ED TRIAGE NOTES
Patient states five days ago fell off a friends porch. Patient states porch is approximately a foot and a half off the ground. No head injury or loc. Patient complaining of right flank and hip pain. Ambulatory to triage.

## 2017-11-15 NOTE — ED PROVIDER NOTES
HPI Comments: Patient presents with right rib and right flank pain after she fell off of a porch approx 5 days ago. She states she stepped on a loss board and fell approx 1-2 feet landing on her right side. She denies nausea,vomiting, hitting her head and LOC. Patient is a 49 y.o. female presenting with fall. The history is provided by the patient. Fall   The accident occurred more than 2 days ago. The fall occurred while standing. She fell from a height of 1 - 2 ft. She landed on grass. There was no blood loss. Pain location: right rib and right flank. The pain is at a severity of 9/10. The pain is mild. She was ambulatory at the scene. There was no entrapment after the fall. There was no drug use involved in the accident. There was no alcohol use involved in the accident. Pertinent negatives include no visual change, no fever, no numbness, no abdominal pain, no bowel incontinence, no nausea, no vomiting, no hematuria, no headaches, no extremity weakness, no hearing loss, no loss of consciousness, no tingling and no laceration. Past Medical History:   Diagnosis Date    Acute renal failure ([facility] 2010    COPD (chronic obstructive pulmonary disease) ([facility] 2010    Endocrine disease     hypothyroid    Hypertension     Other ill-defined conditions(799.89)     chronic back pain, migraines    Psychiatric disorder     anxiety    Seizure disorder ([facility] [address] [facility] 2010    Seizures ([facility]     Thrush 10/23/2010       Past Surgical History:   Procedure Laterality Date    HX APPENDECTOMY      HX CHOLECYSTECTOMY      HX HERNIA REPAIR  2017         Family History:   Problem Relation Age of Onset    Heart Disease Father     Heart Attack Father       age 46   Reema Fujita Seizures Mother        Social History     Social History    Marital status: SINGLE     Spouse name: N/A    Number of children: N/A    Years of education: N/A     Occupational History    Not on file.      Social History Main Topics    Smoking status: Current Every Day Smoker     Packs/day: 1.50     Years: 30.00     Types: Cigarettes    Smokeless tobacco: Never Used    Alcohol use No    Drug use: No      Comment: narcotics    Sexual activity: Not Currently     Other Topics Concern    Not on file     Social History Narrative    ** Merged History Encounter **         ** Data from: 10/20/10 Enc Dept: [facility] EMERGENCY DEPT         ** Data from: 9/16/10 Enc Dept: [facility] [address]    Lives with her boyfriend Carolyn Chu and her son         ALLERGIES: Contrast dye [iodine]; Pcn [penicillins]; Other medication; and Pcn [penicillins]    Review of Systems   Constitutional: Negative for chills and fever. Respiratory: Negative for cough and shortness of breath. Cardiovascular: Negative for chest pain. Gastrointestinal: Negative for abdominal pain, bowel incontinence, nausea and vomiting. Genitourinary: Positive for flank pain. Negative for difficulty urinating, dysuria, hematuria and pelvic pain. Musculoskeletal: Positive for arthralgias. Negative for extremity weakness. Skin: Negative for color change and rash. Neurological: Negative for dizziness, tingling, loss of consciousness, weakness, numbness and headaches. Vitals:    11/15/17 0843 11/15/17 1021   BP: 134/87 130/84   Pulse: 77 68   Resp: 16 18   Temp: 98 °F (36.7 °C) 97.9 °F (36.6 °C)   SpO2: 95% 96%   Weight: 90.7 kg (200 lb)    Height: 5' 9\" (1.753 m)             Physical Exam   Constitutional: She is oriented to person, place, and time. She appears well-developed and well-nourished. No distress. Cardiovascular: Normal rate and regular rhythm. No murmur heard. Pulmonary/Chest: Effort normal and breath sounds normal. No respiratory distress. She has no wheezes. She exhibits bony tenderness. She exhibits no crepitus, no edema and no deformity. Abdominal: Soft. Bowel sounds are normal. She exhibits no distension. There is no tenderness.    Musculoskeletal:        Lumbar back: She exhibits tenderness and pain. She exhibits no swelling and normal pulse. Back:    Neurological: She is alert and oriented to person, place, and time. Skin: Skin is warm and dry. No laceration and no rash noted. She is not diaphoretic. No erythema. Psychiatric: She has a normal mood and affect. Her behavior is normal.   Nursing note and vitals reviewed. Xr Ribs Rt Uni 2 V    Result Date: 11/15/2017  Right rib series HISTORY: Pain after fall 5-6 days ago. 4 views of the right ribs were obtained. No prior studies are available for comparison. FINDINGS: There is no evidence of acute fracture. There is no lytic or blastic lesion. The right lung is grossly clear. There are postoperative changes of the proximal right humerus. The cardiac silhouette is partially visualized but appears at least moderately enlarged. IMPRESSION: No evidence of acute right rib fracture. Xr Abd (ap And Erect Or Decub)    Result Date: 11/15/2017  Abdominal radiographs History: Right-sided pain after fall 6 days ago. AP views of the abdomen were obtained in the supine and upright position. Comparison: 07/14/2008 Findings: The intestinal gas pattern shows no evidence of obstruction. There is no free intraperitoneal air. No radiopaque densities resembling calculi are identified. The patient is status post cholecystectomy. Impression: Unremarkable abdominal radiographs. MDM  Number of Diagnoses or Management Options  Contusion of rib on right side, initial encounter:   Fall, initial encounter:   Diagnosis management comments: Xray of KUB and right rib negative for acute abnormality. Patient's UA negative for blood. Patient discharged with prescription for naproxen. Patient given IM toradol for pain prior to discharge.         Amount and/or Complexity of Data Reviewed  Clinical lab tests: ordered and reviewed  Tests in the radiology section of CPT®: ordered and reviewed  Tests in the medicine section of CPT®: ordered and reviewed    Patient Progress  Patient progress: stable    ED Course       Procedures

## 2019-03-19 ENCOUNTER — APPOINTMENT (OUTPATIENT)
Dept: GENERAL RADIOLOGY | Age: 49
End: 2019-03-19
Attending: EMERGENCY MEDICINE
Payer: MEDICARE

## 2019-03-19 ENCOUNTER — HOSPITAL ENCOUNTER (EMERGENCY)
Age: 49
Discharge: HOME | End: 2019-03-20
Payer: MEDICARE
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
## 2019-03-19 DIAGNOSIS — S82.831A OTHER CLOSED FRACTURE OF DISTAL END OF RIGHT FIBULA, INITIAL ENCOUNTER: Primary | ICD-10-CM

## 2019-03-19 PROCEDURE — 73610 X-RAY EXAM OF ANKLE: CPT

## 2019-03-19 PROCEDURE — 77030008298 HC SPLNT FBRGLS SCTCH 3M -A

## 2019-03-19 PROCEDURE — 99284 EMERGENCY DEPT VISIT MOD MDM: CPT

## 2019-03-19 PROCEDURE — 75810000053 HC SPLINT APPLICATION

## 2019-03-20 VITALS
OXYGEN SATURATION: 98 % | SYSTOLIC BLOOD PRESSURE: 114 MMHG | HEART RATE: 68 BPM | TEMPERATURE: 98 F | WEIGHT: 198 LBS | HEIGHT: 67 IN | RESPIRATION RATE: 16 BRPM | DIASTOLIC BLOOD PRESSURE: 82 MMHG | BODY MASS INDEX: 31.08 KG/M2

## 2019-03-20 RX ORDER — IBUPROFEN 400 MG/1
400 TABLET ORAL
Qty: 20 TAB | Refills: 0 | Status: SHIPPED | OUTPATIENT
Start: 2019-03-20 | End: 2021-02-25 | Stop reason: CLARIF
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
## 2019-03-20 NOTE — DISCHARGE INSTRUCTIONS
Patient Education        Broken Ankle: Care Instructions  Your Care Instructions    An ankle may break (fracture) during sports, a fall, or other accidents. Fractures can range from a small, hairline crack, to a bone or bones broken into two or more pieces. Your treatment depends on how bad the break is. Your doctor may have put your ankle in a splint or cast to allow it to heal or to keep it stable until you see another doctor. It may take weeks or months for your ankle to heal. You can help your ankle heal with some care at home. You heal best when you take good care of yourself. Eat a variety of healthy foods, and don't smoke. You may have had a sedative to help you relax. You may be unsteady after having sedation. It can take a few hours for the medicine's effects to wear off. Common side effects of sedation include nausea, vomiting, and feeling sleepy or tired. The doctor has checked you carefully, but problems can develop later. If you notice any problems or new symptoms,  get medical treatment right away. Follow-up care is a key part of your treatment and safety. Be sure to make and go to all appointments, and call your doctor if you are having problems. It's also a good idea to know your test results and keep a list of the medicines you take. How can you care for yourself at home? · If the doctor gave you a sedative:  ? For 24 hours, don't do anything that requires attention to detail. It takes time for the medicine's effects to completely wear off.  ? For your safety, do not drive or operate any machinery that could be dangerous. Wait until the medicine wears off and you can think clearly and react easily. · Put ice or a cold pack on your ankle for 10 to 20 minutes at a time. Try to do this every 1 to 2 hours for the next 3 days (when you are awake). Put a thin cloth between the ice and your cast or splint. Keep your cast or splint dry. · Follow the cast care instructions your doctor gives you.  If you have a splint, do not take it off unless your doctor tells you to. · Be safe with medicines. Take pain medicines exactly as directed. ? If the doctor gave you a prescription medicine for pain, take it as prescribed. ? If you are not taking a prescription pain medicine, ask your doctor if you can take an over-the-counter medicine. · Prop up your leg on pillows in the first few days after the injury. Keep the ankle higher than the level of your heart. This will help reduce swelling. · Do not put weight on your ankle unless your doctor tells you to. Use crutches to walk. · Follow instructions for exercises to keep your leg strong. · Wiggle your toes often to reduce swelling and stiffness. When should you call for help? Call 911 anytime you think you may need emergency care. For example, call if:    · You have chest pain, are short of breath, or you cough up blood.     · You are very sleepy and you have trouble waking up.    Call your doctor now or seek immediate medical care if:    · You have new or worse nausea or vomiting.     · You have new or worse pain.     · Your foot is cool or pale or changes color.     · You have tingling, weakness, or numbness in your toes.     · Your cast or splint feels too tight.     · You have signs of a blood clot in your leg (called a deep vein thrombosis), such as:  ? Pain in your calf, back of the knee, thigh, or groin. ? Redness or swelling in your leg.    Watch closely for changes in your health, and be sure to contact your doctor if:    · You have a problem with your splint or cast.     · You do not get better as expected. Where can you learn more? Go to http://ang-claribel.info/. Enter P763 in the search box to learn more about \"Broken Ankle: Care Instructions. \"  Current as of: September 20, 2018  Content Version: 11.9  © 7171-0935 [employer].  Care instructions adapted under license by [employer] (which disclaims liability or warranty for this information). If you have questions about a medical condition or this instruction, always ask your healthcare professional. [place] [address] any warranty or liability for your use of this information.
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
## 2019-03-20 NOTE — ED NOTES
Patient has been provided crutches with instructions. Patient has provided return demonstration. Patient verbalized understanding and able to walk with minimal difficulty.

## 2019-03-20 NOTE — ED NOTES
I have reviewed discharge instructions with the patient. The patient verbalized understanding. Patient left ED via Discharge Method: wheelchair to Home with transport from [facility]. The patient has been wheeled to [facility] via nurse and appears in no acute distress. The patient has been provided discharge instructions, prescription, and follow up information. The patient is awaiting transport via [facility] in wheelchair in [facility]. The patient does not have any questions at this time. Opportunity for questions and clarification provided. Patient given 1 scripts. To continue your aftercare when you leave the hospital, you may receive an automated call from our care team to check in on how you are doing. This is a free service and part of our promise to provide the best care and service to meet your aftercare needs.  If you have questions, or wish to unsubscribe from this service please call [phone]. Thank you for Choosing our [facility] Emergency Department.

## 2019-07-15 ENCOUNTER — HOSPITAL ENCOUNTER (OUTPATIENT)
Dept: GENERAL RADIOLOGY | Age: 49
Discharge: HOME | End: 2019-07-15
Payer: MEDICARE

## 2019-07-15 DIAGNOSIS — R52 PAIN: ICD-10-CM

## 2019-07-15 PROCEDURE — 72100 X-RAY EXAM L-S SPINE 2/3 VWS: CPT

## 2019-07-15 PROCEDURE — 72072 X-RAY EXAM THORAC SPINE 3VWS: CPT

## 2020-08-31 ENCOUNTER — HOSPITAL ENCOUNTER (EMERGENCY)
Age: 50
Discharge: HOME | End: 2020-08-31
Attending: EMERGENCY MEDICINE
Payer: MEDICARE

## 2020-08-31 ENCOUNTER — APPOINTMENT (OUTPATIENT)
Dept: GENERAL RADIOLOGY | Age: 50
End: 2020-08-31
Attending: EMERGENCY MEDICINE
Payer: MEDICARE

## 2020-08-31 VITALS
BODY MASS INDEX: 30.92 KG/M2 | DIASTOLIC BLOOD PRESSURE: 81 MMHG | TEMPERATURE: 99.1 F | HEART RATE: 60 BPM | SYSTOLIC BLOOD PRESSURE: 121 MMHG | WEIGHT: 197 LBS | HEIGHT: 67 IN | OXYGEN SATURATION: 97 % | RESPIRATION RATE: 18 BRPM

## 2020-08-31 DIAGNOSIS — M79.602 LEFT ARM PAIN: Primary | ICD-10-CM

## 2020-08-31 LAB
ALBUMIN SERPL-MCNC: 3.4 G/DL (ref 3.5–5)
ALBUMIN/GLOB SERPL: 0.9 {RATIO} (ref 1.2–3.5)
ALP SERPL-CCNC: 83 U/L (ref 50–136)
ALT SERPL-CCNC: 16 U/L (ref 12–65)
ANION GAP SERPL CALC-SCNC: 6 MMOL/L (ref 7–16)
AST SERPL-CCNC: 14 U/L (ref 15–37)
ATRIAL RATE: 63 BPM
BASOPHILS # BLD: 0.1 K/UL (ref 0–0.2)
BASOPHILS NFR BLD: 1 % (ref 0–2)
BILIRUB SERPL-MCNC: 0.2 MG/DL (ref 0.2–1.1)
BUN SERPL-MCNC: 11 MG/DL (ref 6–23)
CALCIUM SERPL-MCNC: 8.4 MG/DL (ref 8.3–10.4)
CALCULATED P AXIS, ECG09: 42 DEGREES
CALCULATED R AXIS, ECG10: 129 DEGREES
CALCULATED T AXIS, ECG11: 71 DEGREES
CHLORIDE SERPL-SCNC: 113 MMOL/L (ref 98–107)
CO2 SERPL-SCNC: 25 MMOL/L (ref 21–32)
CREAT SERPL-MCNC: 0.56 MG/DL (ref 0.6–1)
DIAGNOSIS, 93000: NORMAL
DIFFERENTIAL METHOD BLD: ABNORMAL
EOSINOPHIL # BLD: 0.2 K/UL (ref 0–0.8)
EOSINOPHIL NFR BLD: 2 % (ref 0.5–7.8)
ERYTHROCYTE [DISTWIDTH] IN BLOOD BY AUTOMATED COUNT: 13 % (ref 11.9–14.6)
GLOBULIN SER CALC-MCNC: 3.7 G/DL (ref 2.3–3.5)
GLUCOSE SERPL-MCNC: 68 MG/DL (ref 65–100)
HCT VFR BLD AUTO: 41.2 % (ref 35.8–46.3)
HGB BLD-MCNC: 13.5 G/DL (ref 11.7–15.4)
IMM GRANULOCYTES # BLD AUTO: 0 K/UL (ref 0–0.5)
IMM GRANULOCYTES NFR BLD AUTO: 0 % (ref 0–5)
LYMPHOCYTES # BLD: 3.9 K/UL (ref 0.5–4.6)
LYMPHOCYTES NFR BLD: 55 % (ref 13–44)
MCH RBC QN AUTO: 31 PG (ref 26.1–32.9)
MCHC RBC AUTO-ENTMCNC: 32.8 G/DL (ref 31.4–35)
MCV RBC AUTO: 94.5 FL (ref 79.6–97.8)
MONOCYTES # BLD: 0.7 K/UL (ref 0.1–1.3)
MONOCYTES NFR BLD: 10 % (ref 4–12)
NEUTS SEG # BLD: 2.2 K/UL (ref 1.7–8.2)
NEUTS SEG NFR BLD: 32 % (ref 43–78)
NRBC # BLD: 0 K/UL (ref 0–0.2)
P-R INTERVAL, ECG05: 158 MS
PLATELET # BLD AUTO: 211 K/UL (ref 150–450)
PMV BLD AUTO: 9.9 FL (ref 9.4–12.3)
POTASSIUM SERPL-SCNC: 3.8 MMOL/L (ref 3.5–5.1)
PROT SERPL-MCNC: 7.1 G/DL (ref 6.3–8.2)
Q-T INTERVAL, ECG07: 456 MS
QRS DURATION, ECG06: 116 MS
QTC CALCULATION (BEZET), ECG08: 466 MS
RBC # BLD AUTO: 4.36 M/UL (ref 4.05–5.2)
SODIUM SERPL-SCNC: 144 MMOL/L (ref 136–145)
TROPONIN-HIGH SENSITIVITY: 7.4 PG/ML (ref 0–14)
VENTRICULAR RATE, ECG03: 63 BPM
WBC # BLD AUTO: 7 K/UL (ref 4.3–11.1)

## 2020-08-31 PROCEDURE — 80053 COMPREHEN METABOLIC PANEL: CPT

## 2020-08-31 PROCEDURE — 74011250636 HC RX REV CODE- 250/636: Performed by: EMERGENCY MEDICINE

## 2020-08-31 PROCEDURE — 96374 THER/PROPH/DIAG INJ IV PUSH: CPT

## 2020-08-31 PROCEDURE — 99284 EMERGENCY DEPT VISIT MOD MDM: CPT

## 2020-08-31 PROCEDURE — 93005 ELECTROCARDIOGRAM TRACING: CPT | Performed by: EMERGENCY MEDICINE

## 2020-08-31 PROCEDURE — 85025 COMPLETE CBC W/AUTO DIFF WBC: CPT

## 2020-08-31 PROCEDURE — 71046 X-RAY EXAM CHEST 2 VIEWS: CPT

## 2020-08-31 PROCEDURE — 84484 ASSAY OF TROPONIN QUANT: CPT

## 2020-08-31 RX ORDER — KETOROLAC TROMETHAMINE 15 MG/ML
15 INJECTION, SOLUTION INTRAMUSCULAR; INTRAVENOUS
Status: COMPLETED | OUTPATIENT
Start: 2020-08-31 | End: 2020-08-31

## 2020-08-31 RX ADMIN — KETOROLAC TROMETHAMINE 15 MG: 15 INJECTION, SOLUTION INTRAMUSCULAR; INTRAVENOUS at 17:51

## 2020-08-31 NOTE — DISCHARGE INSTRUCTIONS
Ibuprofen 400 mg 4 times daily with food or Aleve 3 times daily with food  Blood pressure in our department is in acceptable range  Check with fever redness or swelling to arm

## 2020-08-31 NOTE — ED TRIAGE NOTES
Patient arrived via EMS from private home. EMS was called for left arm pain and hypertension. Patient was taking home BP with wrist BP cuff and BP was 149/102. BP with /68, HR 66bpm NSR with PVC, temp 98.1, , o2 sat 99% RA. A+O x4. EMS placed IV 20g left hand. EMS gave ASA 324mg.

## 2020-08-31 NOTE — ED NOTES
I have reviewed discharge instructions with the patient. The patient verbalized understanding. Patient left ED via Discharge Method: ambulatory to Home with self. Opportunity for questions and clarification provided. Patient given 0 scripts. To continue your aftercare when you leave the hospital, you may receive an automated call from our care team to check in on how you are doing. This is a free service and part of our promise to provide the best care and service to meet your aftercare needs.  If you have questions, or wish to unsubscribe from this service please call [phone]. Thank you for Choosing our [facility] Emergency Department.

## 2020-09-02 NOTE — ED PROVIDER NOTES
Here with left arm pain. No injury. Worse to move/no swelling. No redness or rash. No angina/ chest pain. No SOB. No history of clots. Had /101 and felt needed to come to get checked. Otherwise is OK    The history is provided by the patient. Arm Pain    This is a new problem. The current episode started more than 2 days ago. The problem occurs constantly. The pain is present in the left arm. Hypertension    Pertinent negatives include no chest pain, no palpitations and no shortness of breath.         Past Medical History:   Diagnosis Date    Acute renal failure ([facility] 2010    COPD (chronic obstructive pulmonary disease) ([facility] 2010    Endocrine disease     hypothyroid    Hypertension     Other ill-defined conditions(799.89)     chronic back pain, migraines    Psychiatric disorder     anxiety    Seizure disorder ([facility] 2010    [facility]     Thrush 10/23/2010       Past Surgical History:   Procedure Laterality Date    HX APPENDECTOMY      HX CHOLECYSTECTOMY      HX HERNIA REPAIR           Family History:   Problem Relation Age of Onset    Heart Disease Father     Heart Attack Father          age 46   Noreene Peat Seizures Mother        Social History     Socioeconomic History    Marital status: SINGLE     Spouse name: Not on file    Number of children: Not on file    Years of education: Not on file    Highest education level: Not on file   Occupational History    Not on file   Social Needs    Financial resource strain: Not on file    Food insecurity     Worry: Not on file     Inability: Not on file    Transportation needs     Medical: Not on file     Non-medical: Not on file   Tobacco Use    Smoking status: Current Every Day Smoker     Packs/day: 1.50     Years: 30.00     Pack years: 45.00     Types: Cigarettes    Smokeless tobacco: Never Used   Substance and Sexual Activity    Alcohol use: No    Drug use: No     Comment: narcotics    Sexual activity: Not Currently Lifestyle    Physical activity     Days per week: Not on file     Minutes per session: Not on file    Stress: Not on file   Relationships    Social connections     Talks on phone: Not on file     Gets together: Not on file     Attends Christian service: Not on file     Active member of club or organization: Not on file     Attends meetings of clubs or organizations: Not on file     Relationship status: Not on file    Intimate partner violence     Fear of current or ex partner: Not on file     Emotionally abused: Not on file     Physically abused: Not on file     Forced sexual activity: Not on file   Other Topics Concern    Not on file   Social History Narrative    ** Merged History Encounter **         ** Data from: 10/20/10 Enc Dept: [facility] EMERGENCY DEPT         ** Data from: 9/16/10 Enc Dept: [facility] [id number]    Lives with her boyfriend Shanti Camacho and her son         ALLERGIES: Contrast dye [iodine]; Pcn [penicillins]; Other medication; and Pcn [penicillins]    Review of Systems   Constitutional: Negative. Negative for chills and fever. HENT: Negative. Respiratory: Negative. Negative for cough, shortness of breath and wheezing. Cardiovascular: Negative for chest pain, palpitations and leg swelling. Musculoskeletal: Positive for arthralgias. All other systems reviewed and are negative. Vitals:    08/31/20 1519 08/31/20 1657 08/31/20 1753   BP: 105/74 112/65 121/81   Pulse: 60     Resp: 18     Temp: 99.1 °F (37.3 °C)     SpO2: 95% 94% 97%   Weight: 89.4 kg (197 lb)     Height: 5' 7\" (1.702 m)              Physical Exam  Vitals signs and nursing note reviewed. Constitutional:       General: She is not in acute distress. Appearance: She is well-developed. HENT:      Head: Atraumatic. Eyes:      General: No scleral icterus. Neck:      Musculoskeletal: Neck supple. Cardiovascular:      Rate and Rhythm: Normal rate and regular rhythm. Pulses: Normal pulses.       Heart sounds: Normal heart sounds. Pulmonary:      Effort: Pulmonary effort is normal. No respiratory distress. Breath sounds: Normal breath sounds. Abdominal:      Palpations: Abdomen is soft. Skin:     General: Skin is warm and dry. Neurological:      Mental Status: Mental status is at baseline. Psychiatric:         Thought Content: Thought content normal.          MDM  Number of Diagnoses or Management Options  Left arm pain:   Diagnosis management comments: No swelling with excellent pulses and no findings of injury / clot/rash / infection. No cervical disc changes.  All areas of complaint sore to palpation and move       Amount and/or Complexity of Data Reviewed  Clinical lab tests: reviewed  Review and summarize past medical records: yes    Risk of Complications, Morbidity, and/or Mortality  Presenting problems: moderate  Diagnostic procedures: low  Management options: moderate    Patient Progress  Patient progress: stable         Procedures

## 2021-01-17 ENCOUNTER — HOSPITAL ENCOUNTER (EMERGENCY)
Age: 51
Discharge: HOME | End: 2021-01-17
Attending: EMERGENCY MEDICINE
Payer: MEDICARE

## 2021-01-17 ENCOUNTER — APPOINTMENT (OUTPATIENT)
Dept: GENERAL RADIOLOGY | Age: 51
End: 2021-01-17
Attending: EMERGENCY MEDICINE
Payer: MEDICARE

## 2021-01-17 VITALS
TEMPERATURE: 97.7 F | OXYGEN SATURATION: 97 % | RESPIRATION RATE: 16 BRPM | HEART RATE: 74 BPM | SYSTOLIC BLOOD PRESSURE: 114 MMHG | DIASTOLIC BLOOD PRESSURE: 84 MMHG | WEIGHT: 163 LBS | BODY MASS INDEX: 24.71 KG/M2 | HEIGHT: 68 IN

## 2021-01-17 DIAGNOSIS — S63.502A SPRAIN OF LEFT WRIST, INITIAL ENCOUNTER: Primary | ICD-10-CM

## 2021-01-17 PROCEDURE — 73110 X-RAY EXAM OF WRIST: CPT

## 2021-01-17 PROCEDURE — 99282 EMERGENCY DEPT VISIT SF MDM: CPT

## 2021-01-17 NOTE — ED PROVIDER NOTES
29-year-old female presents with left wrist pain. She reports chronic pain in that wrist, but pain worsened after she fell 2 weeks ago with an outstretched hand. She was not seen for x-rays after the fall. Pain has persisted despite wearing a wrist brace. No numbness or tingling. Arm Pain   Pertinent negatives include no numbness.         Past Medical History:   Diagnosis Date    [facility]    COPD (chronic obstructive pulmonary disease) ([facility] 2010    Endocrine disease     hypothyroid    Hypertension     Other ill-defined conditions(799.89)     chronic back pain, migraines    Psychiatric disorder     anxiety    [facility] 2010    Seizures ([facility]     Thrush 10/23/2010       Past Surgical History:   Procedure Laterality Date    HX APPENDECTOMY      HX CHOLECYSTECTOMY      HX HERNIA REPAIR           Family History:   Problem Relation Age of Onset    Heart Disease Father     Heart Attack Father          age 46   [place] Seizures Mother        Social History     Socioeconomic History    Marital status: SINGLE     Spouse name: Not on file    Number of children: Not on file    Years of education: Not on file    Highest education level: Not on file   Occupational History    Not on file   Social Needs    Financial resource strain: Not on file    Food insecurity     Worry: Not on file     Inability: Not on file    Transportation needs     Medical: Not on file     Non-medical: Not on file   Tobacco Use    Smoking status: Current Every Day Smoker     Packs/day: 1.50     Years: 30.00     Pack years: 45.00     Types: Cigarettes    Smokeless tobacco: Never Used   Substance and Sexual Activity    Alcohol use: No    Drug use: No     Comment: narcotics    Sexual activity: Not Currently   Lifestyle    Physical activity     Days per week: Not on file     Minutes per session: Not on file    Stress: Not on file   Relationships    Social connections Talks on phone: Not on file     Gets together: Not on file     Attends Sabianist service: Not on file     Active member of club or organization: Not on file     Attends meetings of clubs or organizations: Not on file     Relationship status: Not on file    Intimate partner violence     Fear of current or ex partner: Not on file     Emotionally abused: Not on file     Physically abused: Not on file     Forced sexual activity: Not on file   Other Topics Concern    Not on file   Social History Narrative    ** Merged History Encounter **         ** Data from: 10/20/10 Enc Dept: [facility] EMERGENCY DEPT         ** Data from: 9/16/10 Enc Dept: [facility] [address]    Lives with her boyfriend [facility] and her son         ALLERGIES: Contrast dye [iodine], Pcn [penicillins], Other medication, and Pcn [penicillins]    Review of Systems   Constitutional: Negative for fever. Musculoskeletal: Positive for arthralgias. Negative for joint swelling. Skin: Negative for wound. Neurological: Negative for weakness and numbness. Vitals:    01/17/21 1551   BP: 114/84   Pulse: 74   Resp: 16   Temp: 97.7 °F (36.5 °C)   SpO2: 97%   Weight: 73.9 kg (163 lb)   Height: 5' 8\" (1.727 m)            Physical Exam  Vitals signs and nursing note reviewed. HENT:      Head: Normocephalic and atraumatic. Eyes:      Pupils: Pupils are equal, round, and reactive to light. Musculoskeletal:      Left wrist: She exhibits tenderness and deformity. She exhibits normal range of motion and no swelling. Arms:    Skin:     General: Skin is dry. Findings: No erythema. Neurological:      Mental Status: She is alert. Psychiatric:         Mood and Affect: Mood normal.          MDM  Number of Diagnoses or Management Options  Diagnosis management comments: Parts of this document were created using dragon voice recognition software. The chart has been reviewed but errors may still be present.   I wore appropriate PPE throughout this patient's ED visit. Adriane Parisi MD, 4:11 PM    4:25 PM  No obvious fracture. Given ortho referral for persistent pain and possible MRI.        Amount and/or Complexity of Data Reviewed  Tests in the radiology section of CPT®: ordered and reviewed           Procedures

## 2021-01-17 NOTE — DISCHARGE INSTRUCTIONS
Follow-up with orthopedics for further evaluation of ongoing pain and possible MRI. Return for worsening or concerning symptoms. Continue anti-inflammatories for pain.

## 2021-01-17 NOTE — ED TRIAGE NOTES
Patient arrives ambulatory to triage with mask in place. Patient reports left wrist pain. Reports fell a couple of weeks ago and pain has not gotten better. Presents in brace. Able to move fingers without difficulty.

## 2021-01-17 NOTE — ED NOTES
I have reviewed discharge instructions with the patient. The patient verbalized understanding. Patient left ED via Discharge Method: ambulatory to Home with slef. Opportunity for questions and clarification provided. Patient given 0 scripts. To continue your aftercare when you leave the hospital, you may receive an automated call from our care team to check in on how you are doing. This is a free service and part of our promise to provide the best care and service to meet your aftercare needs.  If you have questions, or wish to unsubscribe from this service please call [phone]. Thank you for Choosing our [facility] Emergency Department.

## 2021-02-28 ENCOUNTER — ANESTHESIA EVENT (OUTPATIENT)
Dept: SURGERY | Age: 51
End: 2021-02-28
Payer: MEDICARE

## 2021-03-01 ENCOUNTER — ANESTHESIA (OUTPATIENT)
Dept: SURGERY | Age: 51
End: 2021-03-01
Payer: MEDICARE

## 2021-03-01 ENCOUNTER — HOSPITAL ENCOUNTER (OUTPATIENT)
Age: 51
Setting detail: OUTPATIENT SURGERY
Discharge: HOME | End: 2021-03-01
Attending: ORTHOPAEDIC SURGERY | Admitting: ORTHOPAEDIC SURGERY
Payer: MEDICARE

## 2021-03-01 VITALS
HEART RATE: 62 BPM | SYSTOLIC BLOOD PRESSURE: 112 MMHG | WEIGHT: 157 LBS | TEMPERATURE: 97.8 F | OXYGEN SATURATION: 95 % | BODY MASS INDEX: 24.59 KG/M2 | DIASTOLIC BLOOD PRESSURE: 78 MMHG | RESPIRATION RATE: 14 BRPM

## 2021-03-01 DIAGNOSIS — M65.4 DE QUERVAIN'S SYNDROME (TENOSYNOVITIS): Primary | ICD-10-CM

## 2021-03-01 PROCEDURE — 76210000063 HC OR PH I REC FIRST 0.5 HR: Performed by: ORTHOPAEDIC SURGERY

## 2021-03-01 PROCEDURE — 74011250636 HC RX REV CODE- 250/636: Performed by: NURSE PRACTITIONER

## 2021-03-01 PROCEDURE — 77030040356 HC CORD BPLR FRCP COVD -A: Performed by: ORTHOPAEDIC SURGERY

## 2021-03-01 PROCEDURE — 74011000250 HC RX REV CODE- 250: Performed by: ORTHOPAEDIC SURGERY

## 2021-03-01 PROCEDURE — 74011000250 HC RX REV CODE- 250: Performed by: NURSE ANESTHETIST, CERTIFIED REGISTERED

## 2021-03-01 PROCEDURE — 74011250636 HC RX REV CODE- 250/636: Performed by: ANESTHESIOLOGY

## 2021-03-01 PROCEDURE — 76060000031 HC ANESTHESIA FIRST 0.5 HR: Performed by: ORTHOPAEDIC SURGERY

## 2021-03-01 PROCEDURE — 74011250636 HC RX REV CODE- 250/636: Performed by: NURSE ANESTHETIST, CERTIFIED REGISTERED

## 2021-03-01 PROCEDURE — 77030010507 HC ADH SKN DERMBND J&J -B: Performed by: ORTHOPAEDIC SURGERY

## 2021-03-01 PROCEDURE — 77030003028 HC SUT VCRL J&J -A: Performed by: ORTHOPAEDIC SURGERY

## 2021-03-01 PROCEDURE — 2709999900 HC NON-CHARGEABLE SUPPLY: Performed by: ORTHOPAEDIC SURGERY

## 2021-03-01 PROCEDURE — 74011250637 HC RX REV CODE- 250/637: Performed by: ANESTHESIOLOGY

## 2021-03-01 PROCEDURE — 76010000154 HC OR TIME FIRST 0.5 HR: Performed by: ORTHOPAEDIC SURGERY

## 2021-03-01 PROCEDURE — 77030000032 HC CUF TRNQT ZIMM -B: Performed by: ORTHOPAEDIC SURGERY

## 2021-03-01 PROCEDURE — 76210000020 HC REC RM PH II FIRST 0.5 HR: Performed by: ORTHOPAEDIC SURGERY

## 2021-03-01 RX ORDER — MIDAZOLAM HYDROCHLORIDE 1 MG/ML
2 INJECTION, SOLUTION INTRAMUSCULAR; INTRAVENOUS ONCE
Status: COMPLETED | OUTPATIENT
Start: 2021-03-01 | End: 2021-03-01

## 2021-03-01 RX ORDER — CEFAZOLIN SODIUM/WATER 2 G/20 ML
2 SYRINGE (ML) INTRAVENOUS ONCE
Status: COMPLETED | OUTPATIENT
Start: 2021-03-01 | End: 2021-03-01

## 2021-03-01 RX ORDER — LIDOCAINE HYDROCHLORIDE 10 MG/ML
0.1 INJECTION INFILTRATION; PERINEURAL AS NEEDED
Status: DISCONTINUED | OUTPATIENT
Start: 2021-03-01 | End: 2021-03-01 | Stop reason: HOSPADM

## 2021-03-01 RX ORDER — SODIUM CHLORIDE, SODIUM LACTATE, POTASSIUM CHLORIDE, CALCIUM CHLORIDE 600; 310; 30; 20 MG/100ML; MG/100ML; MG/100ML; MG/100ML
75 INJECTION, SOLUTION INTRAVENOUS CONTINUOUS
Status: DISCONTINUED | OUTPATIENT
Start: 2021-03-01 | End: 2021-03-01 | Stop reason: HOSPADM

## 2021-03-01 RX ORDER — HYDROCODONE BITARTRATE AND ACETAMINOPHEN 5; 325 MG/1; MG/1
1 TABLET ORAL
Qty: 28 TAB | Refills: 0 | Status: SHIPPED | OUTPATIENT
Start: 2021-03-01 | End: 2021-03-08

## 2021-03-01 RX ORDER — OXYCODONE HYDROCHLORIDE 5 MG/1
10 TABLET ORAL
Status: DISCONTINUED | OUTPATIENT
Start: 2021-03-01 | End: 2021-03-01 | Stop reason: HOSPADM

## 2021-03-01 RX ORDER — HYDROMORPHONE HYDROCHLORIDE 2 MG/ML
0.5 INJECTION, SOLUTION INTRAMUSCULAR; INTRAVENOUS; SUBCUTANEOUS
Status: DISCONTINUED | OUTPATIENT
Start: 2021-03-01 | End: 2021-03-01 | Stop reason: HOSPADM

## 2021-03-01 RX ORDER — BUPIVACAINE HYDROCHLORIDE 2.5 MG/ML
INJECTION, SOLUTION EPIDURAL; INFILTRATION; INTRACAUDAL AS NEEDED
Status: DISCONTINUED | OUTPATIENT
Start: 2021-03-01 | End: 2021-03-01 | Stop reason: HOSPADM

## 2021-03-01 RX ORDER — SODIUM CHLORIDE 0.9 % (FLUSH) 0.9 %
5-40 SYRINGE (ML) INJECTION AS NEEDED
Status: DISCONTINUED | OUTPATIENT
Start: 2021-03-01 | End: 2021-03-01 | Stop reason: HOSPADM

## 2021-03-01 RX ORDER — PROPOFOL 10 MG/ML
INJECTION, EMULSION INTRAVENOUS
Status: DISCONTINUED | OUTPATIENT
Start: 2021-03-01 | End: 2021-03-01 | Stop reason: HOSPADM

## 2021-03-01 RX ORDER — LIDOCAINE HYDROCHLORIDE 10 MG/ML
INJECTION INFILTRATION; PERINEURAL AS NEEDED
Status: DISCONTINUED | OUTPATIENT
Start: 2021-03-01 | End: 2021-03-01 | Stop reason: HOSPADM

## 2021-03-01 RX ORDER — FENTANYL CITRATE 50 UG/ML
100 INJECTION, SOLUTION INTRAMUSCULAR; INTRAVENOUS ONCE
Status: DISCONTINUED | OUTPATIENT
Start: 2021-03-01 | End: 2021-03-01 | Stop reason: HOSPADM

## 2021-03-01 RX ORDER — TRAMADOL HYDROCHLORIDE 50 MG/1
50 TABLET ORAL
Qty: 28 TAB | Refills: 0 | Status: SHIPPED | OUTPATIENT
Start: 2021-03-01 | End: 2021-03-01

## 2021-03-01 RX ORDER — PROPOFOL 10 MG/ML
INJECTION, EMULSION INTRAVENOUS AS NEEDED
Status: DISCONTINUED | OUTPATIENT
Start: 2021-03-01 | End: 2021-03-01 | Stop reason: HOSPADM

## 2021-03-01 RX ORDER — LIDOCAINE HYDROCHLORIDE 20 MG/ML
INJECTION, SOLUTION EPIDURAL; INFILTRATION; INTRACAUDAL; PERINEURAL AS NEEDED
Status: DISCONTINUED | OUTPATIENT
Start: 2021-03-01 | End: 2021-03-01 | Stop reason: HOSPADM

## 2021-03-01 RX ORDER — FAMOTIDINE 20 MG/1
20 TABLET, FILM COATED ORAL ONCE
Status: COMPLETED | OUTPATIENT
Start: 2021-03-01 | End: 2021-03-01

## 2021-03-01 RX ORDER — SODIUM CHLORIDE 0.9 % (FLUSH) 0.9 %
5-40 SYRINGE (ML) INJECTION EVERY 8 HOURS
Status: DISCONTINUED | OUTPATIENT
Start: 2021-03-01 | End: 2021-03-01 | Stop reason: HOSPADM

## 2021-03-01 RX ORDER — MIDAZOLAM HYDROCHLORIDE 1 MG/ML
2 INJECTION, SOLUTION INTRAMUSCULAR; INTRAVENOUS ONCE
Status: DISCONTINUED | OUTPATIENT
Start: 2021-03-01 | End: 2021-03-01 | Stop reason: HOSPADM

## 2021-03-01 RX ORDER — OXYCODONE HYDROCHLORIDE 5 MG/1
5 TABLET ORAL
Status: DISCONTINUED | OUTPATIENT
Start: 2021-03-01 | End: 2021-03-01 | Stop reason: HOSPADM

## 2021-03-01 RX ADMIN — PROPOFOL 40 MG: 10 INJECTION, EMULSION INTRAVENOUS at 08:28

## 2021-03-01 RX ADMIN — LIDOCAINE HYDROCHLORIDE 60 MG: 20 INJECTION, SOLUTION EPIDURAL; INFILTRATION; INTRACAUDAL; PERINEURAL at 08:28

## 2021-03-01 RX ADMIN — PROPOFOL 140 MCG/KG/MIN: 10 INJECTION, EMULSION INTRAVENOUS at 08:28

## 2021-03-01 RX ADMIN — FAMOTIDINE 20 MG: 20 TABLET, FILM COATED ORAL at 07:47

## 2021-03-01 RX ADMIN — MIDAZOLAM 2 MG: 1 INJECTION INTRAMUSCULAR; INTRAVENOUS at 08:13

## 2021-03-01 RX ADMIN — SODIUM CHLORIDE, SODIUM LACTATE, POTASSIUM CHLORIDE, AND CALCIUM CHLORIDE 75 ML/HR: 600; 310; 30; 20 INJECTION, SOLUTION INTRAVENOUS at 07:48

## 2021-03-01 RX ADMIN — CEFAZOLIN 2 G: 1 INJECTION, POWDER, FOR SOLUTION INTRAVENOUS at 08:24

## 2021-03-01 NOTE — ANESTHESIA POSTPROCEDURE EVALUATION
Procedure(s):  LEFT DEQUERVAINS RELEASE.    total IV anesthesia    Anesthesia Post Evaluation      Multimodal analgesia: multimodal analgesia not used between 6 hours prior to anesthesia start to PACU discharge  Patient location during evaluation: PACU  Patient participation: complete - patient participated  Level of consciousness: awake and alert  Pain management: adequate  Airway patency: patent  Anesthetic complications: no  Cardiovascular status: hemodynamically stable  Respiratory status: acceptable  Hydration status: acceptable        INITIAL Post-op Vital signs:   Vitals Value Taken Time   /75 03/01/21 0907   Temp 36.6 °C (97.8 °F) 03/01/21 0854   Pulse 63 03/01/21 0911   Resp 14 03/01/21 0907   SpO2 97 % 03/01/21 0911   Vitals shown include unvalidated device data.

## 2021-03-01 NOTE — H&P
H&P Update:  Brittani Bean was seen and examined. History and physical has been reviewed. The patient has been examined.  There have been no significant clinical changes since the completion of the originally dated History and Stephanie Cat MD, PhD  Orthopaedic Surgery  03/01/21  7:34 AM

## 2021-03-01 NOTE — OP NOTES
Hand Surgery Operative Note      Chandni Oquendo   45 y.o.   female      Pre-op diagnosis: Left DeQuervain Tenosynovitis   Post op diagnosis: same      Procedure: Left First Dorsal Extensor Compartment Release, Extensive Tenosynovectomy (cpt 58046, 81016)     Surgeon: Radha Willis MD, PhD      Anesthesia: Council block      Tourniquet time:   Total Tourniquet Time Documented:  Forearm (Left) - 12 minutes  Total: Forearm (Left) - 12 minutes        Procedure indications: Patient with recalcitrant radial sided pain consistent with DeQuervain stenosing synovitis. Conservative treatment including HEP, NSAIDs, braces and steroid injections have been tried and failed to provide long-lasting relief of pain. After Thorough discussion, the patient decided to proceed with surgical management. We discussed in detail surgical risks including scar, pain, bleeding, infection, anesthetic risks, neurovascular injury, need for further surgery,  weakness, stiffness, risk of death and potential risk of other unforseen complication. Procedure description:      Description of procedure: Patient was placed in the supine position and after appropriate time-out and side, site and procedure confirmed. An oblique incision was made over the first dorsal extensor compartment, blunt dissection was use, the dorsal radial sensory nerve was identified and protected, the 1st dorsal compartment was identified and release of the EPB and APL tendon sheath retinacular tissue was done on the dorsal third of the compartment to avoid volar dislocation of the tendons afterwards. The tendons demonstrated significant thickened synovium and fraying, this was thoroughly debrided sharply with scissors until the tendons exhibited a smooth gliding surface without fraying or subluxation. Local anesthetic with . 25% sensorcaine with epi was injected in the wound edges.   Wound irrigated and closure in routine fashion with 4-0 rapide suture, glue and steristrips. Sterile dressing was applied     Disposition: To PACU with no complications and follow up per routine. Patient is instructed to remove dressings in five days and other precautions include avoidance of heavy and repetitive lifting for 2 weeks, when an appointment for follow up and suture removal will take place.      Lynette Simon MD  03/01/21  8:49 AM

## 2021-03-01 NOTE — ANESTHESIA PREPROCEDURE EVALUATION
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 Cardiovascular    Hypertension: well controlled        Dysrhythmias       Exercise tolerance: >4 METS     GI/Hepatic/Renal                Endo/Other             Other Findings              Physical Exam    Airway  Mallampati: II  TM Distance: 4 - 6 cm  Neck ROM: normal range of motion   Mouth opening: Normal     Cardiovascular    Rhythm: regular  Rate: normal         Dental    Dentition: Full upper dentures and Full lower dentures     Pulmonary    Rhonchi:bilateral    Wheezes:bilateral         Abdominal  GI exam deferred       Other Findings            Anesthetic Plan    ASA: 3  Anesthesia type: total IV anesthesia          Induction: Intravenous  Anesthetic plan and risks discussed with: Patient      Covid 3 months ago

## 2021-03-01 NOTE — DISCHARGE INSTRUCTIONS
Postoperative  Instructions:      Weightbearing or Lifting:  Limit  weight  lifting  to  less  than  1  pound  (coffee  mug)  for  the  first  2  weeks  after  surgery. Dressing  instructions:    Keep  your  dressing  and/or  splint  clean  and  dry  at  all  times. You  can  remove  your  dressing  on  post-operative  day  #5  and  change  with  a  dry/sterile  dressing  or  Band-Aids  as  needed  thereafter. Showering  Instructions:  May  shower  But keep surgical dressing clean and dry until removed as explained above. After dressing is removed, you may allow soapy water to run through the incision during showers but do not scrub. After each shower, pat dry and apply a dry dressing. Do  not  soak  your  Incision in still water or bathtub  for  3  weeks  after  surgery. If  the  incision  gets  wet otherwise,  pat  dry  and  do  not  scrub  the  incision. Do  not  apply  cream  or  lotion  to  incision      Pain  Control:  - You  have  been  given  a  prescription  to  be  taken  as  directed  for  post-operative  pain  control. In  addition,  elevate  the  operative  extremity  above  the  heart  at  all  times  to  prevent  swelling  and  throbbing  pain. - If you develop constipation while taking narcotic pain medications (Norco, Hydrocodone, Percocet, Oxycodone, Dilaudid, Hydromorphone) take  over-the-counter  Colace,  100mg  by  mouth  twice  a  Day. - Nausea  is  a  common  side  effect  of  many  pain  medications. You  will  want  to  eat something  before  taking  your  pain  medicine  to  help  prevent  Nausea. - If  you  are  taking  a  prescription  pain  medication  that  contains  acetaminophen,  we  recommend  that  you  do  not  take  additional  over  the  counter  acetaminophen  (Tylenol®).       Other  pain  relieving  options:   - Using  a  cold  pack  to  ice  the  affected  area  a  few  times  a  day  (15  to  20  minutes  at  a  time)  can  help  to  relieve pain,  reduce  swelling  and  bruising.      - Elevation  of  the  affected  area  can  also  help  to  reduce  pain  and  swelling. Did  you  receive  a  nerve  Block? A  nerve  block  can  provide  pain  relief  for  one  hour  to  two  days  after  your  surgery. As  long  as  the  nerve  block  is  working,  you  will  experience  little  or  no  sensation  in  the  area  the  surgeon  operated  on. As  the  nerve  block  wears  off,  you  will  begin  to  experience  pain  or  discomfort. It  is  very  important  that  you  begin  taking  your  prescribed  pain  medication  before  the  nerve  block  fully  wears  off. The first sign that the nerve block is wearing off is tingling in your fingers. Treating  your  pain  at  the  first  sign  of  the  block  wearing  off  will  ensure  your  pain  is  better  controlled  and  more  tolerable  when  full-sensation  returns. Do  not  wait  until  the  pain  is  intolerable,  as  the  medicine  will  be  less  effective. It  is  better  to  treat  pain  in  advance  than  to  try  and  catch  up. General  Anesthesia or Sedation:      If  you  did  not  receive  a  nerve  block  during  your  surgery,  you  will  need  to  start  taking  your  pain  medication  shortly  after  your  surgery  and  should  continue  to  do  so  as  prescribed  by  your  surgeon. Please  call  [phone]  with any concern and ask to speak with Yves Ochoa. Concerning problems include:      -  Excessive  redness  of  the  incisions      -  Drainage  for  more  than  2  Days after surgery or any foul smelling drainage  -  Fever  of  more  than  101.5  F      Please  call  [phone]  if  you  do  not  receive  or  are  unsure  of  your  first  follow-up  appointment. You  should  see  the  doctor  10-14  days  after  your  Surgery. Thank you for choosing me and [address] [place] [address] for your care.  I will go above and beyond to ensure you receive the best care possible. Jonathan Correia MD, PhD      Martínez Moss Call your doctor if pain is NOT relieved by medication.   Excessive bleeding that does not stop after holding pressure over the area  · Temperature of 101 degrees F or above  · Excessive redness, swelling or bruising, and/ or green or yellow, smelly discharge from incision      TYPICAL SIDE EFFECTS OF PAIN MEDICATION:     Constipation: Drink lots of fluids. Over the counter stool softener if needed.    Nausea: Take pain medication with food. Call your doctor with persistent nausea. ACTIVITY  · As tolerated and as directed by your doctor. · Bathe or shower as directed by your doctor. DIET  · Day of surgery: Clear liquids until no nausea or vomiting; small portion, light diet [place] foods (ex: baked chicken, plain rice, grits, scrambled eggs, toast). Nothing greasy, fried or spicy today. · Advance to regular diet on second day, unless your doctor orders otherwise. · If nausea and vomiting continues, call your doctor. PAIN  · Take pain medication as directed by your doctor. · DO NOT take aspirin or blood thinners unless directed by your doctor. AFTER ANESTHESIA   · For the first 24 hours: DO NOT Drive, Drink alcoholic beverages, or Make important decisions. · Be aware of dizziness following anesthesia and while taking pain medication. DISCHARGE SUMMARY from Nurse    PATIENT INSTRUCTIONS:    After general anesthesia or intravenous sedation, for 24 hours or while taking prescription Narcotics:  · Limit your activities  · Do not drive and operate hazardous machinery  · Do not make important personal or business decisions  · Do  not drink alcoholic beverages  · If you have not urinated within 8 hours after discharge, please contact your surgeon on call. *  Please give a list of your current medications to your Primary Care Provider.     *  Please update this list whenever your medications are discontinued, doses are      changed, or new medications (including over-the-counter products) are added. *  Please carry medication information at all times in case of emergency situations. Preventing Infection at Home  We care about preventing infection and avoiding the spread of germs - not only when you are in the hospital but also when you return home. When you return home from the hospital, its important to take the following steps to help prevent infection and avoid spreading germs that could infect you and others. Ask everyone in your home to follow these guidelines, too. Clean Your Hands  · Clean your hands whenever your hands are visibly dirty, before you eat, before or after touching your mouth, nose or eyes, and before preparing food. Clean them after contact with body fluids, using the restroom, touching animals or changing diapers. · When washing hands, wet them with warm water and work up a lather. Rub hands for at least 15 seconds, then rinse them and pat them dry with a clean towel or paper towel. · When using hand sanitizers, it should take about 15 seconds to rub your hands dry. If not, you probably didnt apply enough . Cover Your Sneeze or Cough  Germs are released into the air whenever you sneeze or cough. To prevent the spread of infection:  · Turn away from other people before coughing or sneezing. · Cover your mouth or nose with a tissue when you cough or sneeze. Put the tissue in the trash. · If you dont have a tissue, cough or sneeze into your upper sleeve, not your hands. · Always clean your hands after coughing or sneezing. Care for Wounds  Your skin is your bodys first line of defense against germs, but an open wound leaves an easy way for germs to enter your body. To prevent infection:  · Clean your hands before and after changing wound dressings, and wear gloves to change dressings if recommended by your doctor.   · Take special care with IV lines or other devices inserted into the body. If you must touch them, clean your hands first.  · Follow any specific instructions from your doctor to care for your wounds. Contact your doctor if you experience any signs of infection, such as fever or increased redness at the surgical or wound site.    Keep a Clean Home  · Clean or wipe commonly touched hard surfaces like door handles, sinks, tabletops, phones and TV remotes.  · Use products labeled “disinfectant” to kill harmful bacteria and viruses.  · Use a clean cloth or paper towel to clean and dry surfaces. Wiping surfaces with a dirty dishcloth, sponge or towel will only spread germs.  · Never share toothbrushes, hamilton, drinking glasses, utensils, razor blades, face cloths or bath towels to avoid spreading germs.  · Be sure that the linens that you sleep on are clean.  · Keep pets away from wounds and wash your hands after touching pets, their toys or bedding.    We care about you and your health. Remember, preventing infections is a team effort between you, your family, friends and health care providers.       These are general instructions for a healthy lifestyle:    No smoking/ No tobacco products/ Avoid exposure to second hand smoke    Surgeon General's Warning:  Quitting smoking now greatly reduces serious risk to your health.    Obesity, smoking, and sedentary lifestyle greatly increases your risk for illness    A healthy diet, regular physical exercise & weight monitoring are important for maintaining a healthy lifestyle    You may be retaining fluid if you have a history of heart failure or if you experience any of the following symptoms:  Weight gain of 3 pounds or more overnight or 5 pounds in a week, increased swelling in our hands or feet or shortness of breath while lying flat in bed.  Please call your doctor as soon as you notice any of these symptoms; do not wait until your next office visit.    Recognize signs and symptoms of STROKE:    F-face looks  uneven    A-arms unable to move or move unevenly    S-speech slurred or non-existent    T-time-call 911 as soon as signs and symptoms begin-DO NOT go       Back to bed or wait to see if you get better-TIME IS BRAIN. Learning About Coronavirus [phone])  Coronavirus [phone]): Overview  What is coronavirus (COVID-19)? The coronavirus disease (COVID-19) is caused by a virus. It is an illness that was first found in [place], [place], in December 2019. It has since spread worldwide. The virus can cause fever, cough, and trouble breathing. In severe cases, it can cause pneumonia and make it hard to breathe without help. It can cause death. Coronaviruses are a large group of viruses. They cause the common cold. They also cause more serious illnesses like [place] East respiratory syndrome (MERS) and severe acute respiratory syndrome (SARS). COVID-19 is caused by a novel coronavirus. That means it's a new type that has not been seen in people before. This virus spreads person-to-person through droplets from coughing and sneezing. It can also spread when you are close to someone who is infected. And it can spread when you touch something that has the virus on it, such as a doorknob or a tabletop. What can you do to protect yourself from coronavirus (COVID-19)? The best way to protect yourself from getting sick is to:  · Avoid areas where there is an outbreak. · Avoid contact with people who may be infected. · Wash your hands often with soap or alcohol-based hand sanitizers. · Avoid crowds and try to stay at least 6 feet away from other people. · Wash your hands often, especially after you cough or sneeze. Use soap and water, and scrub for at least 20 seconds. If soap and water aren't available, use an alcohol-based hand . · Avoid touching your mouth, nose, and eyes. What can you do to avoid spreading the virus to others?   To help avoid spreading the virus to others:  · Cover your mouth with a tissue when you cough or sneeze. Then throw the tissue in the trash. · Use a disinfectant to clean things that you touch often. · Wear a cloth face cover if you have to go to public areas. · Stay home if you are sick or have been exposed to the virus. Don't go to school, work, or public areas. And don't use public transportation, ride-shares, or taxis unless you have no choice. · If you are sick:  ? Leave your home only if you need to get medical care. But call the doctor's office first so they know you're coming. And wear a face cover. ? Wear the face cover whenever you're around other people. It can help stop the spread of the virus when you cough or sneeze. ? Clean and disinfect your home every day. Use household  and disinfectant wipes or sprays. Take special care to clean things that you grab with your hands. These include doorknobs, remote controls, phones, and handles on your refrigerator and microwave. And don't forget countertops, tabletops, bathrooms, and computer keyboards. When to call for help  [id number] anytime you think you may need emergency care. For example, call if:  · You have severe trouble breathing. (You can't talk at all.)  · You have constant chest pain or pressure. · You are severely dizzy or lightheaded. · You are confused or can't think clearly. · Your face and lips have a blue color. · You pass out (lose consciousness) or are very hard to wake up. Call your doctor now if you develop symptoms such as:  · Shortness of breath. · Fever. · Cough. If you need to get care, call ahead to the doctor's office for instructions before you go. Make sure you wear a face cover to prevent exposing other people to the virus. Where can you get the latest information? The following health organizations are tracking and studying this virus. Their websites contain the most up-to-date information. Jessica Saliva also learn what to do if you think you may have been exposed to the virus. · [place]  [facility] for Disease Control and Prevention (CDC): The CDC provides updated news about the disease and travel advice. The website also tells you how to prevent the spread of infection. www.cdc.gov  · World Health Organization [facility]): WHO offers information about the virus outbreaks. WHO also has travel advice. www.who.int  Current as of: May 8, 2020               Content Version: 12.5  © 2006-2020 Healthwise, [employer]. Care instructions adapted under license by [employer] (which disclaims liability or warranty for this information). If you have questions about a medical condition or this instruction, always ask your healthcare professional. [address] any warranty or liability for your use of this information.

## 2021-03-06 PROBLEM — M65.4 DE QUERVAIN'S TENOSYNOVITIS, LEFT: Status: ACTIVE | Noted: 2021-03-06

## 2021-05-21 ENCOUNTER — TRANSCRIBE ORDER (OUTPATIENT)
Dept: SCHEDULING | Age: 51
End: 2021-05-21

## 2021-05-21 DIAGNOSIS — Z12.31 ENCOUNTER FOR SCREENING MAMMOGRAM FOR MALIGNANT NEOPLASM OF BREAST: Primary | ICD-10-CM

## 2022-03-18 PROBLEM — J18.9 CAP (COMMUNITY ACQUIRED PNEUMONIA): Status: ACTIVE | Noted: 2017-02-12

## 2022-03-18 PROBLEM — K43.0 INCARCERATED INCISIONAL HERNIA: Status: ACTIVE | Noted: 2017-02-08

## 2022-03-19 PROBLEM — M65.4 DE QUERVAIN'S TENOSYNOVITIS, LEFT: Status: ACTIVE | Noted: 2021-03-06

## 2022-10-15 ENCOUNTER — HOSPITAL ENCOUNTER (EMERGENCY)
Dept: GENERAL RADIOLOGY | Age: 52
Discharge: HOME | DRG: 190 | End: 2022-10-18
Payer: MEDICARE

## 2022-10-15 LAB
BASOPHILS # BLD: 0.1 K/UL (ref 0–0.2)
BASOPHILS NFR BLD: 1 % (ref 0–2)
DIFFERENTIAL METHOD BLD: ABNORMAL
EOSINOPHIL # BLD: 0.3 K/UL (ref 0–0.8)
EOSINOPHIL NFR BLD: 3 % (ref 0.5–7.8)
ERYTHROCYTE [DISTWIDTH] IN BLOOD BY AUTOMATED COUNT: 12.9 % (ref 11.9–14.6)
HCT VFR BLD AUTO: 41.9 % (ref 35.8–46.3)
HGB BLD-MCNC: 13.5 G/DL (ref 11.7–15.4)
IMM GRANULOCYTES # BLD AUTO: 0 K/UL (ref 0–0.5)
IMM GRANULOCYTES NFR BLD AUTO: 0 % (ref 0–5)
LYMPHOCYTES # BLD: 3.1 K/UL (ref 0.5–4.6)
LYMPHOCYTES NFR BLD: 38 % (ref 13–44)
MCH RBC QN AUTO: 31.3 PG (ref 26.1–32.9)
MCHC RBC AUTO-ENTMCNC: 32.2 G/DL (ref 31.4–35)
MCV RBC AUTO: 97 FL (ref 82–102)
MONOCYTES # BLD: 1.2 K/UL (ref 0.1–1.3)
MONOCYTES NFR BLD: 14 % (ref 4–12)
NEUTS SEG # BLD: 3.5 K/UL (ref 1.7–8.2)
NEUTS SEG NFR BLD: 44 % (ref 43–78)
NRBC # BLD: 0 K/UL (ref 0–0.2)
PLATELET # BLD AUTO: 228 K/UL (ref 150–450)
PMV BLD AUTO: 9.4 FL (ref 9.4–12.3)
RBC # BLD AUTO: 4.32 M/UL (ref 4.05–5.2)
WBC # BLD AUTO: 8.1 K/UL (ref 4.3–11.1)

## 2022-10-15 PROCEDURE — 83735 ASSAY OF MAGNESIUM: CPT

## 2022-10-15 PROCEDURE — 85025 COMPLETE CBC W/AUTO DIFF WBC: CPT

## 2022-10-15 PROCEDURE — 84484 ASSAY OF TROPONIN QUANT: CPT

## 2022-10-15 PROCEDURE — 80053 COMPREHEN METABOLIC PANEL: CPT

## 2022-10-15 PROCEDURE — 99285 EMERGENCY DEPT VISIT HI MDM: CPT | Performed by: EMERGENCY MEDICINE

## 2022-10-15 PROCEDURE — 93005 ELECTROCARDIOGRAM TRACING: CPT | Performed by: EMERGENCY MEDICINE

## 2022-10-15 PROCEDURE — 94664 DEMO&/EVAL PT USE INHALER: CPT

## 2022-10-15 PROCEDURE — 71045 X-RAY EXAM CHEST 1 VIEW: CPT

## 2022-10-15 RX ORDER — IPRATROPIUM BROMIDE AND ALBUTEROL SULFATE 2.5; .5 MG/3ML; MG/3ML
1 SOLUTION RESPIRATORY (INHALATION)
Status: COMPLETED | OUTPATIENT
Start: 2022-10-15 | End: 2022-10-16

## 2022-10-15 ASSESSMENT — PAIN SCALES - GENERAL: PAINLEVEL_OUTOF10: 9

## 2022-10-15 ASSESSMENT — PAIN - FUNCTIONAL ASSESSMENT: PAIN_FUNCTIONAL_ASSESSMENT: 0-10

## 2022-10-16 ENCOUNTER — HOSPITAL ENCOUNTER (INPATIENT)
Age: 52
LOS: 1 days | Discharge: HOME | DRG: 190 | End: 2022-10-17
Attending: EMERGENCY MEDICINE | Admitting: FAMILY MEDICINE
Payer: MEDICARE

## 2022-10-16 DIAGNOSIS — J44.1 COPD EXACERBATION (HCC): Primary | ICD-10-CM

## 2022-10-16 DIAGNOSIS — J44.1 COPD WITH ACUTE EXACERBATION (HCC): ICD-10-CM

## 2022-10-16 PROBLEM — J18.9 CAP (COMMUNITY ACQUIRED PNEUMONIA): Status: RESOLVED | Noted: 2017-02-12 | Resolved: 2022-10-16

## 2022-10-16 PROBLEM — J18.9 CAP (COMMUNITY ACQUIRED PNEUMONIA): Status: ACTIVE | Noted: 2017-02-12

## 2022-10-16 LAB
ALBUMIN SERPL-MCNC: 3.5 G/DL (ref 3.5–5)
ALBUMIN/GLOB SERPL: 1 {RATIO} (ref 0.4–1.6)
ALP SERPL-CCNC: 111 U/L (ref 50–136)
ALT SERPL-CCNC: 17 U/L (ref 12–65)
ANION GAP SERPL CALC-SCNC: 2 MMOL/L (ref 2–11)
AST SERPL-CCNC: 8 U/L (ref 15–37)
BILIRUB SERPL-MCNC: 0.2 MG/DL (ref 0.2–1.1)
BUN SERPL-MCNC: 16 MG/DL (ref 6–23)
CALCIUM SERPL-MCNC: 9 MG/DL (ref 8.3–10.4)
CHLORIDE SERPL-SCNC: 107 MMOL/L (ref 101–110)
CO2 SERPL-SCNC: 31 MMOL/L (ref 21–32)
CREAT SERPL-MCNC: 0.6 MG/DL (ref 0.6–1)
EKG ATRIAL RATE: 70 BPM
EKG DIAGNOSIS: NORMAL
EKG P AXIS: 30 DEGREES
EKG P-R INTERVAL: 156 MS
EKG Q-T INTERVAL: 426 MS
EKG QRS DURATION: 110 MS
EKG QTC CALCULATION (BAZETT): 460 MS
EKG R AXIS: 135 DEGREES
EKG T AXIS: 102 DEGREES
EKG VENTRICULAR RATE: 70 BPM
GLOBULIN SER CALC-MCNC: 3.6 G/DL (ref 2.8–4.5)
GLUCOSE SERPL-MCNC: 105 MG/DL (ref 65–100)
MAGNESIUM SERPL-MCNC: 1.9 MG/DL (ref 1.8–2.4)
POTASSIUM SERPL-SCNC: 4 MMOL/L (ref 3.5–5.1)
PROT SERPL-MCNC: 7.1 G/DL (ref 6.3–8.2)
SARS-COV-2 RDRP RESP QL NAA+PROBE: NOT DETECTED
SODIUM SERPL-SCNC: 140 MMOL/L (ref 133–143)
SOURCE: NORMAL
TROPONIN I SERPL HS-MCNC: 7.6 PG/ML (ref 0–14)

## 2022-10-16 PROCEDURE — 6370000000 HC RX 637 (ALT 250 FOR IP): Performed by: EMERGENCY MEDICINE

## 2022-10-16 PROCEDURE — 1100000000 HC RM PRIVATE

## 2022-10-16 PROCEDURE — 6360000002 HC RX W HCPCS: Performed by: FAMILY MEDICINE

## 2022-10-16 PROCEDURE — 6360000002 HC RX W HCPCS: Performed by: NURSE PRACTITIONER

## 2022-10-16 PROCEDURE — 6370000000 HC RX 637 (ALT 250 FOR IP): Performed by: FAMILY MEDICINE

## 2022-10-16 PROCEDURE — 94761 N-INVAS EAR/PLS OXIMETRY MLT: CPT

## 2022-10-16 PROCEDURE — 87635 SARS-COV-2 COVID-19 AMP PRB: CPT

## 2022-10-16 PROCEDURE — 2580000003 HC RX 258: Performed by: FAMILY MEDICINE

## 2022-10-16 PROCEDURE — 94640 AIRWAY INHALATION TREATMENT: CPT

## 2022-10-16 PROCEDURE — 94760 N-INVAS EAR/PLS OXIMETRY 1: CPT

## 2022-10-16 PROCEDURE — 2580000003 HC RX 258: Performed by: NURSE PRACTITIONER

## 2022-10-16 PROCEDURE — 6370000000 HC RX 637 (ALT 250 FOR IP): Performed by: INTERNAL MEDICINE

## 2022-10-16 PROCEDURE — 2700000000 HC OXYGEN THERAPY PER DAY

## 2022-10-16 RX ORDER — LANOLIN ALCOHOL/MO/W.PET/CERES
6 CREAM (GRAM) TOPICAL ONCE
Status: COMPLETED | OUTPATIENT
Start: 2022-10-16 | End: 2022-10-16

## 2022-10-16 RX ORDER — SODIUM CHLORIDE 9 MG/ML
INJECTION, SOLUTION INTRAVENOUS PRN
Status: DISCONTINUED | OUTPATIENT
Start: 2022-10-16 | End: 2022-10-17 | Stop reason: HOSPADM

## 2022-10-16 RX ORDER — AZITHROMYCIN 250 MG/1
500 TABLET, FILM COATED ORAL DAILY
Status: DISCONTINUED | OUTPATIENT
Start: 2022-10-16 | End: 2022-10-17 | Stop reason: HOSPADM

## 2022-10-16 RX ORDER — IPRATROPIUM BROMIDE AND ALBUTEROL SULFATE 2.5; .5 MG/3ML; MG/3ML
1 SOLUTION RESPIRATORY (INHALATION)
Status: DISCONTINUED | OUTPATIENT
Start: 2022-10-16 | End: 2022-10-17 | Stop reason: HOSPADM

## 2022-10-16 RX ORDER — DIVALPROEX SODIUM 250 MG/1
500 TABLET, EXTENDED RELEASE ORAL 2 TIMES DAILY
Status: DISCONTINUED | OUTPATIENT
Start: 2022-10-16 | End: 2022-10-17 | Stop reason: HOSPADM

## 2022-10-16 RX ORDER — ACETAMINOPHEN 325 MG/1
650 TABLET ORAL EVERY 6 HOURS PRN
Status: DISCONTINUED | OUTPATIENT
Start: 2022-10-16 | End: 2022-10-17 | Stop reason: HOSPADM

## 2022-10-16 RX ORDER — ONDANSETRON 2 MG/ML
4 INJECTION INTRAMUSCULAR; INTRAVENOUS EVERY 6 HOURS PRN
Status: DISCONTINUED | OUTPATIENT
Start: 2022-10-16 | End: 2022-10-17 | Stop reason: HOSPADM

## 2022-10-16 RX ORDER — SODIUM CHLORIDE, SODIUM LACTATE, POTASSIUM CHLORIDE, AND CALCIUM CHLORIDE .6; .31; .03; .02 G/100ML; G/100ML; G/100ML; G/100ML
500 INJECTION, SOLUTION INTRAVENOUS ONCE
Status: COMPLETED | OUTPATIENT
Start: 2022-10-16 | End: 2022-10-16

## 2022-10-16 RX ORDER — ONDANSETRON 4 MG/1
4 TABLET, ORALLY DISINTEGRATING ORAL EVERY 8 HOURS PRN
Status: DISCONTINUED | OUTPATIENT
Start: 2022-10-16 | End: 2022-10-17 | Stop reason: HOSPADM

## 2022-10-16 RX ORDER — CLONAZEPAM 0.5 MG/1
1 TABLET ORAL EVERY 4 HOURS PRN
Status: DISCONTINUED | OUTPATIENT
Start: 2022-10-16 | End: 2022-10-17 | Stop reason: HOSPADM

## 2022-10-16 RX ORDER — GABAPENTIN 100 MG/1
100 CAPSULE ORAL 3 TIMES DAILY
Status: DISCONTINUED | OUTPATIENT
Start: 2022-10-16 | End: 2022-10-16 | Stop reason: SDUPTHER

## 2022-10-16 RX ORDER — METHYLPREDNISOLONE SODIUM SUCCINATE 125 MG/2ML
40 INJECTION, POWDER, LYOPHILIZED, FOR SOLUTION INTRAMUSCULAR; INTRAVENOUS EVERY 6 HOURS
Status: DISCONTINUED | OUTPATIENT
Start: 2022-10-16 | End: 2022-10-17 | Stop reason: HOSPADM

## 2022-10-16 RX ORDER — SODIUM CHLORIDE 0.9 % (FLUSH) 0.9 %
5-40 SYRINGE (ML) INJECTION EVERY 12 HOURS SCHEDULED
Status: DISCONTINUED | OUTPATIENT
Start: 2022-10-16 | End: 2022-10-17 | Stop reason: HOSPADM

## 2022-10-16 RX ORDER — PREDNISONE 20 MG/1
40 TABLET ORAL DAILY
Status: DISCONTINUED | OUTPATIENT
Start: 2022-10-18 | End: 2022-10-17 | Stop reason: HOSPADM

## 2022-10-16 RX ORDER — ACETAMINOPHEN 650 MG/1
650 SUPPOSITORY RECTAL EVERY 6 HOURS PRN
Status: DISCONTINUED | OUTPATIENT
Start: 2022-10-16 | End: 2022-10-17 | Stop reason: HOSPADM

## 2022-10-16 RX ORDER — KETOROLAC TROMETHAMINE 30 MG/ML
30 INJECTION, SOLUTION INTRAMUSCULAR; INTRAVENOUS ONCE
Status: COMPLETED | OUTPATIENT
Start: 2022-10-16 | End: 2022-10-16

## 2022-10-16 RX ORDER — PROCHLORPERAZINE EDISYLATE 5 MG/ML
10 INJECTION INTRAMUSCULAR; INTRAVENOUS ONCE
Status: COMPLETED | OUTPATIENT
Start: 2022-10-16 | End: 2022-10-16

## 2022-10-16 RX ORDER — GABAPENTIN 400 MG/1
800 CAPSULE ORAL 2 TIMES DAILY
Status: DISCONTINUED | OUTPATIENT
Start: 2022-10-16 | End: 2022-10-17 | Stop reason: HOSPADM

## 2022-10-16 RX ORDER — CARVEDILOL 12.5 MG/1
12.5 TABLET ORAL EVERY EVENING
Status: DISCONTINUED | OUTPATIENT
Start: 2022-10-16 | End: 2022-10-17 | Stop reason: HOSPADM

## 2022-10-16 RX ORDER — GABAPENTIN 400 MG/1
800 CAPSULE ORAL 2 TIMES DAILY
Status: CANCELLED | OUTPATIENT
Start: 2022-10-16

## 2022-10-16 RX ORDER — PHENYTOIN SODIUM 100 MG/1
300 CAPSULE, EXTENDED RELEASE ORAL 2 TIMES DAILY
Status: DISCONTINUED | OUTPATIENT
Start: 2022-10-16 | End: 2022-10-17 | Stop reason: HOSPADM

## 2022-10-16 RX ORDER — ENOXAPARIN SODIUM 100 MG/ML
40 INJECTION SUBCUTANEOUS DAILY
Status: DISCONTINUED | OUTPATIENT
Start: 2022-10-16 | End: 2022-10-17 | Stop reason: HOSPADM

## 2022-10-16 RX ORDER — ALBUTEROL SULFATE 90 UG/1
2 AEROSOL, METERED RESPIRATORY (INHALATION) EVERY 6 HOURS PRN
COMMUNITY

## 2022-10-16 RX ORDER — TRAZODONE HYDROCHLORIDE 50 MG/1
100 TABLET ORAL NIGHTLY
Status: DISCONTINUED | OUTPATIENT
Start: 2022-10-16 | End: 2022-10-17 | Stop reason: HOSPADM

## 2022-10-16 RX ORDER — SODIUM CHLORIDE 0.9 % (FLUSH) 0.9 %
5-40 SYRINGE (ML) INJECTION PRN
Status: DISCONTINUED | OUTPATIENT
Start: 2022-10-16 | End: 2022-10-17 | Stop reason: HOSPADM

## 2022-10-16 RX ORDER — POLYETHYLENE GLYCOL 3350 17 G/17G
17 POWDER, FOR SOLUTION ORAL DAILY PRN
Status: DISCONTINUED | OUTPATIENT
Start: 2022-10-16 | End: 2022-10-17 | Stop reason: HOSPADM

## 2022-10-16 RX ADMIN — SODIUM CHLORIDE, PRESERVATIVE FREE 10 ML: 5 INJECTION INTRAVENOUS at 10:00

## 2022-10-16 RX ADMIN — IPRATROPIUM BROMIDE AND ALBUTEROL SULFATE 1 AMPULE: .5; 3 SOLUTION RESPIRATORY (INHALATION) at 01:21

## 2022-10-16 RX ADMIN — DIVALPROEX SODIUM 500 MG: 250 TABLET, EXTENDED RELEASE ORAL at 21:15

## 2022-10-16 RX ADMIN — PROCHLORPERAZINE EDISYLATE 10 MG: 5 INJECTION INTRAMUSCULAR; INTRAVENOUS at 09:52

## 2022-10-16 RX ADMIN — PHENYTOIN SODIUM 300 MG: 100 CAPSULE ORAL at 21:15

## 2022-10-16 RX ADMIN — METHYLPREDNISOLONE SODIUM SUCCINATE 40 MG: 125 INJECTION, POWDER, FOR SOLUTION INTRAMUSCULAR; INTRAVENOUS at 09:53

## 2022-10-16 RX ADMIN — IPRATROPIUM BROMIDE AND ALBUTEROL SULFATE 1 AMPULE: .5; 3 SOLUTION RESPIRATORY (INHALATION) at 16:51

## 2022-10-16 RX ADMIN — CARVEDILOL 12.5 MG: 12.5 TABLET, FILM COATED ORAL at 17:33

## 2022-10-16 RX ADMIN — GABAPENTIN 800 MG: 400 CAPSULE ORAL at 09:53

## 2022-10-16 RX ADMIN — IPRATROPIUM BROMIDE AND ALBUTEROL SULFATE 1 AMPULE: .5; 3 SOLUTION RESPIRATORY (INHALATION) at 12:43

## 2022-10-16 RX ADMIN — IPRATROPIUM BROMIDE AND ALBUTEROL SULFATE 1 AMPULE: .5; 3 SOLUTION RESPIRATORY (INHALATION) at 20:55

## 2022-10-16 RX ADMIN — GABAPENTIN 800 MG: 400 CAPSULE ORAL at 21:15

## 2022-10-16 RX ADMIN — METHYLPREDNISOLONE SODIUM SUCCINATE 40 MG: 125 INJECTION, POWDER, FOR SOLUTION INTRAMUSCULAR; INTRAVENOUS at 21:15

## 2022-10-16 RX ADMIN — SODIUM CHLORIDE, POTASSIUM CHLORIDE, SODIUM LACTATE AND CALCIUM CHLORIDE 500 ML: 600; 310; 30; 20 INJECTION, SOLUTION INTRAVENOUS at 09:55

## 2022-10-16 RX ADMIN — DIVALPROEX SODIUM 500 MG: 250 TABLET, EXTENDED RELEASE ORAL at 10:23

## 2022-10-16 RX ADMIN — IPRATROPIUM BROMIDE AND ALBUTEROL SULFATE 1 AMPULE: .5; 3 SOLUTION RESPIRATORY (INHALATION) at 08:16

## 2022-10-16 RX ADMIN — ENOXAPARIN SODIUM 40 MG: 100 INJECTION SUBCUTANEOUS at 09:53

## 2022-10-16 RX ADMIN — TRAZODONE HYDROCHLORIDE 100 MG: 50 TABLET ORAL at 21:15

## 2022-10-16 RX ADMIN — Medication 6 MG: at 21:58

## 2022-10-16 RX ADMIN — CLONAZEPAM 1 MG: 0.5 TABLET ORAL at 17:40

## 2022-10-16 RX ADMIN — SODIUM CHLORIDE, PRESERVATIVE FREE 10 ML: 5 INJECTION INTRAVENOUS at 21:19

## 2022-10-16 RX ADMIN — METHYLPREDNISOLONE SODIUM SUCCINATE 40 MG: 125 INJECTION, POWDER, FOR SOLUTION INTRAMUSCULAR; INTRAVENOUS at 17:33

## 2022-10-16 RX ADMIN — PHENYTOIN SODIUM 300 MG: 100 CAPSULE ORAL at 10:22

## 2022-10-16 RX ADMIN — AZITHROMYCIN MONOHYDRATE 500 MG: 250 TABLET ORAL at 09:53

## 2022-10-16 RX ADMIN — CLONAZEPAM 1 MG: 0.5 TABLET ORAL at 21:58

## 2022-10-16 RX ADMIN — KETOROLAC TROMETHAMINE 30 MG: 30 INJECTION, SOLUTION INTRAMUSCULAR at 09:52

## 2022-10-16 ASSESSMENT — ENCOUNTER SYMPTOMS
VOMITING: 0
SINUS PRESSURE: 0
RHINORRHEA: 1
BACK PAIN: 0
ABDOMINAL PAIN: 0
DIARRHEA: 0
WHEEZING: 1
SHORTNESS OF BREATH: 1
SORE THROAT: 0
COUGH: 1
NAUSEA: 0

## 2022-10-16 ASSESSMENT — PAIN SCALES - GENERAL
PAINLEVEL_OUTOF10: 10
PAINLEVEL_OUTOF10: 0
PAINLEVEL_OUTOF10: 0

## 2022-10-16 ASSESSMENT — PAIN DESCRIPTION - LOCATION: LOCATION: HEAD

## 2022-10-16 NOTE — PROGRESS NOTES
47year old CF PMH COPD, HTN, seizure disorder, tobacco use admitted after midnight for COPD exacerbation with acute respiratory failure with hypoxia. Currently sats 99% on 3 liters. On steroids, Duoneb tx. Azithromycin. I have seen the patient and reviewed the chart. I agree with current plan. Patient does endorse migraine H/A this am. States h/o same. Compazine, Toradol, LR bolus ordered. She is already on steroids. No charge for visit.

## 2022-10-16 NOTE — ED NOTES
TRANSFER - OUT REPORT:    Verbal report given to [address]  on Lori President  being transferred to [address] [place] [address] for routine progression of patient care       Report consisted of patient's Situation, Background, Assessment and   Recommendations(SBAR). Information from the following report(s) ED SBAR was reviewed with the receiving nurse. Lines:   Peripheral IV 10/16/22 Left Antecubital (Active)   Phlebitis Assessment No symptoms 10/16/22 0625   Infiltration Assessment 0 10/16/22 0625   Alcohol Cap Used No 10/16/22 5522        Opportunity for questions and clarification was provided.       Patient transported with:  Eli Laura, CORTNEY  10/16/22 0813

## 2022-10-16 NOTE — PROGRESS NOTES
This primary RN counted/verified with Dima Aragon RN 74 (count) gabapentin 800 mg tablets and placed in lock box.

## 2022-10-16 NOTE — ED PROVIDER NOTES
[employer] Emergency Department Provider Note                   PCP:                None Provider               Age: 47 y.o. Sex: female       Aleksander Pham is a 47 y.o. female who presents to the Emergency Department with chief complaint of    Chief Complaint   Patient presents with    Shortness of Breath      Patient states that 4 days ago she started having cough, nasal congestion, shortness of breath, and wheezing. Patient was seen at her doctor's office and tested negative for COVID. Patient is already on albuterol inhaler and was put on allergy medication without any relief. Patient states she cannot tolerate steroids. Patient states that she has some muscle aches but no fever. The history is provided by the patient. All other systems reviewed and are negative. Review of Systems   Constitutional:  Negative for chills, fatigue and fever. HENT:  Positive for congestion and rhinorrhea. Negative for sinus pressure and sore throat. Eyes:  Negative for visual disturbance. Respiratory:  Positive for cough, shortness of breath and wheezing. Cardiovascular:  Negative for chest pain and leg swelling. Gastrointestinal:  Negative for abdominal pain, diarrhea, nausea and vomiting. Genitourinary:  Negative for decreased urine volume and dysuria. Musculoskeletal:  Negative for back pain, myalgias and neck pain. Skin:  Negative for rash. Neurological:  Negative for dizziness and headaches. Psychiatric/Behavioral:  Negative for confusion.       Past Medical History:   Diagnosis Date    Acute renal failure ([address] 9/12/2010    Cancer ([address]     cervical    COPD (chronic obstructive pulmonary disease) ([address] 9/16/2010    Endocrine disease     hypothyroid    Hypertension     managed with medicaitons    Other ill-defined conditions(799.89)     chronic back pain, migraines    Psychiatric disorder     anxiety    Seizure disorder ([address] 9/12/2010    Seizures ([address]     Aleshia Mayberry 10/23/2010        Past Surgical History:   Procedure Laterality Date    APPENDECTOMY      CHOLECYSTECTOMY      DILATION AND CURETTAGE OF UTERUS      HERNIA REPAIR  2017    HYSTERECTOMY          Family History   Problem Relation Age of Onset    Heart Attack Father          age 46    Heart Disease Father     Seizures Mother         Social History     Socioeconomic History    Marital status: Single   Tobacco Use    Smoking status: Every Day     Packs/day: 1.00     Types: Cigarettes    Smokeless tobacco: Never   Substance and Sexual Activity    Alcohol use: No    Drug use: No   Social History Narrative    ** Data from: 9/16/10 Enc Dept: [facility] 3 INTENSIVE CARE         ** Merged History Encounter **    Lives with her boyfriend Candi Vidal and her son         ** Data from: 10/20/10 Enc Dept: D EMERGENCY DEPT        Allergies: Iodine, Penicillins, Meloxicam, and Tramadol    Previous Medications    CARVEDILOL (COREG) 12.5 MG TABLET    Take 12.5 mg by mouth every evening    CLONAZEPAM (KLONOPIN) 1 MG TABLET    Take by mouth every 4 hours. DIVALPROEX (DEPAKOTE ER) 250 MG EXTENDED RELEASE TABLET    Take 250 mg by mouth 2 times daily    GABAPENTIN (NEURONTIN) 100 MG CAPSULE    Take by mouth 3 times daily. IPRATROPIUM-ALBUTEROL (DUONEB) 0.5-2.5 (3) MG/3ML SOLN NEBULIZER SOLUTION    Inhale 3 mLs into the lungs every 6 hours as needed    PHENYTOIN (DILANTIN) 100 MG ER CAPSULE    Take 300 mg by mouth 2 times daily    TRAZODONE (DESYREL) 100 MG TABLET    Take 100 mg by mouth        Vitals signs and nursing note reviewed.    Patient Vitals for the past 4 hrs:   Temp Pulse BP SpO2   10/16/22 0411 -- -- 112/84 92 %   10/16/22 0355 -- -- 113/80 96 %   10/16/22 0347 -- -- 112/70 98 %   10/16/22 0329 -- -- 109/71 94 %   10/16/22 0314 -- -- 100/70 96 %   10/16/22 0300 -- -- -- 94 %   10/16/22 0257 -- -- 114/66 --   10/16/22 0247 -- -- 101/69 --   10/16/22 023 -- -- -- (!) 89 %   10/16/22 0237 -- -- -- (!) 83 %   10/16/22 0227 -- -- 110/82 97 %   10/16/22 0225 -- -- -- 96 %   10/16/22 0224 -- -- -- 96 %   10/16/22 0219 -- -- -- 95 %   10/16/22 0217 -- -- -- 97 %   10/16/22 0214 -- -- -- 96 %   10/16/22 0213 -- -- 110/82 96 %   10/16/22 0210 -- -- -- 96 %   10/16/22 0209 -- -- -- 96 %   10/16/22 0207 -- -- -- 96 %   10/16/22 0204 -- -- -- 96 %   10/16/22 0159 -- -- -- 95 %   10/16/22 0158 -- -- 119/77 96 %   10/16/22 0157 -- -- -- 96 %   10/16/22 0155 -- -- -- 96 %   10/16/22 0154 -- -- -- 96 %   10/16/22 0149 -- -- -- (!) 86 %   10/16/22 0147 -- -- -- (!) 86 %   10/16/22 0145 -- -- -- 94 %   10/16/22 0144 -- -- -- (!) 87 %   10/16/22 0143 -- -- 123/80 (!) 86 %   10/16/22 0140 -- -- -- (!) 87 %   10/16/22 0139 -- -- -- (!) 89 %   10/16/22 0137 -- -- -- (!) 87 %   10/16/22 0134 -- -- -- 92 %   10/16/22 0131 98.6 °F (37 °C) 70 -- 91 %   10/16/22 0129 -- -- -- 90 %   10/16/22 0128 -- -- 117/81 --   10/16/22 0122 -- -- -- 93 %          Physical Exam  Vitals and nursing note reviewed. Constitutional:       Appearance: Normal appearance. HENT:      Head: Normocephalic and atraumatic. Cardiovascular:      Rate and Rhythm: Normal rate and regular rhythm. Pulses: Normal pulses. Heart sounds: Normal heart sounds. Pulmonary:      Effort: Pulmonary effort is normal.      Breath sounds: Examination of the right-upper field reveals rhonchi. Examination of the left-upper field reveals rhonchi. Examination of the right-middle field reveals rhonchi. Examination of the left-middle field reveals rhonchi. Examination of the right-lower field reveals rhonchi. Examination of the left-lower field reveals rhonchi. Rhonchi present. Chest:      Chest wall: No tenderness. Abdominal:      General: Abdomen is flat. Bowel sounds are normal.      Palpations: Abdomen is soft. Tenderness: There is no abdominal tenderness. Musculoskeletal:         General: No swelling or tenderness. Cervical back: Normal range of motion and neck supple. No tenderness.       Right lower leg: No tenderness. No edema. Left lower leg: No tenderness. No edema. Skin:     General: Skin is warm and dry. Neurological:      General: No focal deficit present. Mental Status: She is alert and oriented to person, place, and time.    Psychiatric:         Behavior: Behavior normal.        Orders Placed This Encounter   Procedures    COVID-19, Rapid    XR CHEST PORTABLE    CBC with Auto Differential    Comprehensive Metabolic Panel    Magnesium    Troponin    Cardiac Monitor - ED Only    Continuous Pulse Oximetry    Check Pulse Oximetry while ambulating    EKG 12 Lead    Saline lock IV       Procedures    ED EKG Interpretation  EKG was interpreted in the absence of a cardiologist.    Rate: Rate: Normal  EKG Interpretation: EKG Interpretation: sinus rhythm  ST Segments: Nonspecific ST segments - NO STEMI  Incomplete right bundle branch block    Results Include:    Recent Results (from the past 24 hour(s))   CBC with Auto Differential    Collection Time: 10/15/22 11:19 PM   Result Value Ref Range    WBC 8.1 4.3 - 11.1 K/uL    RBC 4.32 4.05 - 5.2 M/uL    Hemoglobin 13.5 11.7 - 15.4 g/dL    Hematocrit 41.9 35.8 - 46.3 %    MCV 97.0 82 - 102 FL    MCH 31.3 26.1 - 32.9 PG    MCHC 32.2 31.4 - 35.0 g/dL    RDW 12.9 11.9 - 14.6 %    Platelets 582 260 - 467 K/uL    MPV 9.4 9.4 - 12.3 FL    nRBC 0.00 0.0 - 0.2 K/uL    Differential Type AUTOMATED      Seg Neutrophils 44 43 - 78 %    Lymphocytes 38 13 - 44 %    Monocytes 14 (H) 4.0 - 12.0 %    Eosinophils % 3 0.5 - 7.8 %    Basophils 1 0.0 - 2.0 %    Immature Granulocytes 0 0.0 - 5.0 %    Segs Absolute 3.5 1.7 - 8.2 K/UL    Absolute Lymph # 3.1 0.5 - 4.6 K/UL    Absolute Mono # 1.2 0.1 - 1.3 K/UL    Absolute Eos # 0.3 0.0 - 0.8 K/UL    Basophils Absolute 0.1 0.0 - 0.2 K/UL    Absolute Immature Granulocyte 0.0 0.0 - 0.5 K/UL   Comprehensive Metabolic Panel    Collection Time: 10/15/22 11:19 PM   Result Value Ref Range    Sodium 140 133 - 143 mmol/L    Potassium 4.0 3.5 - 5.1 mmol/L    Chloride 107 101 - 110 mmol/L    CO2 31 21 - 32 mmol/L    Anion Gap 2 2 - 11 mmol/L    Glucose 105 (H) 65 - 100 mg/dL    BUN 16 6 - 23 MG/DL    Creatinine 0.60 0.6 - 1.0 MG/DL    Est, Glom Filt Rate >60 >60 ml/min/1.73m2    Calcium 9.0 8.3 - 10.4 MG/DL    Total Bilirubin 0.2 0.2 - 1.1 MG/DL    ALT 17 12 - 65 U/L    AST 8 (L) 15 - 37 U/L    Alk Phosphatase 111 50 - 136 U/L    Total Protein 7.1 6.3 - 8.2 g/dL    Albumin 3.5 3.5 - 5.0 g/dL    Globulin 3.6 2.8 - 4.5 g/dL    Albumin/Globulin Ratio 1.0 0.4 - 1.6     Magnesium    Collection Time: 10/15/22 11:19 PM   Result Value Ref Range    Magnesium 1.9 1.8 - 2.4 mg/dL   Troponin    Collection Time: 10/15/22 11:19 PM   Result Value Ref Range    Troponin, High Sensitivity 7.6 0 - 14 pg/mL   COVID-19, Rapid    Collection Time: 10/16/22  3:50 AM    Specimen: Nasopharyngeal   Result Value Ref Range    Source NASAL      SARS-CoV-2, Rapid Not detected NOTD            XR CHEST PORTABLE   Final Result      1. No evidence of acute pulmonary disease. ED Course as of 10/16/22 0455   Sun Oct 16, 2022   0217 Patient is resting comfortably in bed with pulse ox 97%. I reexamined her lung sounds and they are clear bilaterally. [CW]   7115 Patient ambulated and her oxygen dropped to 92% and she became short of breath and wheezing. Patient was placed back in bed and put on 2 L nasal cannula with oxygen saturation 95%. [CW]      ED Course User Index  [CW] Kendal Ortega MD       MDM  Number of Diagnoses or Management Options  COPD exacerbation [facility])  Diagnosis management comments: Patient with a history of COPD presents for cough and shortness of breath which appears to be from an exacerbation. Patient's initial blood pressure was low but her repeat was normal.  Patient's labs show no significant abnormalities. Patient's chest x-ray showed no acute findings.   Patient was given DuoNeb for wheezing on exam.  Patient states she does not tolerate steroids because it causes airway swelling. We did attempt ambulation trial patient did desaturate and become short of breath and wheezy. Patient was placed on supplemental oxygen and hospitalist was consulted for admission. Explanation: The diagnosis and plan as well as the results of any testing and treatments today in the department were communicated to the patient and/or their family/caregiver (if present). The patient/caregiver verbalized understanding and realize that they are being admitted to the hospital for further evaluation and treatment. All questions were answered at bedside. ICD-10-CM    1. COPD exacerbation ([address]  J44.1           DISPOSITION Decision To Admit 10/16/2022 03:50:08 AM       Voice dictation software was used during the making of this note. This software is not perfect and grammatical and other typographical errors may be present. This note has not been completely proofread for errors.      Clifford Bowen MD  10/16/22 2042

## 2022-10-16 NOTE — H&P
[facility] Hospitalist Initial History and Physical Note    Patient: Eunice Ortega Date: 10/16/2022  female, 47 y.o. Admit Date: 10/16/2022  Attending: Alexandria Nguyen MD     ASSESSMENT AND PLAN:     Principal Problem:    Acute respiratory failure with hypoxia ([address]  Plan: Wean O2 as tolerated  Active Problems:    COPD with acute exacerbation ([address]  Plan: Supplemental O2 as needed. Duoneb q4h. IV Solumedrol/Azithromycin. Pulm/pulm rehab consults. HTN (hypertension)  Plan: Stable. Continue home meds. Bipolar disorder ([address]  Plan: Stable. Continue home meds. Depression  Plan: Stable. Continue home meds. Seizure disorder ([address]  Plan: Stable. Continue home meds. DVT Prophylaxis: Lovenox      Code Status: FULL CODE      Disposition: Admit to med/surg for evaluation and treatment as per above. Anticipated discharge: 2-3 days     CHIEF COMPLAINT:  shortness of breath    HISTORY OF PRESENT ILLNESS:      Patient Active Problem List   Diagnosis    Acute respiratory failure with hypoxia (HCC)    RBBB (right bundle branch block with left anterior fascicular block)    Seizure (HCC)    Hypokalemia    HTN (hypertension)    Bipolar disorder (HCC)    Rhabdomyolysis    Depression    Thrush    Seizure disorder (HCC)    Leukocytosis    Incarcerated incisional hernia    Macrocytosis    De Quervain's tenosynovitis, left    Tobacco use disorder    Narcotic dependence ([address]    Pain, chronic    Pulmonary infiltrates    Cardiomegaly    COPD with acute exacerbation ([address]       Eunice Ortega is a 47 y.o. female, with a history of  has a past medical history of Acute renal failure ([address] Cancer ([address] COPD (chronic obstructive pulmonary disease) ([address] Endocrine disease, Hypertension, Other ill-defined conditions(799.89), Psychiatric disorder, Seizure disorder ([address] Seizures ([address] and Ralph Booze.,  has a past surgical history that includes hernia repair (2017); Cholecystectomy; Appendectomy;  Hysterectomy; and Dilation and curettage of uterus. who presents to the ER with report of shortness of breath, cough, and wheezing for the past 3-4 days. She tested negative for COVID in her PCP office yesterday. Reports using albuterol inhaler at home without improvement of symptoms. Denies any phlegm, nausea, vomiting, fevers, chills. Allergy  Allergies   Allergen Reactions    Iodine Anaphylaxis    Penicillins Anaphylaxis and Itching    Meloxicam Other (See Comments)     Interacts with her other medications. Tramadol Other (See Comments)     Patient states she cannot take tramadol because it causes seizures       Medication list  Not in a hospital admission.      Past Medical History   Past Medical History:   Diagnosis Date    Acute renal failure ([address] 2010    Cancer [address]     cervical    COPD (chronic obstructive pulmonary disease) ([facility] 2010    Endocrine disease     hypothyroid    Hypertension     managed with medicaitons    Other ill-defined conditions(799.89)     chronic back pain, migraines    Psychiatric disorder     anxiety    Seizure disorder ([facility] 2010    Seizures ([facility]     Teto Toure 10/23/2010       Past Surgical History:   Procedure Laterality Date    APPENDECTOMY      CHOLECYSTECTOMY      DILATION AND CURETTAGE OF UTERUS      HERNIA REPAIR  2017    HYSTERECTOMY         Social History   Social History     Socioeconomic History    Marital status: Single   Tobacco Use    Smoking status: Every Day     Packs/day: 1.00     Types: Cigarettes    Smokeless tobacco: Never   Substance and Sexual Activity    Alcohol use: No    Drug use: No   Social History Narrative    ** Data from: 9/16/10 Enc Dept: [facility] 3 INTENSIVE CARE         ** Merged History Encounter **    Lives with her boyfriend Venu Curiel and her son         ** Data from: 10/20/10 Enc Dept: D EMERGENCY DEPT       Family History:   Family History   Problem Relation Age of Onset    Heart Attack Father          age 46    Heart Disease Father     Seizures Mother REVIEW OF SYSTEMS:   A 14 point review of systems was taken and pertinent positive as per HPI. PHYSICAL EXAMINATION:  Vital 24 Hour Range Most Recent Value  Temperature Temp  Min: 98.6 °F (37 °C)  Max: 98.6 °F (37 °C) 98.6 °F (37 °C)    Pulse Pulse  Min: 70  Max: 72 70  Respiratory Resp  Min: 18  Max: 18 18  Blood Pressure BP  Min: 96/70  Max: 123/80 120/82  Pulse Oximetry SpO2  Min: 83 %  Max: 99 % 99 %  O2 O2 Flow Rate (L/min)  Avg: 3 L/min  Min: 3 L/min   Min taken time: 10/16/22 0329  Max: 3 L/min   Max taken time: 10/16/22 0329      Vital Most Recent Value First Value  Weight 180 lb (81.6 kg) Weight: 180 lb (81.6 kg)  Height 5' 7\" (170.2 cm) Height: 5' 7\" (170.2 cm)  BMI 28.3 N/A    Physical Exam:   General:     No acute distress, speaking in full sentences. Eyes:   No palpebral pallor or scleral icterus. ENT:   External auricular and nasal exam within normal limits. Cardiovascular: No cyanosis or edema of extremities. Respiratory:    No respiratory distress or accessory muscle use. Scattered bilateral rhonchi  Gastrointestinal:   Not actively vomiting, abdomen non-distended   Skin:      Normal color. No rashes, lesions, or jaundice. Neurologic:  CN II-XII grossly intact and symmetrical.      Moving all four extremities well with no focal deficits. Psychiatric:  Appropriate affect.  Alert     Intake/Output last 3 shifts:  No intake or output data in the 24 hours ending 10/16/22 0610     Labs:  Lab Results   Component Value Date     10/15/2022    K 4.0 10/15/2022     10/15/2022    CO2 31 10/15/2022    BUN 16 10/15/2022    CREATININE 0.60 10/15/2022    GLUCOSE 105 (H) 10/15/2022    CALCIUM 9.0 10/15/2022    PROT 7.1 10/15/2022    LABALBU 3.5 10/15/2022    BILITOT 0.2 10/15/2022    ALKPHOS 111 10/15/2022    AST 8 (L) 10/15/2022    ALT 17 10/15/2022    LABGLOM >60 10/15/2022    GFRAA >60 08/31/2020    AGRATIO 0.9 (L) 08/31/2020    GLOB 3.6 10/15/2022        Lab Results   Component Value Date/Time    MG 1.9 10/15/2022 11:19 PM     Lab Results   Component Value Date    CALCIUM 9.0 10/15/2022        Lab Results   Component Value Date    WBC 8.1 10/15/2022    HGB 13.5 10/15/2022    HCT 41.9 10/15/2022    MCV 97.0 10/15/2022     10/15/2022        No results found for: INR, PROTIME     No results found for: CKTOTAL, CKMB, CKMBINDEX, TROPONINI     Imagining & Other Studies  XR CHEST PORTABLE  Narrative: Portable chest xray      COMPARISON: August 31, 2020    INDICATION: Shortness of breath    FINDINGS:     Heart is enlarged, similar to prior exam. Mediastinal contour is within normal  limits. There is no focal pulmonary consolidation, pleural effusion or  pneumothorax. No pulmonary edema. Surrounding bones are intact. Post surgical  changes of the right proximal humerus. Impression: 1. No evidence of acute pulmonary disease. No results found for this or any previous visit (from the past 4464 hour(s)).      Orinda Hammans, MD  10/16/2022 6:10 AM

## 2022-10-16 NOTE — PROGRESS NOTES
TRANSFER - IN REPORT:    Verbal report received from Yael BARR on Jackie Tim  being received from ED for routine progression of patient care      Report consisted of patient's Situation, Background, Assessment and   Recommendations(SBAR). Information from the following report(s) Nurse Handoff Report was reviewed with the receiving nurse. Opportunity for questions and clarification was provided. Assessment completed upon patient's arrival to unit and care assumed.

## 2022-10-16 NOTE — PROGRESS NOTES
Pt arrived to floor at 0644. No signs of distress noted at this time. Pt arrived on 3L NC. Pt alert and oriented. SBAR given to oncoming nurse.

## 2022-10-16 NOTE — ED TRIAGE NOTES
Pt to triage ambulatory without assistance with c/o \"aint being able to breathe. \" Pt states testing Thursday for covid at her PCP and it was negative. Pt states PCP gave her allergy medication but pt states no change in breathing.  Pt states she is a smoker and still smoking even with \"first stage COPD\"

## 2022-10-16 NOTE — ED TRIAGE NOTES
Pt presents from EMS w/ cough and intermittent SOB x 2 days, afebrile, lung sounds clear, seen at PCP 2 days ago for same issue

## 2022-10-17 VITALS
RESPIRATION RATE: 17 BRPM | WEIGHT: 180 LBS | SYSTOLIC BLOOD PRESSURE: 127 MMHG | TEMPERATURE: 97.7 F | HEART RATE: 78 BPM | OXYGEN SATURATION: 92 % | HEIGHT: 67 IN | DIASTOLIC BLOOD PRESSURE: 77 MMHG | BODY MASS INDEX: 28.25 KG/M2

## 2022-10-17 LAB
ALBUMIN SERPL-MCNC: 3.3 G/DL (ref 3.5–5)
ALBUMIN/GLOB SERPL: 0.8 {RATIO} (ref 0.4–1.6)
ALP SERPL-CCNC: 104 U/L (ref 50–136)
ALT SERPL-CCNC: 19 U/L (ref 12–65)
ANION GAP SERPL CALC-SCNC: 2 MMOL/L (ref 2–11)
AST SERPL-CCNC: 6 U/L (ref 15–37)
BASOPHILS # BLD: 0 K/UL (ref 0–0.2)
BASOPHILS NFR BLD: 0 % (ref 0–2)
BILIRUB SERPL-MCNC: 0.1 MG/DL (ref 0.2–1.1)
BUN SERPL-MCNC: 21 MG/DL (ref 6–23)
CALCIUM SERPL-MCNC: 9 MG/DL (ref 8.3–10.4)
CHLORIDE SERPL-SCNC: 106 MMOL/L (ref 101–110)
CO2 SERPL-SCNC: 29 MMOL/L (ref 21–32)
CREAT SERPL-MCNC: 0.7 MG/DL (ref 0.6–1)
DIFFERENTIAL METHOD BLD: ABNORMAL
EOSINOPHIL # BLD: 0 K/UL (ref 0–0.8)
EOSINOPHIL NFR BLD: 0 % (ref 0.5–7.8)
ERYTHROCYTE [DISTWIDTH] IN BLOOD BY AUTOMATED COUNT: 12.7 % (ref 11.9–14.6)
GLOBULIN SER CALC-MCNC: 3.9 G/DL (ref 2.8–4.5)
GLUCOSE SERPL-MCNC: 130 MG/DL (ref 65–100)
HCT VFR BLD AUTO: 41.7 % (ref 35.8–46.3)
HGB BLD-MCNC: 13.7 G/DL (ref 11.7–15.4)
IMM GRANULOCYTES # BLD AUTO: 0.1 K/UL (ref 0–0.5)
IMM GRANULOCYTES NFR BLD AUTO: 1 % (ref 0–5)
LYMPHOCYTES # BLD: 1.6 K/UL (ref 0.5–4.6)
LYMPHOCYTES NFR BLD: 19 % (ref 13–44)
MCH RBC QN AUTO: 31.4 PG (ref 26.1–32.9)
MCHC RBC AUTO-ENTMCNC: 32.9 G/DL (ref 31.4–35)
MCV RBC AUTO: 95.4 FL (ref 82–102)
MONOCYTES # BLD: 0.5 K/UL (ref 0.1–1.3)
MONOCYTES NFR BLD: 5 % (ref 4–12)
NEUTS SEG # BLD: 6.4 K/UL (ref 1.7–8.2)
NEUTS SEG NFR BLD: 75 % (ref 43–78)
NRBC # BLD: 0 K/UL (ref 0–0.2)
PLATELET # BLD AUTO: 242 K/UL (ref 150–450)
PMV BLD AUTO: 9.3 FL (ref 9.4–12.3)
POTASSIUM SERPL-SCNC: 4.3 MMOL/L (ref 3.5–5.1)
PROT SERPL-MCNC: 7.2 G/DL (ref 6.3–8.2)
RBC # BLD AUTO: 4.37 M/UL (ref 4.05–5.2)
SODIUM SERPL-SCNC: 137 MMOL/L (ref 133–143)
WBC # BLD AUTO: 8.6 K/UL (ref 4.3–11.1)

## 2022-10-17 PROCEDURE — 6370000000 HC RX 637 (ALT 250 FOR IP): Performed by: FAMILY MEDICINE

## 2022-10-17 PROCEDURE — 85025 COMPLETE CBC W/AUTO DIFF WBC: CPT

## 2022-10-17 PROCEDURE — 6370000000 HC RX 637 (ALT 250 FOR IP): Performed by: INTERNAL MEDICINE

## 2022-10-17 PROCEDURE — 36415 COLL VENOUS BLD VENIPUNCTURE: CPT

## 2022-10-17 PROCEDURE — 6360000002 HC RX W HCPCS: Performed by: FAMILY MEDICINE

## 2022-10-17 PROCEDURE — 2580000003 HC RX 258: Performed by: FAMILY MEDICINE

## 2022-10-17 PROCEDURE — 80053 COMPREHEN METABOLIC PANEL: CPT

## 2022-10-17 PROCEDURE — 94760 N-INVAS EAR/PLS OXIMETRY 1: CPT

## 2022-10-17 PROCEDURE — 94640 AIRWAY INHALATION TREATMENT: CPT

## 2022-10-17 PROCEDURE — 97161 PT EVAL LOW COMPLEX 20 MIN: CPT

## 2022-10-17 RX ORDER — KETOROLAC TROMETHAMINE 30 MG/ML
30 INJECTION, SOLUTION INTRAMUSCULAR; INTRAVENOUS ONCE
Status: COMPLETED | OUTPATIENT
Start: 2022-10-17 | End: 2022-10-17

## 2022-10-17 RX ORDER — SUMATRIPTAN 50 MG/1
50 TABLET, FILM COATED ORAL ONCE
Status: COMPLETED | OUTPATIENT
Start: 2022-10-17 | End: 2022-10-17

## 2022-10-17 RX ORDER — IPRATROPIUM BROMIDE AND ALBUTEROL SULFATE 2.5; .5 MG/3ML; MG/3ML
3 SOLUTION RESPIRATORY (INHALATION) EVERY 6 HOURS PRN
Qty: 120 EACH | Refills: 0 | Status: SHIPPED | OUTPATIENT
Start: 2022-10-17

## 2022-10-17 RX ORDER — AZITHROMYCIN 500 MG/1
500 TABLET, FILM COATED ORAL DAILY
Qty: 3 TABLET | Refills: 0 | Status: SHIPPED | OUTPATIENT
Start: 2022-10-18 | End: 2022-10-21

## 2022-10-17 RX ORDER — FLUTICASONE PROPIONATE AND SALMETEROL 100; 50 UG/1; UG/1
1 POWDER RESPIRATORY (INHALATION) EVERY 12 HOURS
Qty: 1 EACH | Refills: 0 | Status: SHIPPED | OUTPATIENT
Start: 2022-10-17

## 2022-10-17 RX ORDER — BUDESONIDE 0.5 MG/2ML
500 INHALANT ORAL 2 TIMES DAILY
Qty: 16 ML | Refills: 0 | Status: SHIPPED | OUTPATIENT
Start: 2022-10-17 | End: 2022-10-21

## 2022-10-17 RX ORDER — PREDNISONE 20 MG/1
40 TABLET ORAL DAILY
Qty: 8 TABLET | Refills: 0 | Status: SHIPPED | OUTPATIENT
Start: 2022-10-17 | End: 2022-10-21

## 2022-10-17 RX ADMIN — IPRATROPIUM BROMIDE AND ALBUTEROL SULFATE 1 AMPULE: .5; 3 SOLUTION RESPIRATORY (INHALATION) at 11:15

## 2022-10-17 RX ADMIN — SUMATRIPTAN SUCCINATE 50 MG: 50 TABLET ORAL at 11:18

## 2022-10-17 RX ADMIN — DIVALPROEX SODIUM 500 MG: 250 TABLET, EXTENDED RELEASE ORAL at 09:36

## 2022-10-17 RX ADMIN — METHYLPREDNISOLONE SODIUM SUCCINATE 40 MG: 125 INJECTION, POWDER, FOR SOLUTION INTRAMUSCULAR; INTRAVENOUS at 09:45

## 2022-10-17 RX ADMIN — GABAPENTIN 800 MG: 400 CAPSULE ORAL at 09:37

## 2022-10-17 RX ADMIN — SODIUM CHLORIDE, PRESERVATIVE FREE 5 ML: 5 INJECTION INTRAVENOUS at 09:42

## 2022-10-17 RX ADMIN — CLONAZEPAM 1 MG: 0.5 TABLET ORAL at 09:37

## 2022-10-17 RX ADMIN — ACETAMINOPHEN 650 MG: 325 TABLET ORAL at 04:23

## 2022-10-17 RX ADMIN — IPRATROPIUM BROMIDE AND ALBUTEROL SULFATE 1 AMPULE: .5; 3 SOLUTION RESPIRATORY (INHALATION) at 07:31

## 2022-10-17 RX ADMIN — KETOROLAC TROMETHAMINE 30 MG: 30 INJECTION, SOLUTION INTRAMUSCULAR at 04:23

## 2022-10-17 RX ADMIN — AZITHROMYCIN MONOHYDRATE 500 MG: 250 TABLET ORAL at 09:37

## 2022-10-17 RX ADMIN — ENOXAPARIN SODIUM 40 MG: 100 INJECTION SUBCUTANEOUS at 09:36

## 2022-10-17 RX ADMIN — PHENYTOIN SODIUM 300 MG: 100 CAPSULE ORAL at 09:37

## 2022-10-17 RX ADMIN — METHYLPREDNISOLONE SODIUM SUCCINATE 40 MG: 125 INJECTION, POWDER, FOR SOLUTION INTRAMUSCULAR; INTRAVENOUS at 03:45

## 2022-10-17 ASSESSMENT — PAIN DESCRIPTION - DESCRIPTORS: DESCRIPTORS: ACHING;POUNDING;SHOOTING

## 2022-10-17 ASSESSMENT — PAIN SCALES - GENERAL
PAINLEVEL_OUTOF10: 10
PAINLEVEL_OUTOF10: 8

## 2022-10-17 ASSESSMENT — PAIN DESCRIPTION - LOCATION
LOCATION: HEAD
LOCATION: HEAD

## 2022-10-17 NOTE — CARE COORDINATION
MSN, CM:  spoke with patient this AM about discharge planning. Patient lives alone in a tent behind son's house. Patient is independent with all ADL's and requires no equipment for ambulation. Patient is on disability and brings home$800.00/mt. patient states she cannot afford to rent any place. Affordable house paper to be given. PT consulted for evaluation and recommendations r/t recent fall. Case Management will continue to follow for any discharge needs.     ASSESSMENT NOTE    Attending Physician: Sohan Reis MD  Admit Problem: COPD exacerbation [facility]) [J44.1]  Acute respiratory failure with hypoxia ([facility] [J96.01]  Date/Time of Admission: 10/16/2022 12:26 AM  Problem List:  Patient Active Problem List   Diagnosis    Acute respiratory failure with hypoxia (HCC)    RBBB (right bundle branch block with left anterior fascicular block)    Seizure ([facility])    Hypokalemia    HTN (hypertension)    Bipolar disorder (HCC)    Rhabdomyolysis    Depression    Thrush    Seizure disorder ([facility]    Leukocytosis    Incarcerated incisional hernia    Macrocytosis    De Quervain's tenosynovitis, left    Tobacco use disorder    Narcotic dependence ([facility]    Pain, chronic    Pulmonary infiltrates    Cardiomegaly    COPD with acute exacerbation [facility])       Service Assessment  Patient Orientation Alert and Oriented   Cognition Alert   History Provided By Patient   Primary Caregiver Self   Accompanied By/Relationship     [address] is: Legal Next of Kin Ailyn Hernandez - son)   PCP Verified by CM Yes   Last Visit to PCP Within last 3 months   Prior Functional Level Independent in ADLs/IADLs   Current Functional Level Independent in ADLs/IADLs   Can patient return to prior living arrangement Yes   Ability to make needs known: Good   Family able to assist with home care needs: Yes   Would you like for me to discuss the discharge plan with any other family members/significant others, and if so, who? Yes   Financial Resources Medicare, Medicaid, Food [place]   Community Resources     CM/SW Referral       Social/Functional History  Lives With Alone   Type of Home Homeless (Tent)   Home Layout     Home Access     Entrance Stairs - Number of Steps     Entrance Stairs - Rails     Bathroom Shower/Tub     Bathroom Toilet     Bathroom Equipment     Bathroom Accessibility     Home Equipment     Receives Help From     [email or web address] Work     Driving     Shopping          Other ([place])     Homemaking Responsibilities Yes   [address] [facility] [address] Paying/Finance [address] [facility] Management     Other (Comment)     Ambulation Assistance Independent   Transfer Assistance Independent   Active  No   Patient's  Info son   Mode of Transportation     Education     Occupation On disability   Type of Occupation       Discharge Planning   Type of Residence Other (Comment) (tent)   [address] Prior To Admission None   Potential Assistance Needed     DME     DME     DME Ordered? No   Potential Assistance Purchasing Medications No   Meds-to-Beds: Does the patient want to have any new prescriptions delivered to bedside prior to discharge? Type of Home Care Services None   Patient expects to be discharged to: Other (comment) (tent)   Follow Up Appointment: Best Day/Time     One/Two Story Residence:     # of Interior Steps     Height of Each Step (in)     [employer] Available     History of Falls? Yes     Services At/After Discharge  Transition of Care Consult (CM Consult):      Internal Home Health Internal Hospice     Reason Outside Agency [address]     Partner SNF     Reason Why Partner SNF Not Chosen     Internal Comfort Care     Reason Outside [address] Discharge     [address] Provided? Mode of Transport at [facility] Time of Discharge     Confirm Follow Up Transport       Condition of Participation: Discharge Planning  The plan for Transition of Care is related to the following treatment goals: The Patient and/or Patient Representative was provided with a Choice of Provider? Name of the Patient Representative who was provided with the Choice of Provider and agrees with the Discharge Plan? The Patient and/or Patient Representative Agree with the Discharge Plan? Freedom of Choice list was provided with basic dialogue that supports the individualized plan of care/goals, treatment preferences, and shares the quality data associated with the providers?        Documentation for Discharge Appeal  Discharge Appealed by     Date notified by [facility] of appeal request:     Time notified by [facility] of appeal request:     Detailed Notice of Discharge given to:     Date Notice of Discharge given:     Time Notice of Discharge given:     Date records sent to [facility]     Time records sent to Jacoby Riley     Date Notified of Outcome     Time Notified of Outcome     Outcome of appeal           Megan Meyer RN 10/17/22 9:26 AM

## 2022-10-17 NOTE — PROGRESS NOTES
10/17/22 0914   Resting (Room Air)   SpO2 93   HR 75   Resting (On O2)   Comments Patient on Room Air   During Walk (Room Air)   SpO2 90   HR 78   Walk/Assistance Device Ambulation   Rate of Dyspnea 0   During Walk (On O2)   Need Additional O2 Flow Rate Rows No   Comments No supplemental oxygen needed   After Walk   SpO2 91   HR 81   Rate of Dyspnea 0   Comments Patient sat dropped to 90% during walk.  once patient rested it increased to 92%   Does the Patient Qualify for Home O2 No   Does the Patient Need Portable Oxygen Tanks No

## 2022-10-17 NOTE — PROGRESS NOTES
Patient reported headache, per pt Toradol did not work for her headache last night. Dr. Lino Richardson notified. Orders obtained for Imitrex 50mg po once.

## 2022-10-17 NOTE — PROGRESS NOTES
Alert and Oriented x4. Respirations even and unlabored,no signs of distress, on room air. PIV removed without complications. This nurse provided discharge teaching, no questions from pt at this time. Discharged via wheelchair by Boom Rae.

## 2022-10-17 NOTE — PROGRESS NOTES
ACUTE PHYSICAL THERAPY GOALS:   (Developed with and agreed upon by patient and/or caregiver.)  N/a: eval and d/c    PHYSICAL THERAPY Initial Assessment and Discharge  (Link to Caseload Tracking: PT Visit Days : 1  Acknowledge Orders  Time In/Out  PT Charge Capture  Rehab Caseload Tracker    Lori Simon is a 47 y.o. female   PRIMARY DIAGNOSIS: Acute respiratory failure with hypoxia ([address]  COPD exacerbation ([address] [J44.1]  Acute respiratory failure with hypoxia ([address] [J96.01]       Reason for Referral: Other abnormalities of gait and mobility (R26.89)  History of falling (Z91.81)  Inpatient: Payor: Caryl Phlegm / Plan: Luz Laura / Product Type: *No Product type* /     ASSESSMENT:     REHAB RECOMMENDATIONS:   Recommendation to date pending progress:  Setting:  No further skilled therapy after discharge from hospital    Equipment:    None     ASSESSMENT:  Ms. Sherryle Greenland is a 47year old F who presents with a COPD exacerbation. Pt reports she is feeling better and has been weaned down back to room air. At baseline, pt is independent. She lives in a tent behind her son's house but she reports bed is elevated off the ground. She does reports 1 fall- she tripped while walking outside at night with no light. She denies any other falls. Pt was received up ad niraj in room. She was able to ambulate 250 ft with independence, SpO2 92% afterwards. No gait, strength, or balance deficits noted. Pt presents as functioning at her baseline and therefore skilled PT is not indicated. Will discharge from caseload at this time.       [address] [facility] [address] [address] AM-PAC 6 Clicks Basic Mobility Inpatient Short Form  AM-PAC Mobility Inpatient   How much difficulty turning over in bed?: None  How much difficulty sitting down on / standing up from a chair with arms?: None  How much difficulty moving from lying on back to sitting on side of bed?: None  How much help from another person moving to and from a bed to a chair?: None  How much help from another person needed to walk in hospital room?: None  How much help from another person for climbing 3-5 steps with a railing?: None  AM-PAC Inpatient Mobility Raw Score : 24  AM-PAC Inpatient T-Scale Score : 61.14  Mobility Inpatient CMS 0-100% Score: 0  Mobility Inpatient CMS G-Code Modifier : CH    SUBJECTIVE:   Ms. [facility] states, \"my tent is bigger than this room\"     Social/Functional Lives With: Alone  Type of Home: Homeless (Tent)  ADL Assistance: [address] [place] [address] Avenue: Independent  Homemaking Responsibilities: Yes  Ambulation Assistance: Independent  Transfer Assistance: Independent  Active : No  Patient's  Info: son  Occupation: On disability    OBJECTIVE:     PAIN: VITALS / O2: PRECAUTION / Bennett Marquez / Mami Jonatan:   Pre Treatment:   Pain Assessment: None - Denies Pain      Post Treatment: 0 Vitals        Oxygen  SpO2: 92 % (post ambulation on room air)   None    RESTRICTIONS/PRECAUTIONS:                    GROSS EVALUATION: B LE Intact Impaired (Comments):   AROM [x]     PROM []    Strength [x]     Balance [x] Posture: Good  Sitting - Static: Good  Sitting - Dynamic: Good  Standing - Static: Good  Standing - Dynamic: Good   Posture [] N/A   Sensation [x]  Light touch   Coordination []      Tone []     Edema []    Activity Tolerance [x] Patient Tolerated treatment well    []      COGNITION/  PERCEPTION: Intact Impaired (Comments):   Orientation [x]     Vision [x]     Hearing [x]     Cognition  [x]       MOBILITY: I Mod I S SBA CGA Min Mod Max Total  NT x2 Comments:   Bed Mobility    Rolling [] [] [] [] [] [] [] [] [] [x] [] Pt received up ad niraj in room   Supine to Sit [] [] [] [] [] [] [] [] [] [x] []    Scooting [] [] [] [] [] [] [] [] [] [x] []    Sit to Supine [] [] [] [] [] [] [] [] [] [x] []    Transfers    Sit to Stand [x] [] [] [] [] [] [] [] [] [] []    Bed to Chair [] [] [] [] [] [] [] [] [] [x] []    Stand to Sit [x] [] [] [] [] [] [] [] [] [] []     [] [] [] [] [] [] [] [] [] [] []    I=Independent, Mod I=Modified Independent, S=Supervision, SBA=Standby Assistance, CGA=Contact Guard Assistance,   Min=Minimal Assistance, Mod=Moderate Assistance, Max=Maximal Assistance, Total=Total Assistance, NT=Not Tested    GAIT: I Mod I S SBA CGA Min Mod Max Total  NT x2 Comments:   Level of Assistance [x] [] [] [] [] [] [] [] [] [] []    Distance 250 feet    DME None    Gait Quality WFL    Weightbearing Status      Stairs      I=Independent, Mod I=Modified Independent, S=Supervision, SBA=Standby Assistance, CGA=Contact Guard Assistance,   Min=Minimal Assistance, Mod=Moderate Assistance, Max=Maximal Assistance, Total=Total Assistance, NT=Not Tested    PLAN:   FREQUENCY AND DURATION:  (eval and d/c) for duration of hospital stay or until stated goals are met, whichever comes first.    THERAPY PROGNOSIS:  n/a    PROBLEM LIST:   (Skilled intervention is medically necessary to address:)  N/a INTERVENTIONS PLANNED:   (Benefits and precautions of physical therapy have been discussed with the patient.)  N/a       TREATMENT:   EVALUATION: LOW COMPLEXITY: (Untimed Charge)    TREATMENT:   N/a: eval and d/c    TREATMENT GRID:  N/A    AFTER TREATMENT PRECAUTIONS: Bed, Bed/Chair Locked, Call light within reach, Needs within reach, and RN notified    INTERDISCIPLINARY COLLABORATION:  RN/ PCT and RN Case Manager/      EDUCATION: Education Given To: Patient  Education Provided: Role of Therapy;Plan of Care; Fall Prevention Strategies; Energy Conservation  Education Method: Verbal  Barriers to Learning: None  Education Outcome: Verbalized understanding    TIME IN/OUT:  Time In: 1107  Time Out: 1115  Minutes: 8    GRETEL CANO, PT

## 2022-10-17 NOTE — DISCHARGE SUMMARY
Date of Admission: 10/16/2022  Date of Discharge: 10/17/2022    Discharge Diagnoses:  Principal Problem:    Acute respiratory failure with hypoxia ([facility]  Active Problems:    HTN (hypertension)    Bipolar disorder (HCC)    Depression    Seizure disorder (HCC)    COPD with acute exacerbation ([facility]  Resolved Problems:    CAP (community acquired pneumonia)       Discharge Medications:  Discharge Medication List as of 10/17/2022 11:35 AM        START taking these medications    Details   azithromycin (ZITHROMAX) 500 MG tablet Take 1 tablet by mouth daily for 3 doses, Disp-3 tablet, R-0Normal      budesonide (PULMICORT) 0.5 MG/2ML nebulizer suspension Take 2 mLs by nebulization 2 times daily for 4 days, Disp-16 mL, R-0Normal      fluticasone-salmeterol (ADVAIR DISKUS) 100-50 MCG/ACT AEPB diskus inhaler Inhale 1 puff into the lungs in the morning and 1 puff in the evening., Disp-1 each, R-0Normal      predniSONE (DELTASONE) 20 MG tablet Take 2 tablets by mouth daily for 4 days, Disp-8 tablet, R-0Normal           CONTINUE these medications which have CHANGED    Details   ipratropium-albuterol (DUONEB) 0.5-2.5 (3) MG/3ML SOLN nebulizer solution Inhale 3 mLs into the lungs every 6 hours as needed for Shortness of Breath, Disp-120 each, R-0Normal           CONTINUE these medications which have NOT CHANGED    Details   albuterol sulfate HFA (PROVENTIL;VENTOLIN;PROAIR) 108 (90 Base) MCG/ACT inhaler Inhale 2 puffs into the lungs every 6 hours as neededHistorical Med      carvedilol (COREG) 12.5 MG tablet Take 12.5 mg by mouth every eveningHistorical Med      clonazePAM (KLONOPIN) 1 MG tablet Take by mouth every 4 hours. Historical Med      divalproex (DEPAKOTE ER) 250 MG extended release tablet Take 500 mg by mouth 2 times dailyHistorical Med      gabapentin (NEURONTIN) 100 MG capsule Take 800 mg by mouth in the morning and 800 mg in the evening. Historical Med      phenytoin (DILANTIN) 100 MG ER capsule Take 300 mg by mouth 2 times dailyHistorical Med      traZODone (DESYREL) 100 MG tablet Take 100 mg by mouthHistorical Med             Pending Labs:  None    Follow-up (including scheduled tests):  PMD    History of Present Illness:  Per Dr Mercedes Daly  \"71 y.o. female, with a history of  has a past medical history of Acute renal failure ([address] Cancer ([address] COPD (chronic obstructive pulmonary disease) ([address] Endocrine disease, Hypertension, Other ill-defined conditions(799.89), Psychiatric disorder, Seizure disorder ([address] Seizures ([address] and Yadira Chowdhury.,  has a past surgical history that includes hernia repair (2017); Cholecystectomy; Appendectomy; Hysterectomy; and Dilation and curettage of uterus. who presents to the ER with report of shortness of breath, cough, and wheezing for the past 3-4 days. She tested negative for COVID in her PCP office yesterday. Reports using albuterol inhaler at home without improvement of symptoms. Denies any phlegm, nausea, vomiting, fevers, chills. \"    Past Medical History:  Past Medical History:   Diagnosis Date    Acute renal failure ([address] 9/12/2010    Cancer ([address]     cervical    COPD (chronic obstructive pulmonary disease) ([address] 9/16/2010    Endocrine disease     hypothyroid    Hypertension     managed with medicaitons    Other ill-defined conditions(799.89)     chronic back pain, migraines    Psychiatric disorder     anxiety    Seizure disorder ([address] 9/12/2010    Seizures ([address]     Yadira Chowdhury 10/23/2010       Allergies: Allergies   Allergen Reactions    Iodine Anaphylaxis    Penicillins Anaphylaxis and Itching    Meloxicam Other (See Comments)     Interacts with her other medications. Tramadol Other (See Comments)     Patient states she cannot take tramadol because it causes seizures       Hospital Course:  50-year-old female with past medical history of COPD that presented in the setting of shortness of breath.   She was started treatment for COPD exacerbation with oxygen therapy to maintain oxygen saturation more than 92%, Solu-Medrol, azithromycin and nebulizer treatments. She improve her symptomatology. No oxygen requirement on discharge. Outpatient follow-up with primary care doctor.     Procedures:  None    Discharge Day Information:  Follow with PMD    Discharge Physical Exam:  General: Alert, oriented, NAD  HEENT: NC/AT, EOM are intact  Neck: supple, no JVD  Cardiovascular: RRR, S1, S2, no murmurs  Respiratory: Lungs are clear, no wheezes or rales  Abdomen: Soft, NT, ND  Back: No CVA tenderness, no paraspinal tenderness  Extremities: LE without pedal edema, no erythema  Neuro: A&O, CN are intact, no focal deficits  Skin: no rash or ulcers  Psych: good mood and affect    Recent Results (from the past 24 hour(s))   Comprehensive Metabolic Panel w/ Reflex to MG    Collection Time: 10/17/22  3:14 AM   Result Value Ref Range    Sodium 137 133 - 143 mmol/L    Potassium 4.3 3.5 - 5.1 mmol/L    Chloride 106 101 - 110 mmol/L    CO2 29 21 - 32 mmol/L    Anion Gap 2 2 - 11 mmol/L    Glucose 130 (H) 65 - 100 mg/dL    BUN 21 6 - 23 MG/DL    Creatinine 0.70 0.6 - 1.0 MG/DL    Est, Glom Filt Rate >60 >60 ml/min/1.73m2    Calcium 9.0 8.3 - 10.4 MG/DL    Total Bilirubin 0.1 (L) 0.2 - 1.1 MG/DL    ALT 19 12 - 65 U/L    AST 6 (L) 15 - 37 U/L    Alk Phosphatase 104 50 - 136 U/L    Total Protein 7.2 6.3 - 8.2 g/dL    Albumin 3.3 (L) 3.5 - 5.0 g/dL    Globulin 3.9 2.8 - 4.5 g/dL    Albumin/Globulin Ratio 0.8 0.4 - 1.6     CBC with Auto Differential    Collection Time: 10/17/22  3:14 AM   Result Value Ref Range    WBC 8.6 4.3 - 11.1 K/uL    RBC 4.37 4.05 - 5.2 M/uL    Hemoglobin 13.7 11.7 - 15.4 g/dL    Hematocrit 41.7 35.8 - 46.3 %    MCV 95.4 82 - 102 FL    MCH 31.4 26.1 - 32.9 PG    MCHC 32.9 31.4 - 35.0 g/dL    RDW 12.7 11.9 - 14.6 %    Platelets 585 400 - 744 K/uL    MPV 9.3 (L) 9.4 - 12.3 FL    nRBC 0.00 0.0 - 0.2 K/uL    Differential Type AUTOMATED      Seg Neutrophils 75 43 - 78 %    Lymphocytes 19 13 - 44 % Monocytes 5 4.0 - 12.0 %    Eosinophils % 0 (L) 0.5 - 7.8 %    Basophils 0 0.0 - 2.0 %    Immature Granulocytes 1 0.0 - 5.0 %    Segs Absolute 6.4 1.7 - 8.2 K/UL    Absolute Lymph # 1.6 0.5 - 4.6 K/UL    Absolute Mono # 0.5 0.1 - 1.3 K/UL    Absolute Eos # 0.0 0.0 - 0.8 K/UL    Basophils Absolute 0.0 0.0 - 0.2 K/UL    Absolute Immature Granulocyte 0.1 0.0 - 0.5 K/UL        XR CHEST PORTABLE   Final Result      1. No evidence of acute pulmonary disease.            Condition: Improved    Disposition: Home     Consultants During This Hospitalization: None         Spent 32 minutes on discharge services Additional Progress Note...

## 2022-10-17 NOTE — PROGRESS NOTES
Received report from Fernando Maurice RN. Pt resting in bed on room air. Respirations even and unlabored. Bed in lowest position and call light within reach.

## 2022-10-17 NOTE — PLAN OF CARE
Problem: Discharge Planning  Goal: Discharge to home or other facility with appropriate resources  Outcome: Completed     Problem: Pain  Goal: Verbalizes/displays adequate comfort level or baseline comfort level  Outcome: Completed     Problem: Safety - Adult  Goal: Free from fall injury  Outcome: Completed  Flowsheets (Taken 10/17/2022 0332 by Francois Vera RN)  Free From Fall Injury: Instruct family/caregiver on patient safety

## 2022-10-17 NOTE — PROGRESS NOTES
Patient reported that she has had trouble sleeping the past few nights. Dr. Chapman Her notified. Order obtained for a one time dose of 6 mg of melatonin. Gave med, see MAR.

## 2022-10-17 NOTE — PROGRESS NOTES
Patient resting in bed on room air. A&Ox4. Respirations even and unlabored. Patient denies pain and is in no acute distress at this time. No acute events overnight. Call bell within reach, patient instructed to call if assistance is needed. Preparing to give report to oncoming RN.

## 2022-10-17 NOTE — CARE COORDINATION
MSN, CM:  patient to be discharged home today with no services ordered or requested. MD requested a nebulizer for patient which was provided by [employer].  Patient agrees with this discharge plan and has met all milestones for this admission. Family to transport patient home. 10/17/22 1151   Service Assessment   [facility] O[facility] and [facility] At/After [facility]   Services At/After Discharge None   Mode of Transport at Discharge Other (see comment)  (fmaily to transport)   Confirm Follow Up Transport Self   Condition of Participation: Discharge Planning   The Patient and/Or Patient Representative agree with the Discharge Plan?  Yes

## 2022-10-18 ENCOUNTER — TELEPHONE (OUTPATIENT)
Dept: INTERNAL MEDICINE CLINIC | Facility: CLINIC | Age: 52
End: 2022-10-18

## 2022-10-18 NOTE — TELEPHONE ENCOUNTER
Called patient to schedule TCV appt following discharge from [facility] 10/17/2022. Dx Acute respiratory failure with hypoxia (HCC)  Active Problems:    HTN (hypertension)    Bipolar disorder (HCC)    Depression    Seizure disorder ([facility]    COPD with acute exacerbation ([facility]    Patient to follow up with PCP. L/M for patient to call.

## 2022-10-18 NOTE — TELEPHONE ENCOUNTER
Patient states her PCP is with [facility] ans could not get appt sooner with  her PCP. Has appt mid November . Patient states she is still having a lot of congestion and is having a hard time coughing up phlegm. States will finish abx tonight. Going to  [facility] rx today. States the nebulizer treatment do help. States if she needs she will call to schedule appt with Dr Cyndie Siemens prior to seeing PCP.

## 2023-02-22 ENCOUNTER — APPOINTMENT (OUTPATIENT)
Dept: GENERAL RADIOLOGY | Age: 53
End: 2023-02-22
Payer: MEDICARE

## 2023-02-22 ENCOUNTER — HOSPITAL ENCOUNTER (EMERGENCY)
Age: 53
Discharge: HOME | End: 2023-02-22
Attending: EMERGENCY MEDICINE
Payer: MEDICARE

## 2023-02-22 VITALS
TEMPERATURE: 98.2 F | HEART RATE: 63 BPM | RESPIRATION RATE: 18 BRPM | BODY MASS INDEX: 31.08 KG/M2 | HEIGHT: 67 IN | DIASTOLIC BLOOD PRESSURE: 87 MMHG | WEIGHT: 198 LBS | SYSTOLIC BLOOD PRESSURE: 120 MMHG | OXYGEN SATURATION: 94 %

## 2023-02-22 DIAGNOSIS — M25.561 RIGHT MEDIAL KNEE PAIN: Primary | ICD-10-CM

## 2023-02-22 PROCEDURE — 6370000000 HC RX 637 (ALT 250 FOR IP): Performed by: STUDENT IN AN ORGANIZED HEALTH CARE EDUCATION/TRAINING PROGRAM

## 2023-02-22 PROCEDURE — 99283 EMERGENCY DEPT VISIT LOW MDM: CPT | Performed by: EMERGENCY MEDICINE

## 2023-02-22 PROCEDURE — 73562 X-RAY EXAM OF KNEE 3: CPT

## 2023-02-22 RX ORDER — NAPROXEN 500 MG/1
500 TABLET ORAL 2 TIMES DAILY
Qty: 60 TABLET | Refills: 0 | Status: SHIPPED | OUTPATIENT
Start: 2023-02-22 | End: 2023-03-24

## 2023-02-22 RX ORDER — HYDROCODONE BITARTRATE AND ACETAMINOPHEN 5; 325 MG/1; MG/1
1 TABLET ORAL
Status: COMPLETED | OUTPATIENT
Start: 2023-02-22 | End: 2023-02-22

## 2023-02-22 RX ADMIN — HYDROCODONE BITARTRATE AND ACETAMINOPHEN 1 TABLET: 5; 325 TABLET ORAL at 15:43

## 2023-02-22 ASSESSMENT — ENCOUNTER SYMPTOMS
COUGH: 0
SORE THROAT: 0
APNEA: 0
TROUBLE SWALLOWING: 0
SHORTNESS OF BREATH: 0
DIARRHEA: 0
RHINORRHEA: 0
VOICE CHANGE: 0
PHOTOPHOBIA: 0
WHEEZING: 0
FACIAL SWELLING: 0
EYE PAIN: 0
CHEST TIGHTNESS: 0
VOMITING: 0
ABDOMINAL PAIN: 0
EYE DISCHARGE: 0
EYE REDNESS: 0

## 2023-02-22 ASSESSMENT — PAIN DESCRIPTION - FREQUENCY: FREQUENCY: CONTINUOUS

## 2023-02-22 ASSESSMENT — PAIN SCALES - GENERAL: PAINLEVEL_OUTOF10: 10

## 2023-02-22 ASSESSMENT — PAIN DESCRIPTION - ORIENTATION: ORIENTATION: RIGHT

## 2023-02-22 ASSESSMENT — PAIN DESCRIPTION - PAIN TYPE: TYPE: ACUTE PAIN

## 2023-02-22 ASSESSMENT — PAIN DESCRIPTION - LOCATION: LOCATION: LEG

## 2023-02-22 NOTE — Clinical Note
Donaldo Juarez was seen and treated in our emergency department on 2/22/2023. She may return to work on 02/23/2023.  ? If you have any questions or concerns, please don't hesitate to call.       Jc Baker

## 2023-02-22 NOTE — ED TRIAGE NOTES
Pt arrives POV with reports of right leg pain r/t to over use. Pt states she twisted her left ankle and has been using her right leg for everything and has been experiencing pain x 1 week. Pt is ambulatory in triage with NAD.

## 2023-02-22 NOTE — ED PROVIDER NOTES
Emergency Department Provider Note                   PCP:                On File Not (Inactive)               Age: 47 y.o. Sex: female       ICD-10-CM    1. Right medial knee pain  M25.561 [facility] - Physical Therapy, [facility]          DISPOSITION Decision To Discharge 02/22/2023 03:47:40 PM        Medical Decision Making  In summary this is a 50-year-old female patient who presents today complaining of right knee pain. Suspect acute right knee strain. Based on my evaluation I feel the patient is at low risk for alternative causes such as compartment syndrome, distal neurovascular injury, open fracture, septic joint, DVT. The reasoning behind my decision process is that the patient is overall well-appearing with no acute distress noted. There is no presence of an open wound that would be indicative of open fracture. Compartments are soft and nontender without evidence of compartment syndrome. Distal vasculature and sensation is intact with brisk capillary refill. My independent interpretation of initial x-ray evaluation is grossly unremarkable. Well score for DVT of -2 making this diagnosis unlikely. Do not feel that further work-up is indicated as I would expect some calf Swelling or tenderness or erythema. There is no erythema or warmth or fevers or inability to bear weight or severe pain with range of motion to suggest septic joint. No tachycardia. Patient does have a clear mechanism to exacerbate her pain which is compensation from injury in the left side. I feel this patient would benefit the most from physical therapy so I have placed a referral.  Anti-inflammatory pain medication given. Ace wrap given. I have specifically counseled the patient on warning signs to return immediately for including but not limited to signs of compartment syndrome, infection. The patient has verbalized understanding and is in agreement with the treatment plan.        Amount and/or Complexity of Data Reviewed  Radiology: ordered. Risk  Prescription drug management. Complexity of Problem: 1 stable, acute illness. (3)    I have conducted an independent ordering and review of X-rays. Considerations: Shared decision making was utilized in the care of this patient. Considerations: The following treatments were considered but not given: Rx such as antibiotics. Leg ultrasound, knee joint aspiration    Evidence based risk calculation performed: Well score    Patient was discharged risks and benefits of hospitalization were discussed. Orders Placed This Encounter   Procedures    XR KNEE RIGHT (3 VIEWS)    [facility] - Physical Therapy, [facility]        Medications   HYDROcodone-acetaminophen (NORCO) 5-325 MG per tablet 1 tablet (1 tablet Oral Given 2/22/23 1543)       Discharge Medication List as of 2/22/2023  4:01 PM        START taking these medications    Details   naproxen (NAPROSYN) 500 MG tablet Take 1 tablet by mouth 2 times daily, Disp-60 tablet, R-0Print              Robert Butts is a 47 y.o. female who presents to the Emergency Department with chief complaint of  No chief complaint on file. 70-year-old female patient with history of COPD, hypothyroidism, hypertension presents today complaining of right knee pain has been going for the past week. She reports that she recently injured her left ankle and has been limping favoring that side which has been causing her to have pain in the right side from compensation. She reports it has been constant but worse with movements. Has been using ibuprofen with mild relief. Notes some associated minor swelling. Denies inability to bear weight, paresthesias, loss of range of motion. Denies trauma or falls. Denies calf pain or swelling. Denies erythema or warmth or purulence. Denies fevers. Patient is primary historian and quality is reliable. The history is provided by the patient.  No  was used. Review of Systems   Constitutional:  Negative for fatigue and fever. HENT:  Negative for congestion, ear pain, facial swelling, hearing loss, rhinorrhea, sore throat, trouble swallowing and voice change. Eyes:  Negative for photophobia, pain, discharge, redness and visual disturbance. Respiratory:  Negative for apnea, cough, chest tightness, shortness of breath and wheezing. Cardiovascular:  Negative for chest pain and palpitations. Gastrointestinal:  Negative for abdominal pain, diarrhea and vomiting. Endocrine: Negative for polydipsia, polyphagia and polyuria. Genitourinary:  Negative for decreased urine volume, dysuria, flank pain, frequency and hematuria. Musculoskeletal:  Positive for arthralgias and joint swelling. Negative for myalgias and neck stiffness. Skin:  Negative for rash and wound. Neurological:  Negative for dizziness, syncope, speech difficulty, weakness, light-headedness and headaches. Hematological:  Does not bruise/bleed easily. Psychiatric/Behavioral:  Negative for self-injury and suicidal ideas. All other systems reviewed and are negative.     Past Medical History:   Diagnosis Date    Acute renal failure ([address] 2010    Cancer ([address]     cervical    COPD (chronic obstructive pulmonary disease) ([address] 2010    Endocrine disease     hypothyroid    Hypertension     managed with medicaitons    Other ill-defined conditions(799.89)     chronic back pain, migraines    Psychiatric disorder     anxiety    Seizure disorder ([address] 2010    Seizures ([address]     Estle Mora 10/23/2010        Past Surgical History:   Procedure Laterality Date    APPENDECTOMY      CHOLECYSTECTOMY      DILATION AND CURETTAGE OF UTERUS      HERNIA REPAIR  2017    HYSTERECTOMY (CERVIX STATUS UNKNOWN)          Family History   Problem Relation Age of Onset    Heart Attack Father          age 46    Heart Disease Father     Seizures Mother         Social History Socioeconomic History    Marital status: Single     Spouse name: None    Number of children: None    Years of education: None    Highest education level: None   Tobacco Use    Smoking status: Every Day     Packs/day: 1.00     Types: Cigarettes    Smokeless tobacco: Never   Substance and Sexual Activity    Alcohol use: No    Drug use: No   Social History Narrative    ** Data from: 9/16/10 Enc Dept: [facility] 3 INTENSIVE CARE         ** Merged History Encounter **    Lives with her boyfriend Auncoby Cortes and her son         ** Data from: 10/20/10 Enc Dept: [facility] EMERGENCY DEPT         Iodine, Penicillins, Meloxicam, and Tramadol     Discharge Medication List as of 2/22/2023  4:01 PM        CONTINUE these medications which have NOT CHANGED    Details   ipratropium-albuterol (DUONEB) 0.5-2.5 (3) MG/3ML SOLN nebulizer solution Inhale 3 mLs into the lungs every 6 hours as needed for Shortness of Breath, Disp-120 each, R-0Normal      budesonide (PULMICORT) 0.5 MG/2ML nebulizer suspension Take 2 mLs by nebulization 2 times daily for 4 days, Disp-16 mL, R-0Normal      fluticasone-salmeterol (ADVAIR DISKUS) 100-50 MCG/ACT AEPB diskus inhaler Inhale 1 puff into the lungs in the morning and 1 puff in the evening., Disp-1 each, R-0Normal      albuterol sulfate HFA (PROVENTIL;VENTOLIN;PROAIR) 108 (90 Base) MCG/ACT inhaler Inhale 2 puffs into the lungs every 6 hours as neededHistorical Med      carvedilol (COREG) 12.5 MG tablet Take 12.5 mg by mouth every eveningHistorical Med      clonazePAM (KLONOPIN) 1 MG tablet Take by mouth every 4 hours. Historical Med      divalproex (DEPAKOTE ER) 250 MG extended release tablet Take 500 mg by mouth 2 times dailyHistorical Med      gabapentin (NEURONTIN) 100 MG capsule Take 800 mg by mouth in the morning and 800 mg in the evening. Historical Med      phenytoin (DILANTIN) 100 MG ER capsule Take 300 mg by mouth 2 times dailyHistorical Med      traZODone (DESYREL) 100 MG tablet Take 100 mg by mouthHistorical Med              Vitals signs and nursing note reviewed. Patient Vitals for the past 4 hrs:   Temp Pulse Resp BP SpO2   02/22/23 1530 -- -- -- 120/87 --   02/22/23 1517 98.2 °F (36.8 °C) 63 18 (!) 179/152 94 %          Physical Exam  Vitals and nursing note reviewed. Constitutional:       General: She is not in acute distress. Appearance: Normal appearance. She is not ill-appearing, toxic-appearing or diaphoretic. Comments: Overall well-appearing and nontoxic. Appears older than stated age. Walks with antalgic gait favoring right side. Pleasant and cooperative. Alert and oriented. Normal mentation. Speaks in full sentences. Even nonlabored respirations. HENT:      Head: Normocephalic and atraumatic. Right Ear: External ear normal.      Left Ear: External ear normal.      Nose: Nose normal.      Mouth/Throat:      Mouth: Mucous membranes are moist.   Eyes:      General: No scleral icterus. Right eye: No discharge. Left eye: No discharge. Conjunctiva/sclera: Conjunctivae normal.   Cardiovascular:      Rate and Rhythm: Normal rate and regular rhythm. Pulses: Normal pulses. Heart sounds: Normal heart sounds. Pulmonary:      Effort: Pulmonary effort is normal.      Breath sounds: Normal breath sounds. Musculoskeletal:         General: Normal range of motion. Cervical back: Normal range of motion and neck supple. Legs:       Comments: Mild medial joint line tenderness as well as tenderness over the pes anserine. No erythema or warmth. There is minimal swelling of the right knee compared to the left knee. There is full range of motion of the right knee with mild pain. Able to bear weight without difficulty. No calf tenderness or swelling or erythema or thigh tenderness or swelling or erythema or signs of DVT. Neurovascularly intact bilaterally. No warmth or signs of infection. Skin:     General: Skin is warm and dry.       Capillary Refill: Capillary refill takes less than 2 seconds. Coloration: Skin is not jaundiced. Neurological:      General: No focal deficit present. Mental Status: She is alert and oriented to person, place, and time. Mental status is at baseline. Procedures    Results for orders placed or performed during the hospital encounter of 02/22/23   XR KNEE RIGHT (3 VIEWS)    Narrative    XR KNEE RIGHT (3 VIEWS) 2/22/2023 3:37 PM    HISTORY: Right knee pain and swelling over the past week. COMPARISON: None available. Three views of the right knee were obtained      Impression    There is no evidence of fracture or other acute bony abnormality. No  bony lesions are seen. Small suprapatellar effusion is present. Mild  degenerative patellofemoral joint changes are noted. XR KNEE RIGHT (3 VIEWS)   Final Result   There is no evidence of fracture or other acute bony abnormality. No   bony lesions are seen. Small suprapatellar effusion is present. Mild   degenerative patellofemoral joint changes are noted. Voice dictation software was used during the making of this note. This software is not perfect and grammatical and other typographical errors may be present. This note has not been completely proofread for errors.      [place], [place]  02/22/23 0659

## 2023-02-22 NOTE — DISCHARGE INSTRUCTIONS
I suspect you have strained your knee from overuse and compensation from your left ankle pain. You may utilize Ace bandage that we have given you for support. You may also use over-the-counter knee brace to help you. I have placed a referral to a physical therapist for you. I feel this will benefit you the most.  They can work on both your ankle and your right knee. They should call you in the next few days to set up your appointment. You may also call the number above to get an appointment set up. We have given you naproxen per your request.  This is an anti-inflammatory pain medication. Do not combine this medication with other NSAIDs such as ibuprofen or Advil. As we discussed, I did not find a life threatening cause of your symptoms today. However, THAT DOES NOT MEAN IT COULD NOT DEVELOP. If you develop ANY new or worsening symptoms, it is critical that you return for re-evaluation. This includes any symptoms that are concerning to you, especially symptoms such as fevers, redness, inability to bear weight, numbness, paresthesias, calf pain or swelling, loss of range of motion. If you do not return for re-evaluation, you risk serious complications, including death.

## 2023-12-28 ENCOUNTER — APPOINTMENT (OUTPATIENT)
Dept: GENERAL RADIOLOGY | Age: 53
End: 2023-12-28
Payer: MEDICARE

## 2023-12-28 ENCOUNTER — HOSPITAL ENCOUNTER (EMERGENCY)
Age: 53
Discharge: LEFT BEFORE DISCHARGE | End: 2023-12-28
Payer: MEDICARE

## 2023-12-28 VITALS
HEIGHT: 67 IN | TEMPERATURE: 98.1 F | WEIGHT: 210 LBS | OXYGEN SATURATION: 94 % | SYSTOLIC BLOOD PRESSURE: 163 MMHG | DIASTOLIC BLOOD PRESSURE: 97 MMHG | HEART RATE: 74 BPM | BODY MASS INDEX: 32.96 KG/M2 | RESPIRATION RATE: 21 BRPM

## 2023-12-28 LAB
ANION GAP SERPL CALC-SCNC: 7 MMOL/L (ref 2–11)
BASOPHILS # BLD: 0.1 K/UL (ref 0–0.2)
BASOPHILS NFR BLD: 1 % (ref 0–2)
BUN SERPL-MCNC: 23 MG/DL (ref 6–23)
CALCIUM SERPL-MCNC: 9.2 MG/DL (ref 8.3–10.4)
CHLORIDE SERPL-SCNC: 101 MMOL/L (ref 103–113)
CO2 SERPL-SCNC: 28 MMOL/L (ref 21–32)
CREAT SERPL-MCNC: 0.9 MG/DL (ref 0.6–1)
DIFFERENTIAL METHOD BLD: ABNORMAL
EKG ATRIAL RATE: 71 BPM
EKG DIAGNOSIS: NORMAL
EKG P AXIS: 54 DEGREES
EKG P-R INTERVAL: 162 MS
EKG Q-T INTERVAL: 432 MS
EKG QRS DURATION: 118 MS
EKG QTC CALCULATION (BAZETT): 469 MS
EKG R AXIS: 147 DEGREES
EKG T AXIS: 65 DEGREES
EKG VENTRICULAR RATE: 71 BPM
EOSINOPHIL # BLD: 0.3 K/UL (ref 0–0.8)
EOSINOPHIL NFR BLD: 3 % (ref 0.5–7.8)
ERYTHROCYTE [DISTWIDTH] IN BLOOD BY AUTOMATED COUNT: 14.8 % (ref 11.9–14.6)
GLUCOSE SERPL-MCNC: 172 MG/DL (ref 65–100)
HCT VFR BLD AUTO: 43.7 % (ref 35.8–46.3)
HGB BLD-MCNC: 14.1 G/DL (ref 11.7–15.4)
IMM GRANULOCYTES # BLD AUTO: 0 K/UL (ref 0–0.5)
IMM GRANULOCYTES NFR BLD AUTO: 0 % (ref 0–5)
LYMPHOCYTES # BLD: 3.3 K/UL (ref 0.5–4.6)
LYMPHOCYTES NFR BLD: 34 % (ref 13–44)
MCH RBC QN AUTO: 30.2 PG (ref 26.1–32.9)
MCHC RBC AUTO-ENTMCNC: 32.3 G/DL (ref 31.4–35)
MCV RBC AUTO: 93.6 FL (ref 82–102)
MONOCYTES # BLD: 1 K/UL (ref 0.1–1.3)
MONOCYTES NFR BLD: 10 % (ref 4–12)
NEUTS SEG # BLD: 4.9 K/UL (ref 1.7–8.2)
NEUTS SEG NFR BLD: 52 % (ref 43–78)
NRBC # BLD: 0 K/UL (ref 0–0.2)
PLATELET # BLD AUTO: 263 K/UL (ref 150–450)
PMV BLD AUTO: 9.1 FL (ref 9.4–12.3)
POTASSIUM SERPL-SCNC: 3.9 MMOL/L (ref 3.5–5.1)
RBC # BLD AUTO: 4.67 M/UL (ref 4.05–5.2)
SODIUM SERPL-SCNC: 136 MMOL/L (ref 136–146)
TROPONIN I SERPL HS-MCNC: 11.1 PG/ML (ref 0–14)
WBC # BLD AUTO: 9.6 K/UL (ref 4.3–11.1)

## 2023-12-28 PROCEDURE — 71046 X-RAY EXAM CHEST 2 VIEWS: CPT

## 2023-12-28 PROCEDURE — 85025 COMPLETE CBC W/AUTO DIFF WBC: CPT

## 2023-12-28 PROCEDURE — 93010 ELECTROCARDIOGRAM REPORT: CPT | Performed by: INTERNAL MEDICINE

## 2023-12-28 PROCEDURE — 80048 BASIC METABOLIC PNL TOTAL CA: CPT

## 2023-12-28 PROCEDURE — 84484 ASSAY OF TROPONIN QUANT: CPT

## 2023-12-28 PROCEDURE — 93005 ELECTROCARDIOGRAM TRACING: CPT | Performed by: EMERGENCY MEDICINE

## 2023-12-28 PROCEDURE — 4500000002 HC ER NO CHARGE

## 2023-12-28 ASSESSMENT — PAIN SCALES - GENERAL: PAINLEVEL_OUTOF10: 9

## 2023-12-28 ASSESSMENT — LIFESTYLE VARIABLES
HOW OFTEN DO YOU HAVE A DRINK CONTAINING ALCOHOL: NEVER
HOW MANY STANDARD DRINKS CONTAINING ALCOHOL DO YOU HAVE ON A TYPICAL DAY: PATIENT DOES NOT DRINK

## 2023-12-28 ASSESSMENT — PAIN - FUNCTIONAL ASSESSMENT: PAIN_FUNCTIONAL_ASSESSMENT: 0-10

## 2023-12-28 NOTE — ED TRIAGE NOTES
Pt to ED with c/o chest pressure, pain to left arm, and shortness of breath. Pt states started around 1pm yesterday. Pt states feels like someone sitting on her chest. Pt states cough but no worse than her normal. Pt states pain constant. Pt alert ambulatory and in no acute distress at this time.

## 2024-02-05 ENCOUNTER — OFFICE VISIT (OUTPATIENT)
Age: 54
End: 2024-02-05
Payer: MEDICARE

## 2024-02-05 VITALS
HEART RATE: 84 BPM | BODY MASS INDEX: 40.18 KG/M2 | SYSTOLIC BLOOD PRESSURE: 124 MMHG | HEIGHT: 67 IN | DIASTOLIC BLOOD PRESSURE: 80 MMHG | WEIGHT: 256 LBS

## 2024-02-05 DIAGNOSIS — I51.7 CARDIOMEGALY: Primary | ICD-10-CM

## 2024-02-05 DIAGNOSIS — I10 PRIMARY HYPERTENSION: ICD-10-CM

## 2024-02-05 PROCEDURE — 3074F SYST BP LT 130 MM HG: CPT | Performed by: INTERNAL MEDICINE

## 2024-02-05 PROCEDURE — 4004F PT TOBACCO SCREEN RCVD TLK: CPT | Performed by: INTERNAL MEDICINE

## 2024-02-05 PROCEDURE — 99204 OFFICE O/P NEW MOD 45 MIN: CPT | Performed by: INTERNAL MEDICINE

## 2024-02-05 PROCEDURE — 3017F COLORECTAL CA SCREEN DOC REV: CPT | Performed by: INTERNAL MEDICINE

## 2024-02-05 PROCEDURE — G8428 CUR MEDS NOT DOCUMENT: HCPCS | Performed by: INTERNAL MEDICINE

## 2024-02-05 PROCEDURE — 3079F DIAST BP 80-89 MM HG: CPT | Performed by: INTERNAL MEDICINE

## 2024-02-05 PROCEDURE — G8484 FLU IMMUNIZE NO ADMIN: HCPCS | Performed by: INTERNAL MEDICINE

## 2024-02-05 PROCEDURE — G8417 CALC BMI ABV UP PARAM F/U: HCPCS | Performed by: INTERNAL MEDICINE

## 2024-02-05 RX ORDER — FUROSEMIDE 40 MG/1
40 TABLET ORAL 2 TIMES DAILY
COMMUNITY

## 2024-02-05 ASSESSMENT — ENCOUNTER SYMPTOMS
ABDOMINAL PAIN: 0
ORTHOPNEA: 0
SHORTNESS OF BREATH: 0

## 2024-02-05 NOTE — PROGRESS NOTES
[facility] C[facility]IOLOG[facility]  [address] [place] [address], [facility] [address]  [place] [address]  PHONE: [phone]      24    NAME:  Bre Bautista  : 1970  MRN: [id number]         SUBJECTIVE:   Bre Bautista is a 53 y.o. female seen for a consultation visit regarding the following:     Chief Complaint   Patient presents with    Consultation     Hospital follow up             HPI:  Consultation is requested by Unknown, Provider, DO for evaluation of Consultation (Hospital follow up )   .    Ms. Bautista presents today for follow-up.  Patient was seen in the ER 2 weeks ago with lower extremity swelling and shortness of breath.  Chest x-ray concerning for pulmonary edema.  Her BNP was mildly elevated.  She was instructed to follow-up with cardiologist and she was started on Lasix.  She states that since starting Lasix therapy, her edema is improving.  She notes she takes in the morning and typically stops working in the afternoon.  She has no prior history of heart disease.  She had a cardiac workup 10 to 12 years ago which was benign at the time.  She unfortunately continues to smoke.  She also complains of some chest discomfort, substernal with some radiation to the back.  Nonexertional.  She is on medicine for high blood pressure but no diabetes or high cholesterol.  She is on multiple inhalers for COPD        Key CAD CHF Meds            furosemide (LASIX) 40 MG tablet (Taking)    Class: Historical Med    carvedilol (COREG) 12.5 MG tablet (Taking)    Class: Historical Med          Key Antihyperglycemic Medications       Patient is on no antihyperglycemic meds.              Past Medical History, Past Surgical History, Family history, Social History, and Medications were all reviewed with the patient today and updated as necessary.     Prior to Admission medications    Medication Sig Start Date End Date Taking? Authorizing Provider   furosemide (LASIX) 40 MG tablet Take 1 tablet by mouth in the morning and 1

## 2024-04-12 ENCOUNTER — TELEPHONE (OUTPATIENT)
Age: 54
End: 2024-04-12

## 2024-04-12 NOTE — TELEPHONE ENCOUNTER
----- Message from Himanshu Barth III, MD sent at 4/12/2024  8:35 AM EDT -----  Please let patient know that there echo showed some abnormalities I would like to discuss with them in the office. Please schedule for follow-up appointment in the next few weeks.

## 2024-04-15 ENCOUNTER — TELEPHONE (OUTPATIENT)
Age: 54
End: 2024-04-15

## 2024-04-16 ENCOUNTER — TELEPHONE (OUTPATIENT)
Age: 54
End: 2024-04-16

## 2024-04-16 RX ORDER — LOSARTAN POTASSIUM 50 MG/1
50 TABLET ORAL DAILY
Qty: 30 TABLET | Refills: 1 | Status: SHIPPED | OUTPATIENT
Start: 2024-04-16

## 2024-04-16 NOTE — TELEPHONE ENCOUNTER
Her echo showed evidence of hypertensive heart disease.  Her ejection fraction was normal.  We need to do a better job to control blood pressure.  Lets add losartan 50 once a day.  Set her follow-up in the next 3 to 4 weeks.  Thanks   Patient called with results of echo and scheduled appointment 5-10-24 10 am MA. Prescription sent to [facility] per patient//nirmal  Requested Prescriptions     Signed Prescriptions Disp Refills    losartan (COZAAR) 50 MG tablet 30 tablet 1     Sig: Take 1 tablet by mouth daily     Authorizing Provider: KATIE BLAIR III     Ordering User: DANIA GREENWOOD

## 2024-04-16 NOTE — TELEPHONE ENCOUNTER
Patient called wanting results of echo    Also blood pressure has been high.   This morning 201/101.   Other readings from 130-174/    Patient is taking Coreg 12.5 mg bid  Lasix 40 mg bid    Last office visit 2-5-24  Echo 4-11-24

## 2024-04-22 ENCOUNTER — TELEPHONE (OUTPATIENT)
Age: 54
End: 2024-04-22

## 2024-04-22 NOTE — TELEPHONE ENCOUNTER
Patient returning a call from Friday, she had a voicemail. She had an echo and some BP meds were call in to addition of what she was taking.  The message left to her Friday was to call back.

## 2024-05-07 ENCOUNTER — TELEPHONE (OUTPATIENT)
Age: 54
End: 2024-05-07

## 2024-05-07 NOTE — TELEPHONE ENCOUNTER
Pt states she awoke at 0300 with symptoms of chest pressure that radiated to her back and down her left arm.  BP was elevated 205/101  Took 4, 81mg Aspirin and BP medications.  CP relieved. No recurrence.    BP was 154/88 at 8:30 am  C/o headache now, relieved somewhat with percocet this morning.  Does not have a way to check BP at this time.  Has f/u appt 5/10/24 with Dr. Barth.     Encouraged pt to minimize sodium, nicotine, and caffeine, avoid strenuous activity. If CP recurs and is not relieved with several minutes of rest or she develops other worrisome symptoms, proceed to D ER for evaluation. Pt verbalizes understanding to all and agrees to plan.

## 2024-05-07 NOTE — TELEPHONE ENCOUNTER
Patient called stating she is experiencing the following symptoms :    Intermittent CP  Pressure in chest @ 3:00 AM  Pain radiating into left arm @ 3:00 AM  GI=642/101 @ 3:00 AM  Took 4 81 MG ASA and symptoms dissipated  Took BP med this AM and IQ=607/83

## 2024-05-10 ENCOUNTER — OFFICE VISIT (OUTPATIENT)
Age: 54
End: 2024-05-10

## 2024-05-10 VITALS
BODY MASS INDEX: 39.07 KG/M2 | WEIGHT: 248.9 LBS | HEIGHT: 67 IN | DIASTOLIC BLOOD PRESSURE: 82 MMHG | HEART RATE: 88 BPM | SYSTOLIC BLOOD PRESSURE: 122 MMHG

## 2024-05-10 DIAGNOSIS — R06.09 DOE (DYSPNEA ON EXERTION): Primary | ICD-10-CM

## 2024-05-10 DIAGNOSIS — R07.2 PRECORDIAL PAIN: ICD-10-CM

## 2024-05-10 RX ORDER — BUDESONIDE, GLYCOPYRROLATE, AND FORMOTEROL FUMARATE 160; 9; 4.8 UG/1; UG/1; UG/1
AEROSOL, METERED RESPIRATORY (INHALATION)
COMMUNITY
Start: 2024-05-02

## 2024-05-10 RX ORDER — ROFLUMILAST 500 UG/1
TABLET ORAL
COMMUNITY
Start: 2024-04-25

## 2024-05-10 RX ORDER — GABAPENTIN 800 MG/1
TABLET ORAL
COMMUNITY
Start: 2024-04-15

## 2024-05-10 RX ORDER — GALCANEZUMAB 120 MG/ML
INJECTION, SOLUTION SUBCUTANEOUS
COMMUNITY
Start: 2023-08-01

## 2024-05-10 RX ORDER — PANTOPRAZOLE SODIUM 40 MG/1
TABLET, DELAYED RELEASE ORAL
COMMUNITY

## 2024-05-10 RX ORDER — MONTELUKAST SODIUM 10 MG/1
TABLET ORAL
COMMUNITY

## 2024-05-10 RX ORDER — DOXYCYCLINE HYCLATE 100 MG/1
CAPSULE ORAL
COMMUNITY
Start: 2024-05-09

## 2024-05-10 RX ORDER — DIVALPROEX SODIUM 500 MG/1
TABLET, EXTENDED RELEASE ORAL
COMMUNITY
Start: 2024-05-08

## 2024-05-10 RX ORDER — ATORVASTATIN CALCIUM 10 MG/1
TABLET, FILM COATED ORAL
COMMUNITY
Start: 2024-03-21

## 2024-05-10 RX ORDER — OXYCODONE HYDROCHLORIDE AND ACETAMINOPHEN 5; 325 MG/1; MG/1
TABLET ORAL
COMMUNITY
Start: 2024-04-10

## 2024-05-10 ASSESSMENT — ENCOUNTER SYMPTOMS
ABDOMINAL PAIN: 0
SHORTNESS OF BREATH: 0

## 2024-05-10 NOTE — PROGRESS NOTES
[facility] CARDIOLOGY  [address] [place] [address], [address]  [place] [address]  PHONE: [phone]      05/10/24    NAME:  Bre Bautista  : 1970  MRN: [id number]         SUBJECTIVE:   Bre Bautista is a 53 y.o. female seen for a follow up visit regarding the following:     Chief Complaint   Patient presents with    Hypertension            HPI:  Follow up  Hypertension   .    Ms. Bautista presents today for follow-up.  We reviewed her echo which showed normal LV systolic function but moderate LVH, abnormal diastolic function and elevated RVSP.  She reports some continued episodes of chest pain, these frequently wake her up from sleep.  She also noted some lability of her blood pressure.  It is normal today though.  No new complaints today    Hypertension  Pertinent negatives include no shortness of breath.           Cardiac Medications       Loop Diuretics       furosemide (LASIX) 40 MG tablet Take 1 tablet by mouth daily       HMG CoA Reductase Inhibitors       atorvastatin (LIPITOR) 10 MG tablet TAKE 1 TABLET DAILY TO LOWER CHOLESTEROL       Alpha-Beta Blockers       carvedilol (COREG) 12.5 MG tablet Take 1 tablet by mouth every evening       Angiotensin II Receptor Antagonists       losartan (COZAAR) 50 MG tablet Take 1 tablet by mouth daily                  Past Medical History, Past Surgical History, Family history, Social History, and Medications were all reviewed with the patient today and updated as necessary.     Prior to Admission medications    Medication Sig Start Date End Date Taking? Authorizing Provider   atorvastatin (LIPITOR) 10 MG tablet TAKE 1 TABLET DAILY TO LOWER CHOLESTEROL 3/21/24  Yes ProviderArun MD BREZTRI AEROSPHERE 160-9-4.8 MCG/ACT AERO  24  Yes ProviderArun MD   vitamin D (CHOLECALCIFEROL) 50 MCG (2000) CAPS capsule Take 1 capsule every day by oral route. 10/18/23  Yes ProviderArun MD   doxycycline hyclate (VIBRAMYCIN) 100 MG capsule

## 2024-05-13 RX ORDER — LOSARTAN POTASSIUM 50 MG/1
50 TABLET ORAL DAILY
Qty: 90 TABLET | Refills: 3 | Status: SHIPPED | OUTPATIENT
Start: 2024-05-13

## 2024-05-13 NOTE — TELEPHONE ENCOUNTER
Requested Prescriptions     Pending Prescriptions Disp Refills    losartan (COZAAR) 50 MG tablet 90 tablet 3     Sig: Take 1 tablet by mouth daily

## 2024-05-31 ENCOUNTER — TELEPHONE (OUTPATIENT)
Age: 54
End: 2024-05-31

## 2024-05-31 NOTE — TELEPHONE ENCOUNTER
Pt called with c/o intermitted CP    Last night CP   Pain: Squeezing, pt stated (it felt like something is sitting on my chest)  Rated:10 from 0-10  Sx:SOB, CP,diaphoresis, dizziness HA  BP:196/94  HR:69      Currently  Pain Dull ache  Rated: 5 from 0-10  SX:dizziness, HA,CP  BP:181/96  HR:71      Using critical judgment, nurse advise pt to go to the ER for further evaluation.     Pt v/u

## 2024-05-31 NOTE — TELEPHONE ENCOUNTER
Patient called stating she is experiencing the following :    Intermittent CP  Radiating into back  SOB  Left arm pain

## 2024-06-17 ENCOUNTER — TELEPHONE (OUTPATIENT)
Age: 54
End: 2024-06-17

## 2024-06-17 ENCOUNTER — OFFICE VISIT (OUTPATIENT)
Age: 54
End: 2024-06-17
Payer: MEDICARE

## 2024-06-17 VITALS
HEIGHT: 67 IN | HEART RATE: 76 BPM | WEIGHT: 250.9 LBS | BODY MASS INDEX: 39.38 KG/M2 | SYSTOLIC BLOOD PRESSURE: 128 MMHG | DIASTOLIC BLOOD PRESSURE: 88 MMHG

## 2024-06-17 DIAGNOSIS — R06.09 DOE (DYSPNEA ON EXERTION): Primary | ICD-10-CM

## 2024-06-17 DIAGNOSIS — I10 PRIMARY HYPERTENSION: ICD-10-CM

## 2024-06-17 PROCEDURE — 4004F PT TOBACCO SCREEN RCVD TLK: CPT | Performed by: INTERNAL MEDICINE

## 2024-06-17 PROCEDURE — 3079F DIAST BP 80-89 MM HG: CPT | Performed by: INTERNAL MEDICINE

## 2024-06-17 PROCEDURE — G8417 CALC BMI ABV UP PARAM F/U: HCPCS | Performed by: INTERNAL MEDICINE

## 2024-06-17 PROCEDURE — 3017F COLORECTAL CA SCREEN DOC REV: CPT | Performed by: INTERNAL MEDICINE

## 2024-06-17 PROCEDURE — 3074F SYST BP LT 130 MM HG: CPT | Performed by: INTERNAL MEDICINE

## 2024-06-17 PROCEDURE — 99214 OFFICE O/P EST MOD 30 MIN: CPT | Performed by: INTERNAL MEDICINE

## 2024-06-17 PROCEDURE — G8427 DOCREV CUR MEDS BY ELIG CLIN: HCPCS | Performed by: INTERNAL MEDICINE

## 2024-06-17 RX ORDER — SPIRONOLACTONE 25 MG/1
25 TABLET ORAL DAILY
Qty: 30 TABLET | Refills: 5 | Status: SHIPPED | OUTPATIENT
Start: 2024-06-17

## 2024-06-17 RX ORDER — SPIRONOLACTONE 25 MG/1
25 TABLET ORAL DAILY
Qty: 30 TABLET | Refills: 5 | Status: SHIPPED | OUTPATIENT
Start: 2024-06-17 | End: 2024-06-17

## 2024-06-17 ASSESSMENT — ENCOUNTER SYMPTOMS
SHORTNESS OF BREATH: 1
ABDOMINAL PAIN: 0

## 2024-06-17 NOTE — TELEPHONE ENCOUNTER
was to call in a new medication ( Patient was seen today ) to [facility] and nothing has been called in. Please call in and call patient when called in.

## 2024-06-17 NOTE — PROGRESS NOTES
Neurological:  Negative for focal weakness.   Psychiatric/Behavioral:  Negative for suicidal ideas.           PHYSICAL EXAM:   /88   Pulse 76   Ht 1.702 m (5' 7\")   Wt 113.8 kg (250 lb 14.4 oz) Comment: with shoes  BMI 39.30 kg/m²      Wt Readings from Last 3 Encounters:   06/17/24 113.8 kg (250 lb 14.4 oz)   05/10/24 112.9 kg (248 lb 14.4 oz)   04/11/24 116.1 kg (256 lb)     BP Readings from Last 3 Encounters:   06/17/24 128/88   05/10/24 122/82   04/11/24 (!) 140/90     Pulse Readings from Last 3 Encounters:   06/17/24 76   05/10/24 88   02/05/24 84           Physical Exam  Vitals reviewed.   Constitutional:       Appearance: Normal appearance.      Comments: Appears younger than stated age   HENT:      Head: Normocephalic and atraumatic.   Eyes:      General: No scleral icterus.  Neck:      Vascular: No carotid bruit.   Cardiovascular:      Rate and Rhythm: Normal rate and regular rhythm.      Heart sounds: No murmur heard.     No gallop.   Pulmonary:      Breath sounds: Normal breath sounds.   Abdominal:      Palpations: Abdomen is soft.   Musculoskeletal:      Cervical back: Neck supple.      Right lower leg: Edema present.      Left lower leg: Edema present.   Skin:     General: Skin is warm and dry.   Neurological:      Mental Status: She is alert and oriented to person, place, and time.   Psychiatric:         Mood and Affect: Mood normal.         Medical problems and test results were reviewed with the patient today.     DATA REVIEW    LIPID PANEL     No results found for: \"CHOL\"  No results found for: \"TRIG\"  No results found for: \"HDL\"  No components found for: \"LDLCHOLESTEROL\", \"LDLCALC\"  No results found for: \"VLDL\"  No results found for: \"CHOLHDLRATIO\"    [facility]  Lab Results   Component Value Date/Time     12/28/2023 12:12 AM    K 3.9 12/28/2023 12:12 AM     12/28/2023 12:12 AM    CO2 28 12/28/2023 12:12 AM    BUN 23 12/28/2023 12:12 AM    CREATININE 0.90 12/28/2023 12:12 AM

## 2024-07-01 ENCOUNTER — TELEPHONE (OUTPATIENT)
Age: 54
End: 2024-07-01

## 2024-07-05 NOTE — TELEPHONE ENCOUNTER
Informed patient of results, \"  Normal stress. No evidence of blackages  wgw     Attempted to tell me something about her PCP but was having phone issues. She is scheduled to see Dr. Barth on Tuesday.

## 2024-09-07 ENCOUNTER — HOSPITAL ENCOUNTER (EMERGENCY)
Age: 54
Discharge: HOME | End: 2024-09-07
Attending: EMERGENCY MEDICINE
Payer: MEDICARE

## 2024-09-07 VITALS
OXYGEN SATURATION: 92 % | TEMPERATURE: 97.7 F | DIASTOLIC BLOOD PRESSURE: 75 MMHG | HEART RATE: 74 BPM | SYSTOLIC BLOOD PRESSURE: 102 MMHG | RESPIRATION RATE: 20 BRPM

## 2024-09-07 DIAGNOSIS — R51.9 ACUTE NONINTRACTABLE HEADACHE, UNSPECIFIED HEADACHE TYPE: Primary | ICD-10-CM

## 2024-09-07 PROCEDURE — 96375 TX/PRO/DX INJ NEW DRUG ADDON: CPT

## 2024-09-07 PROCEDURE — 6360000002 HC RX W HCPCS: Performed by: EMERGENCY MEDICINE

## 2024-09-07 PROCEDURE — 6370000000 HC RX 637 (ALT 250 FOR IP): Performed by: EMERGENCY MEDICINE

## 2024-09-07 PROCEDURE — 99284 EMERGENCY DEPT VISIT MOD MDM: CPT

## 2024-09-07 PROCEDURE — 96374 THER/PROPH/DIAG INJ IV PUSH: CPT

## 2024-09-07 RX ORDER — DIPHENHYDRAMINE HYDROCHLORIDE 50 MG/ML
25 INJECTION INTRAMUSCULAR; INTRAVENOUS ONCE
Status: COMPLETED | OUTPATIENT
Start: 2024-09-07 | End: 2024-09-07

## 2024-09-07 RX ORDER — PROCHLORPERAZINE EDISYLATE 5 MG/ML
10 INJECTION INTRAMUSCULAR; INTRAVENOUS ONCE
Status: COMPLETED | OUTPATIENT
Start: 2024-09-07 | End: 2024-09-07

## 2024-09-07 RX ORDER — MAGNESIUM SULFATE IN WATER 40 MG/ML
2000 INJECTION, SOLUTION INTRAVENOUS ONCE
Status: DISCONTINUED | OUTPATIENT
Start: 2024-09-07 | End: 2024-09-07

## 2024-09-07 RX ORDER — DEXAMETHASONE SODIUM PHOSPHATE 10 MG/ML
10 INJECTION INTRAMUSCULAR; INTRAVENOUS ONCE
Status: COMPLETED | OUTPATIENT
Start: 2024-09-07 | End: 2024-09-07

## 2024-09-07 RX ORDER — 0.9 % SODIUM CHLORIDE 0.9 %
500 INTRAVENOUS SOLUTION INTRAVENOUS
Status: DISCONTINUED | OUTPATIENT
Start: 2024-09-07 | End: 2024-09-07

## 2024-09-07 RX ORDER — ACETAMINOPHEN 500 MG
1000 TABLET ORAL ONCE
Status: COMPLETED | OUTPATIENT
Start: 2024-09-07 | End: 2024-09-07

## 2024-09-07 RX ORDER — 0.9 % SODIUM CHLORIDE 0.9 %
1000 INTRAVENOUS SOLUTION INTRAVENOUS
Status: DISCONTINUED | OUTPATIENT
Start: 2024-09-07 | End: 2024-09-07

## 2024-09-07 RX ADMIN — DEXAMETHASONE SODIUM PHOSPHATE 10 MG: 10 INJECTION INTRAMUSCULAR; INTRAVENOUS at 13:16

## 2024-09-07 RX ADMIN — PROCHLORPERAZINE EDISYLATE 10 MG: 5 INJECTION INTRAMUSCULAR; INTRAVENOUS at 13:20

## 2024-09-07 RX ADMIN — DIPHENHYDRAMINE HYDROCHLORIDE 25 MG: 50 INJECTION INTRAMUSCULAR; INTRAVENOUS at 13:18

## 2024-09-07 RX ADMIN — ACETAMINOPHEN 1000 MG: 500 TABLET, FILM COATED ORAL at 13:15

## 2024-09-07 ASSESSMENT — PAIN DESCRIPTION - LOCATION
LOCATION: HEAD
LOCATION: HEAD

## 2024-09-07 ASSESSMENT — PAIN SCALES - GENERAL
PAINLEVEL_OUTOF10: 10
PAINLEVEL_OUTOF10: 5

## 2024-09-07 ASSESSMENT — PAIN DESCRIPTION - DESCRIPTORS: DESCRIPTORS: ACHING

## 2024-09-07 ASSESSMENT — LIFESTYLE VARIABLES
HOW MANY STANDARD DRINKS CONTAINING ALCOHOL DO YOU HAVE ON A TYPICAL DAY: PATIENT DOES NOT DRINK
HOW OFTEN DO YOU HAVE A DRINK CONTAINING ALCOHOL: NEVER

## 2024-09-07 NOTE — ED NOTES
Report given to CORTNEY Rudolph and care assumed at this time.      Nasrin Roy RN  09/07/24 6825

## 2024-09-07 NOTE — ED TRIAGE NOTES
[facility] complaint of a migraine since last night. Pt states she took an imitrex shot last night.

## 2024-09-07 NOTE — ED PROVIDER NOTES
Emergency Department Provider Note                   PCP:                Stan Vázquez MD               Age: 54 y.o.      Sex: female   Final diagnosis/impression:  1. Acute nonintractable headache, unspecified headache type       Disposition: Discharged    MDM/Clinical Course:  Patient seen by myself at the [facility] emergency department. Patient had signs symptoms and clinical history most consistent with migraine headache type symptoms.  Considered labs however patient without any focal deficits, labs would not necessarily  and do not feel patient with abnormal electrolytes, abnormal kidney function or other emergent abnormality. While under my care, patient received IV Compazine, IV Benadryl, p.o. Tylenol, IV Decadron, IV magnesium, IV fluids.  On reassessment, patient with significant improvement in symptoms.  Patient feels appropriate for discharge.  Recommended rest, hydration, close monitoring of symptoms, low threshold to return as needed.  Patient/family given instructions to return as needed for any questions, concerns or worsening symptoms, particularly those as outlined in the disposition section / discharge section of patient discharge paperwork. Patient/family verbalizes understanding and agreement with ED course/plan in shared medical decision making. Questions answered.    Complexity of Problems Addressed:  1 or more acute illnesses that pose a threat to life or bodily function.     Data Reviewed and Analyzed:    Category 1:   I reviewed external records: Reviewed/11/24 echocardiogram showing EF of 55 to 60%, abnormal diastolic function, moderate, concentric hypertrophy  I ordered each unique test.  I reviewed the results of each unique test.    Category 2:     Category 3: Discussion of management or test interpretation.  See MDM / clinical course section above for details    Risk of Complications and/or Morbidity of Patient Management:  Over the counter drug  management performed.  Prescription drug management performed.  Drug therapy given requiring intensive monitoring for toxicity.  Chronic medical problems impacting care include migraine headaches.  Shared medical decision making was utilized in creating the patients health plan today.  Considerations: The following items were considered but not ordered: Considered laboratory testing such as CBC, BMP, considered CT noncontrast however, however ultimately do not feel patient would benefit from these tests and in shared medical decision making we will hold off at this time.  _____    Orders/Meds:    Orders Placed This Encounter   Procedures    Saline lock IV      ED Meds Given:  Medications   prochlorperazine (COMPAZINE) injection 10 mg (10 mg IntraVENous Given 9/7/24 1320)   diphenhydrAMINE (BENADRYL) injection 25 mg (25 mg IntraVENous Given 9/7/24 1318)   acetaminophen (TYLENOL) tablet 1,000 mg (1,000 mg Oral Given 9/7/24 1315)   dexAMETHasone (DECADRON) injection 10 mg (10 mg IntraVENous Given 9/7/24 1316)     New Prescriptions    No medications on file         ___________________  HPI: Bre Bautista is a 54 y.o. female with previous history of hypertension, seizure disorder, migraine headaches presenting for evaluation of headache symptoms.  Patient is migraine symptoms starting yesterday, describes a headache that started in the occipital/posterior region of her head and spread to be a generalized headache as well as behind the eyes.  No aura symptoms, no focal deficits of speech gait sensorimotor function.  Progressive/gradual onset.  No history of trauma or fall.  No fever or chills. Patient/family denies any other evaluation for today's acute complaint. Patient/family denies any other aggravating or alleviating factors. Patient/family denies any other symptoms.    ROS:   All review of systems negative except as noted above in the history of the present illness.    Past Medical/ Family/ Social History:

## 2024-09-07 NOTE — ED NOTES
Patient mobility status  with no difficulty. Provider aware     I have reviewed discharge instructions with the patient.  The patient verbalized understanding.    Patient left ED via Discharge Method: ambulatory to Home with Spouse.    Opportunity for questions and clarification provided.     Patient given 0 scripts.           Jones Burleson RN  09/07/24 2213

## 2025-06-26 ENCOUNTER — TRANSCRIBE ORDERS (OUTPATIENT)
Facility: HOSPITAL | Age: 55
End: 2025-06-26

## 2025-06-26 DIAGNOSIS — Z12.31 VISIT FOR SCREENING MAMMOGRAM: Primary | ICD-10-CM

## (undated) DEVICE — 3M™ TEGADERM™ TRANSPARENT FILM DRESSING FRAME STYLE, 1626W, 4 IN X 4-3/4 IN (10 CM X 12 CM), 50/CT 4CT/CASE: Brand: 3M™ TEGADERM™

## (undated) DEVICE — SOLUTION IV 1000ML 0.9% SOD CHL

## (undated) DEVICE — MASTISOL ADHESIVE LIQ 2/3ML

## (undated) DEVICE — REM POLYHESIVE ADULT PATIENT RETURN ELECTRODE: Brand: VALLEYLAB

## (undated) DEVICE — SUTURE VCRL RAPIDE SZ 4-0 L18IN ABSRB UD PS-3 L13MM 3/8 CIR VR494

## (undated) DEVICE — (D)PREP SKN CHLRAPRP APPL 26ML -- CONVERT TO ITEM 371833

## (undated) DEVICE — DRAPE,HAND,STERILE: Brand: MEDLINE

## (undated) DEVICE — DRAIN SURG 15FR L3/16IN DIA4.7MM SIL CHN RND HUBLESS FULL

## (undated) DEVICE — AMD ANTIMICROBIAL GAUZE SPONGES,12 PLY USP TYPE VII, 0.2% POLYHEXAMETHYLENE BIGUANIDE HCI (PHMB): Brand: CURITY

## (undated) DEVICE — (D)STRIP SKN CLSR 0.5X4IN WHT --

## (undated) DEVICE — BNDG ESMARK LATEX 4X12 STRL -- MEDICHOICE

## (undated) DEVICE — PREP SKN CHLRAPRP APL 26ML STR --

## (undated) DEVICE — KIT INFUS PMP 270ML 2M/HR SOAK CATH L2.5IN N NARC ON-Q

## (undated) DEVICE — DISPOSABLE BIPOLAR CODE, 12' (3.66 M): Brand: CONMED

## (undated) DEVICE — STERILE HOOK LOCK LATEX FREE ELASTIC BANDAGE 2INX5YD: Brand: HOOK LOCK™

## (undated) DEVICE — KENDALL SCD EXPRESS SLEEVES, KNEE LENGTH, MEDIUM: Brand: KENDALL SCD

## (undated) DEVICE — SUT PROL 0 30IN CT1 BLU --

## (undated) DEVICE — SUTURE VCRL SZ 0 L36IN ABSRB UD L36MM CT-1 1/2 CIR J946H

## (undated) DEVICE — SUT PROL 2-0 30IN CT2 BLU --

## (undated) DEVICE — SYR LR LCK 1ML GRAD NSAF 30ML --

## (undated) DEVICE — DRAPE, FILM SHEET, 44X65 STERILE: Brand: MEDLINE

## (undated) DEVICE — DRESSING,GAUZE,XEROFORM,CURAD,1"X8",ST: Brand: CURAD

## (undated) DEVICE — SLIM BODY SKIN STAPLER: Brand: APPOSE ULC

## (undated) DEVICE — SHEET, T, LAPAROTOMY, STERILE: Brand: MEDLINE

## (undated) DEVICE — Device

## (undated) DEVICE — SUT ETHLN 3-0 18IN PS1 BLK --

## (undated) DEVICE — STRIP,CLOSURE,WOUND,MEDI-STRIP,1/2X4: Brand: MEDLINE

## (undated) DEVICE — SURGICAL PROCEDURE PACK BASIC ST FRANCIS

## (undated) DEVICE — HAND PACK: Brand: MEDLINE INDUSTRIES, INC.

## (undated) DEVICE — ZIMMER® STERILE DISPOSABLE TOURNIQUET CUFF WITH PLC, DUAL PORT, SINGLE BLADDER, 18 IN. (46 CM)

## (undated) DEVICE — INTENDED FOR TISSUE SEPARATION, AND OTHER PROCEDURES THAT REQUIRE A SHARP SURGICAL BLADE TO PUNCTURE OR CUT.: Brand: BARD-PARKER ® STAINLESS STEEL BLADES

## (undated) DEVICE — BIPOLAR FORCEPS CORD: Brand: VALLEYLAB

## (undated) DEVICE — DERMABOND SKIN ADH 0.7ML -- DERMABOND ADVANCED 12/BX

## (undated) DEVICE — BUTTON SWITCH PENCIL BLADE ELECTRODE, HOLSTER: Brand: EDGE

## (undated) DEVICE — PADDING CAST W2INXL4YD ST COT COHESIVE HND TEARABLE SPEC